# Patient Record
Sex: MALE | Race: ASIAN | Employment: OTHER | URBAN - METROPOLITAN AREA
[De-identification: names, ages, dates, MRNs, and addresses within clinical notes are randomized per-mention and may not be internally consistent; named-entity substitution may affect disease eponyms.]

---

## 2017-02-13 ENCOUNTER — TELEPHONE (OUTPATIENT)
Dept: ENDOCRINOLOGY | Age: 52
End: 2017-02-13

## 2017-02-13 ENCOUNTER — OFFICE VISIT (OUTPATIENT)
Dept: FAMILY MEDICINE CLINIC | Age: 52
End: 2017-02-13

## 2017-02-13 VITALS
TEMPERATURE: 97.5 F | HEIGHT: 64 IN | DIASTOLIC BLOOD PRESSURE: 71 MMHG | HEART RATE: 82 BPM | OXYGEN SATURATION: 98 % | BODY MASS INDEX: 29.19 KG/M2 | SYSTOLIC BLOOD PRESSURE: 112 MMHG | RESPIRATION RATE: 18 BRPM | WEIGHT: 171 LBS

## 2017-02-13 DIAGNOSIS — J45.40 MODERATE PERSISTENT ASTHMA WITHOUT COMPLICATION: ICD-10-CM

## 2017-02-13 DIAGNOSIS — T21.26XA: ICD-10-CM

## 2017-02-13 DIAGNOSIS — E11.65 TYPE 2 DIABETES MELLITUS WITH HYPERGLYCEMIA, WITHOUT LONG-TERM CURRENT USE OF INSULIN (HCC): Primary | ICD-10-CM

## 2017-02-13 DIAGNOSIS — J40 BRONCHITIS: Primary | ICD-10-CM

## 2017-02-13 RX ORDER — DOXYCYCLINE 100 MG/1
100 TABLET ORAL 2 TIMES DAILY
Qty: 20 TAB | Refills: 0 | Status: SHIPPED | OUTPATIENT
Start: 2017-02-13 | End: 2017-02-23

## 2017-02-13 RX ORDER — FLUTICASONE PROPIONATE 50 MCG
SPRAY, SUSPENSION (ML) NASAL
Qty: 1 BOTTLE | Refills: 11 | Status: SHIPPED | OUTPATIENT
Start: 2017-02-13 | End: 2017-05-09 | Stop reason: SDUPTHER

## 2017-02-13 RX ORDER — BACITRACIN ZINC 500 UNIT/G
OINTMENT (GRAM) TOPICAL 2 TIMES DAILY
Qty: 28 G | Refills: 0 | Status: SHIPPED | OUTPATIENT
Start: 2017-02-13 | End: 2017-05-09

## 2017-02-13 RX ORDER — MUPIROCIN 20 MG/G
OINTMENT TOPICAL 2 TIMES DAILY
Qty: 22 G | Refills: 0 | Status: SHIPPED | OUTPATIENT
Start: 2017-02-13 | End: 2017-02-20

## 2017-02-13 RX ORDER — LEVOFLOXACIN 500 MG/1
TABLET, FILM COATED ORAL
COMMUNITY
Start: 2017-02-06 | End: 2017-05-09

## 2017-02-13 RX ORDER — ALBUTEROL SULFATE 90 UG/1
AEROSOL, METERED RESPIRATORY (INHALATION)
Qty: 1 INHALER | Refills: 2 | Status: SHIPPED | OUTPATIENT
Start: 2017-02-13 | End: 2017-05-09 | Stop reason: SDUPTHER

## 2017-02-13 RX ORDER — CODEINE PHOSPHATE AND GUAIFENESIN 10; 100 MG/5ML; MG/5ML
SOLUTION ORAL
COMMUNITY
Start: 2017-02-06 | End: 2017-05-09

## 2017-02-13 NOTE — LETTER
2/28/2017 3:46 PM 
 
Mr. Mague Gonzalez 
[address] [facility] [place] [id number] Dear Mague Gonzalez: 
 
Please find your most recent results below. Resulted Orders METABOLIC PANEL, COMPREHENSIVE Result Value Ref Range Glucose 106 (H) 65 - 99 mg/dL BUN 15 6 - 24 mg/dL Creatinine 1.16 0.76 - 1.27 mg/dL GFR est non-AA 73 >59 mL/min/1.73 GFR est AA 84 >59 mL/min/1.73  
 BUN/Creatinine ratio 13 9 - 20 Sodium 139 134 - 144 mmol/L Potassium 4.8 3.5 - 5.2 mmol/L Chloride 102 96 - 106 mmol/L  
 CO2 21 18 - 29 mmol/L Calcium 9.4 8.7 - 10.2 mg/dL Protein, total 6.6 6.0 - 8.5 g/dL Albumin 4.0 3.5 - 5.5 g/dL GLOBULIN, TOTAL 2.6 1.5 - 4.5 g/dL A-G Ratio 1.5 1.1 - 2.5 Comment: **Effective March 13, 2017 the reference interval** 
  for A/G Ratio will be changing to: Age                Male          Female 0 -  7 days       1.1 - 2.3       1.1 - 2.3 
          8 - 30 days       1.2 - 2.8       1.2 - 2.8 
          1 -  6 months     1.3 - 3.6       1.3 - 3.6 
   7 months -  5 years      1.5 - 2.6       1.5 - 2.6 
             > 5 years      1.2 - 2.2       1.2 - 2.2 Bilirubin, total 1.1 0.0 - 1.2 mg/dL Alk. phosphatase 73 39 - 117 IU/L  
 AST (SGOT) 24 0 - 40 IU/L  
 ALT (SGPT) 17 0 - 44 IU/L Narrative Performed at:  [address]  [id number] : Td Blackwell MD, Phone:  [phone] LIPID PANEL Result Value Ref Range Cholesterol, total 221 (H) 100 - 199 mg/dL Triglyceride 60 0 - 149 mg/dL HDL Cholesterol 38 (L) >39 mg/dL VLDL, calculated 12 5 - 40 mg/dL LDL, calculated 171 (H) 0 - 99 mg/dL Narrative Performed at:  [address]  [id number] : Td Blackwell MD, Phone:  [phone] MICROALBUMIN, UR, RAND Result Value Ref Range Creatinine, urine 183.4 Not Estab. mg/dL Microalbumin, urine 3.9 Not Estab. ug/mL Microalb/Creat ratio (ug/mg creat.) 2.1 0.0 - 30.0 mg/g creat Narrative Performed at:  [address]  [id number] : Sis Carnes MD, Phone:  [phone] HEMOGLOBIN A1C Result Value Ref Range Hemoglobin A1c 6.0 (H) 4.8 - 5.6 % Comment:  
            Pre-diabetes: 5.7 - 6.4 Diabetes: >6.4 Glycemic control for adults with diabetes: <7.0 Estimated average glucose 126 mg/dL Narrative Performed at:  [address]  [id number] : Sis Carnes MD, Phone:  [phone] CVD REPORT Result Value Ref Range INTERPRETATION Note Comment:  
   Supplement report is available. Narrative Performed at:  [facility] [place] [address]  [id number] : Yunier Patterson PhD, Phone:  [phone] DIABETES PATIENT EDUCATION Result Value Ref Range PDF Image Not applicable Narrative Performed at:  [address] [place] [address]  [id number] : Yunier Patterson PhD, Phone:  [phone] RECOMMENDATIONS: 
Cholesterol is high. Diabetes is controlled - he needs cholesterol medication - lipitor 20 mg at night. Please call me if you have any questions: [phone] Sincerely, Phill Coppola MD
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
## 2017-02-13 NOTE — PATIENT INSTRUCTIONS
Bronchitis: Care Instructions  Your Care Instructions    Bronchitis is inflammation of the bronchial tubes, which carry air to the lungs. The tubes swell and produce mucus, or phlegm. The mucus and inflamed bronchial tubes make you cough. You may have trouble breathing. Most cases of bronchitis are caused by viruses like those that cause colds. Antibiotics usually do not help and they may be harmful. Bronchitis usually develops rapidly and lasts about 2 to 3 weeks in otherwise healthy people. Follow-up care is a key part of your treatment and safety. Be sure to make and go to all appointments, and call your doctor if you are having problems. It's also a good idea to know your test results and keep a list of the medicines you take. How can you care for yourself at home? · Take all medicines exactly as prescribed. Call your doctor if you think you are having a problem with your medicine. · Get some extra rest.  · Take an over-the-counter pain medicine, such as acetaminophen (Tylenol), ibuprofen (Advil, Motrin), or naproxen (Aleve) to reduce fever and relieve body aches. Read and follow all instructions on the label. · Do not take two or more pain medicines at the same time unless the doctor told you to. Many pain medicines have acetaminophen, which is Tylenol. Too much acetaminophen (Tylenol) can be harmful. · Take an over-the-counter cough medicine that contains dextromethorphan to help quiet a dry, hacking cough so that you can sleep. Avoid cough medicines that have more than one active ingredient. Read and follow all instructions on the label. · Breathe moist air from a humidifier, hot shower, or sink filled with hot water. The heat and moisture will thin mucus so you can cough it out. · Do not smoke. Smoking can make bronchitis worse. If you need help quitting, talk to your doctor about stop-smoking programs and medicines. These can increase your chances of quitting for good.   When should you call for help? Call 911 anytime you think you may need emergency care. For example, call if:  · You have severe trouble breathing. Call your doctor now or seek immediate medical care if:  · You have new or worse trouble breathing. · You cough up dark brown or bloody mucus (sputum). · You have a new or higher fever. · You have a new rash. Watch closely for changes in your health, and be sure to contact your doctor if:  · You cough more deeply or more often, especially if you notice more mucus or a change in the color of your mucus. · You are not getting better as expected. Where can you learn more? Go to http://maria elena-karol.info/. Enter H333 in the search box to learn more about \"Bronchitis: Care Instructions. \"  Current as of: May 23, 2016  Content Version: 11.1  © 4339-2324 [employer]. Care instructions adapted under license by [facility] (which disclaims liability or warranty for this information). If you have questions about a medical condition or this instruction, always ask your healthcare professional. [place] [address] any warranty or liability for your use of this information. Fuentes: Care Instructions  Your Care Instructions    Fuentes--even minor ones--can be very painful. A minor burn may heal within several days, while a more serious burn may take weeks or even months to heal completely. You may notice that the burned area feels tight and hard while it is healing. It is important to continue to move the area as the burn heals to prevent loss of motion or loss of function in the area. When your skin is damaged by a burn, you have a greater risk of infection. Keep the wound clean and change the bandages regularly to prevent infection and help the burn heal.  Burns can leave permanent scars. Taking good care of the burn as it heals may help prevent bad scars. The doctor has checked you carefully, but problems can develop later.  If you notice any problems or new symptoms, get medical treatment right away. Follow-up care is a key part of your treatment and safety. Be sure to make and go to all appointments, and call your doctor if you are having problems. It's also a good idea to know your test results and keep a list of the medicines you take. How can you care for yourself at home? · If your doctor told you how to care for your burn, follow your doctor's instructions. If you did not get instructions, follow this general advice:  ¨ Wash the burn with clean water 2 times a day. Don't use hydrogen peroxide or alcohol, which can slow healing. ¨ Gently pat the burn dry after you wash it. ¨ You may cover the burn with a thin layer of petroleum jelly, such as Vaseline, and a nonstick bandage. ¨ Apply more petroleum jelly and replace the bandage as needed. · Protect your burn while it is healing. Cover your burn if you are going out in the cold or the sun. ¨ Wear long sleeves if the burn is on your hands or arms. ¨ Wear a hat if the burn is on your face. ¨ Wear socks and shoes if the burn is on your feet. · Do not break blisters open. This increases the chance of infection. If a blister breaks open by itself, blot up the liquid, and leave the skin that covered the blister. This helps protect the new skin. · If your doctor prescribed antibiotics, take them as directed. Do not stop taking them just because you feel better. You need to take the full course of antibiotics. For pain and itching  · Take pain medicines exactly as directed. ¨ If the doctor gave you a prescription medicine for pain, take it as prescribed. ¨ If you are not taking a prescription pain medicine, ask your doctor if you can take an over-the-counter medicine. · If the burn itches, try not to scratch it. Try an over-the-counter antihistamine such as diphenhydramine (Benadryl) or loratadine (Claritin). Read and follow all instructions on the label.   When should you call for help?  Call your doctor now or seek immediate medical care if:  · Your pain gets worse. · You have symptoms of infection, such as:  ¨ Increased pain, swelling, warmth, or redness near the burn. ¨ Red streaks leading from the burn. ¨ Pus draining from the burn. ¨ A fever. Watch closely for changes in your health, and be sure to contact your doctor if:  · You do not get better as expected. Where can you learn more? Go to http://maria elena-karol.info/. Enter Z631 in the search box to learn more about \"Burns: Care Instructions. \"  Current as of: May 27, 2016  Content Version: 11.1  © 4068-3138 [employer]. Care instructions adapted under license by [employer] (which disclaims liability or warranty for this information). If you have questions about a medical condition or this instruction, always ask your healthcare professional. [address] any warranty or liability for your use of this information.

## 2017-02-13 NOTE — PROGRESS NOTES
Chief Complaint   Patient presents with    Cold Symptoms     sinus congestion,, cough and stuffed/runny nose for 2 wks. went to [facility] rx for cough med and antibiotic but no relief. . has hx of asthma. 1. Have you been to the ER, urgent care clinic since your last visit? Hospitalized since your last visit? Yes When: wk ago Where: [facility] Reason for visit: cold sx    2. Have you seen or consulted any other health care providers outside of the [address] [place] [address] since your last visit? Include any pap smears or colon screening.  No

## 2017-02-13 NOTE — TELEPHONE ENCOUNTER
Patient getting labs drawn Friday 2/17/17 at [facility].  Please fax orders to [phone].

## 2017-02-15 ENCOUNTER — TELEPHONE (OUTPATIENT)
Dept: FAMILY MEDICINE CLINIC | Age: 52
End: 2017-02-15

## 2017-02-15 NOTE — TELEPHONE ENCOUNTER
I called and left VM last night  Today, returned call to patient - we discussed need for culture of his penile lesion and also some health maintenance items (including colonoscopy) - I recommended he make an appt for CPE with Dr. Lyndsay Prater (he prefers male provider). [facility] to help to make that appt for this week.

## 2017-02-16 NOTE — PROGRESS NOTES
I reviewed with the resident the medical history and the resident's findings on the physical examination. I discussed with the resident the patient's diagnosis and concur with the plan.   To be seen for HSV culture

## 2017-02-18 LAB
ALBUMIN SERPL-MCNC: 4 G/DL (ref 3.5–5.5)
ALBUMIN/CREAT UR: 2.1 MG/G CREAT (ref 0–30)
ALBUMIN/GLOB SERPL: 1.5 {RATIO} (ref 1.1–2.5)
ALP SERPL-CCNC: 73 IU/L (ref 39–117)
ALT SERPL-CCNC: 17 IU/L (ref 0–44)
AST SERPL-CCNC: 24 IU/L (ref 0–40)
BILIRUB SERPL-MCNC: 1.1 MG/DL (ref 0–1.2)
BUN SERPL-MCNC: 15 MG/DL (ref 6–24)
BUN/CREAT SERPL: 13 (ref 9–20)
CALCIUM SERPL-MCNC: 9.4 MG/DL (ref 8.7–10.2)
CHLORIDE SERPL-SCNC: 102 MMOL/L (ref 96–106)
CHOLEST SERPL-MCNC: 221 MG/DL (ref 100–199)
CO2 SERPL-SCNC: 21 MMOL/L (ref 18–29)
CREAT SERPL-MCNC: 1.16 MG/DL (ref 0.76–1.27)
CREAT UR-MCNC: 183.4 MG/DL
EST. AVERAGE GLUCOSE BLD GHB EST-MCNC: 126 MG/DL
GLOBULIN SER CALC-MCNC: 2.6 G/DL (ref 1.5–4.5)
GLUCOSE SERPL-MCNC: 106 MG/DL (ref 65–99)
HBA1C MFR BLD: 6 % (ref 4.8–5.6)
HDLC SERPL-MCNC: 38 MG/DL
INTERPRETATION, 910389: NORMAL
LDLC SERPL CALC-MCNC: 171 MG/DL (ref 0–99)
Lab: NORMAL
MICROALBUMIN UR-MCNC: 3.9 UG/ML
POTASSIUM SERPL-SCNC: 4.8 MMOL/L (ref 3.5–5.2)
PROT SERPL-MCNC: 6.6 G/DL (ref 6–8.5)
SODIUM SERPL-SCNC: 139 MMOL/L (ref 134–144)
TRIGL SERPL-MCNC: 60 MG/DL (ref 0–149)
VLDLC SERPL CALC-MCNC: 12 MG/DL (ref 5–40)

## 2017-02-23 NOTE — TELEPHONE ENCOUNTER
Cholesterol is high '    Diabetes is controlled - he needs cholesterol medication - lipitor 20 mg at night     Need FU appointment if he wants to discuss more

## 2017-02-24 DIAGNOSIS — E78.5 HYPERLIPIDEMIA, UNSPECIFIED HYPERLIPIDEMIA TYPE: Primary | ICD-10-CM

## 2017-02-27 NOTE — TELEPHONE ENCOUNTER
Pt called Pcp's office to get results of labs ordered by Dr. Elaine Desouza and was advised to contact Dr. Colt Quintero office, which pt states he did about a week ago with no response. Pt advised message noted from 2/24/17, but says he can't get through to office today. Pt requesting call back with lab results please.

## 2017-02-28 RX ORDER — ATORVASTATIN CALCIUM 20 MG/1
20 TABLET, FILM COATED ORAL DAILY
Qty: 30 TAB | Refills: 11 | Status: SHIPPED | OUTPATIENT
Start: 2017-02-28 | End: 2017-08-19 | Stop reason: SDUPTHER

## 2017-02-28 NOTE — TELEPHONE ENCOUNTER
Per Dr. Nita Fletcher, informed pt of the following \"Cholesterol is high. Diabetes is controlled - he needs cholesterol medication - lipitor 20 mg at night. Need FU appointment if he wants to discuss more\" Pt verbalized understanding and would like a copy mailed. No further questions or concerns at this time. Will mail copy.

## 2017-02-28 NOTE — TELEPHONE ENCOUNTER
----- Message from Gloria Edwards MD sent at 2/23/2017  4:49 PM EST -----  Cholesterol is high '    Diabetes is controlled - he needs cholesterol medication - lipitor 20 mg at night     Need FU appointment if he wants to discuss more

## 2017-05-09 ENCOUNTER — OFFICE VISIT (OUTPATIENT)
Dept: FAMILY MEDICINE CLINIC | Age: 52
End: 2017-05-09

## 2017-05-09 VITALS
WEIGHT: 172.6 LBS | DIASTOLIC BLOOD PRESSURE: 83 MMHG | BODY MASS INDEX: 29.47 KG/M2 | RESPIRATION RATE: 20 BRPM | SYSTOLIC BLOOD PRESSURE: 127 MMHG | HEIGHT: 64 IN | HEART RATE: 79 BPM | TEMPERATURE: 97.7 F

## 2017-05-09 DIAGNOSIS — J30.9 ALLERGIC RHINITIS, UNSPECIFIED ALLERGIC RHINITIS TRIGGER, UNSPECIFIED RHINITIS SEASONALITY: ICD-10-CM

## 2017-05-09 DIAGNOSIS — E78.00 HYPERCHOLESTEROLEMIA: ICD-10-CM

## 2017-05-09 DIAGNOSIS — J45.40 MODERATE PERSISTENT ASTHMA WITHOUT COMPLICATION: ICD-10-CM

## 2017-05-09 DIAGNOSIS — E11.65 TYPE 2 DIABETES MELLITUS WITH HYPERGLYCEMIA, WITHOUT LONG-TERM CURRENT USE OF INSULIN (HCC): Primary | ICD-10-CM

## 2017-05-09 DIAGNOSIS — Z78.9 DISCOMFORT: ICD-10-CM

## 2017-05-09 RX ORDER — TRAMADOL HYDROCHLORIDE 50 MG/1
TABLET ORAL AS NEEDED
Refills: 0 | COMMUNITY
Start: 2017-04-14 | End: 2017-10-29

## 2017-05-09 RX ORDER — FLUTICASONE PROPIONATE 50 MCG
SPRAY, SUSPENSION (ML) NASAL
Qty: 1 BOTTLE | Refills: 11 | Status: SHIPPED | OUTPATIENT
Start: 2017-05-09 | End: 2017-11-03 | Stop reason: SDUPTHER

## 2017-05-09 RX ORDER — MELOXICAM 15 MG/1
TABLET ORAL DAILY
Refills: 0 | COMMUNITY
Start: 2017-04-14 | End: 2017-05-09 | Stop reason: ALTCHOICE

## 2017-05-09 RX ORDER — IBUPROFEN 800 MG/1
800 TABLET ORAL
Qty: 30 TAB | Refills: 1 | Status: SHIPPED | OUTPATIENT
Start: 2017-05-09 | End: 2017-11-03

## 2017-05-09 RX ORDER — IBUPROFEN 800 MG/1
800 TABLET ORAL
Qty: 30 TAB | Refills: 1 | Status: CANCELLED | OUTPATIENT
Start: 2017-05-09

## 2017-05-09 RX ORDER — ALBUTEROL SULFATE 90 UG/1
AEROSOL, METERED RESPIRATORY (INHALATION)
Qty: 1 INHALER | Refills: 2 | Status: SHIPPED | OUTPATIENT
Start: 2017-05-09 | End: 2018-03-28 | Stop reason: SDUPTHER

## 2017-05-09 RX ORDER — ALBUTEROL SULFATE 0.83 MG/ML
2.5 SOLUTION RESPIRATORY (INHALATION)
Qty: 24 EACH | Refills: 2 | Status: SHIPPED | OUTPATIENT
Start: 2017-05-09 | End: 2018-10-02 | Stop reason: SDUPTHER

## 2017-05-09 NOTE — MR AVS SNAPSHOT
Visit Information Date & Time Provider Department Dept. Phone Encounter #  
 5/9/2017  3:30 PM Maddie Ko, [address] [id number] Follow-up Instructions Return if symptoms worsen or fail to improve. Upcoming Health Maintenance Date Due  
 FOOT EXAM Q1 10/29/1976 EYE EXAM RETINAL OR DILATED Q1 10/29/1976 Pneumococcal 19-64 Medium Risk (1 of 1 - PPSV23) 10/29/1985 DTaP/Tdap/Td series (1 - Tdap) 10/29/1987 FOBT Q 1 YEAR AGE 50-75 10/29/2016 INFLUENZA AGE 9 TO ADULT 8/1/2017 HEMOGLOBIN A1C Q6M 8/17/2017 MICROALBUMIN Q1 2/17/2018 LIPID PANEL Q1 2/17/2018 Allergies as of 5/9/2017  Review Complete On: 5/9/2017 By: Maddie Ko MD  
  
 Severity Noted Reaction Type Reactions Pcn [Penicillins]  10/11/2011    Other (comments) Father told him as child he was allergic to PCN. He does not know if he has every been on a cephalosporin that he has tolerated Current Immunizations  Reviewed on 9/26/2012 Name Date PPD 9/24/2012 Not reviewed this visit You Were Diagnosed With   
  
 Codes Comments Type 2 diabetes mellitus with hyperglycemia, without long-term current use of insulin ([facility])    -  Primary ICD-10-CM: E11.65 ICD-9-CM: 250.00, 790.29 Hypercholesterolemia     ICD-10-CM: E78.00 ICD-9-CM: 272.0 Moderate persistent asthma without complication     SLJ-69-YL: J45.40 ICD-9-CM: 493.90 Discomfort     ICD-10-CM: Z78.9 ICD-9-CM: V49.89 Allergic rhinitis, unspecified allergic rhinitis trigger, unspecified rhinitis seasonality     ICD-10-CM: J30.9 ICD-9-CM: 477.9 Vitals BP Pulse Temp Resp Height(growth percentile) Weight(growth percentile) 127/83 (BP 1 Location: Left arm, BP Patient Position: Sitting) 79 97.7 °F (36.5 °C) (Oral) 20 5' 4\" (1.626 m) 172 lb 9.6 oz (78.3 kg) BMI Smoking Status 29.63 kg/m2 Never Smoker Vitals History BMI and BSA Data Body Mass Index Body Surface Area  
 29.63 kg/m 2 1.88 m 2 Preferred Pharmacy Pharmacy Name Phone [facility] [address] [place] [facility] [phone] Your Updated Medication List  
  
   
This list is accurate as of: 5/9/17  4:51 PM.  Always use your most recent med list.  
  
  
  
  
 * albuterol 90 mcg/actuation inhaler Commonly known as:  VENTOLIN HFA INHALE 1 TO 2 PUFFS BY MOUTH EVERY 4 HOURS AS NEEDED FOR WHEEZING  
  
 * albuterol 2.5 mg /3 mL (0.083 %) nebulizer solution Commonly known as:  PROVENTIL VENTOLIN  
3 mL by Nebulization route every four (4) hours as needed for Wheezing. atorvastatin 20 mg tablet Commonly known as:  LIPITOR Take 1 Tab by mouth daily. beclomethasone 80 mcg/actuation [employer] Commonly known as:  QVAR Take 1 Puff by inhalation two (2) times a day. fluticasone 50 mcg/actuation nasal spray Commonly known as:  FLONASE  
USE 2 SPRAYS IN BOTH NOSTRILS EVERY DAY  
  
 ibuprofen 800 mg tablet Commonly known as:  MOTRIN Take 1 Tab by mouth every eight (8) hours as needed for Pain.  
  
 metFORMIN  mg tablet Commonly known as:  GLUCOPHAGE XR Take 1 Tab by mouth two (2) times a day. For DM2  
  
 traMADol 50 mg tablet Commonly known as:  ULTRAM  
as needed. * Notice: This list has 2 medication(s) that are the same as other medications prescribed for you. Read the directions carefully, and ask your doctor or other care provider to review them with you. Prescriptions Sent to Pharmacy Refills  
 fluticasone (FLONASE) 50 mcg/actuation nasal spray 11 Sig: USE 2 SPRAYS IN BOTH NOSTRILS EVERY DAY  Class: Normal  
 Pharmacy: [address] [place] [address] Ph #: [phone]  
 albuterol (VENTOLIN HFA) 90 mcg/actuation inhaler 2  
 Sig: INHALE 1 TO 2 PUFFS BY MOUTH EVERY 4 HOURS AS NEEDED FOR WHEEZING Class: Normal  
 Pharmacy: [facility] [address] Ph #: [phone]  
 albuterol (PROVENTIL VENTOLIN) 2.5 mg /3 mL (0.083 %) nebulizer solution 2 Sig: 3 mL by Nebulization route every four (4) hours as needed for Wheezing. Class: Normal  
 Pharmacy: [address] Ph #: [phone] Route: Nebulization  
 beclomethasone (QVAR) 80 mcg/actuation aero 11 Sig: Take 1 Puff by inhalation two (2) times a day. Class: Normal  
 Pharmacy: [address] Ph #: [phone] Route: Inhalation  
 ibuprofen (MOTRIN) 800 mg tablet 1 Sig: Take 1 Tab by mouth every eight (8) hours as needed for Pain. Class: Normal  
 Pharmacy: [address] Ph #: [phone] Route: Oral  
  
Follow-up Instructions Return if symptoms worsen or fail to improve. To-Do List   
 07/10/2017 Lab:  HEPATIC FUNCTION PANEL   
  
 07/10/2017 Lab:  LIPID PANEL Introducing [address]! Dirk Steinberg introduces [employer] patient portal. Now you can access parts of your medical record, email your doctor's office, and request medication refills online. 1. In your internet browser, go to [email or web address]"t 2. Click on the First Time User? Click Here link in the Sign In box. You will see the New Member Sign Up page. 3. Enter your [employer] Access Code exactly as it appears below. You will not need to use this code after youve completed the sign-up process. If you do not sign up before the expiration date, you must request a new code. · [employer] Access Code: [email or web address] Expires: 5/14/2017  5:52 PM 
 
4. Enter the last four digits of your Social Security Number (xxxx) and Date of Birth (mm/dd/yyyy) as indicated and click Submit. You will be taken to the next sign-up page. 5. Create a [employer] ID. This will be your [employer] login ID and cannot be changed, so think of one that is secure and easy to remember. 6. Create a [employer] password. You can change your password at any time. 7. Enter your Password Reset Question and Answer. This can be used at a later time if you forget your password. 8. Enter your e-mail address. You will receive e-mail notification when new information is available in [address] 9. Click Sign Up. You can now view and download portions of your medical record. 10. Click the Download Summary menu link to download a portable copy of your medical information. If you have questions, please visit the Frequently Asked Questions section of the [employer] website. Remember, [employer] is NOT to be used for urgent needs. For medical emergencies, dial 911. Now available from your iPhone and Android! Please provide this summary of care documentation to your next provider. Your primary care clinician is listed as Kimmie Hart. If you have any questions after today's visit, please call [phone].

## 2017-05-09 NOTE — PROGRESS NOTES
Chief Complaint   Patient presents with    Diabetes     follow up    Cholesterol Problem     elevated lipids follow up

## 2017-05-09 NOTE — PROGRESS NOTES
HISTORY OF PRESENT ILLNESS  Sami Tsang is a 49 y.o. male. Diabetes   The history is provided by the patient (he sees endocrinology for this). This is a chronic problem. The current episode started more than 1 week ago. The problem occurs constantly. The problem has been gradually improving. Pertinent negatives include no chest pain, no abdominal pain, no headaches and no shortness of breath. Nothing aggravates the symptoms. The symptoms are relieved by medications. Treatments tried: metformin. The treatment provided significant relief. Cholesterol Problem   The history is provided by the patient (he just started Lipitor ). Pertinent negatives include no chest pain, no abdominal pain, no headaches and no shortness of breath. Review of Systems   Constitutional: Positive for malaise/fatigue and weight loss. Eyes: Negative for blurred vision. Respiratory: Negative for shortness of breath. Cardiovascular: Negative for chest pain and leg swelling. Gastrointestinal: Negative for abdominal pain. Genitourinary: Negative for frequency. Neurological: Negative for dizziness, sensory change, speech change, focal weakness and headaches. Endo/Heme/Allergies: Negative for polydipsia. Lab Results   Component Value Date/Time    Hemoglobin A1c 6.0 02/17/2017 08:42 AM    Hemoglobin A1c, External 7.4 09/23/2016         Visit Vitals    /83 (BP 1 Location: Left arm, BP Patient Position: Sitting)    Pulse 79    Temp 97.7 °F (36.5 °C) (Oral)    Resp 20    Ht 5' 4\" (1.626 m)    Wt 172 lb 9.6 oz (78.3 kg)    BMI 29.63 kg/m2     Physical Exam   Constitutional: He is oriented to person, place, and time. He appears well-developed and well-nourished. No distress. Cardiovascular: Normal rate, regular rhythm and normal heart sounds. Exam reveals no gallop and no friction rub. No murmur heard. Pulmonary/Chest: Effort normal and breath sounds normal. No respiratory distress. He has no wheezes.  He has no rales. Musculoskeletal: He exhibits no edema. Neurological: He is alert and oriented to person, place, and time. Skin: Skin is warm and dry. He is not diaphoretic. Nursing note and vitals reviewed. ASSESSMENT and PLAN    ICD-10-CM ICD-9-CM    1. Type 2 diabetes mellitus with hyperglycemia, without long-term current use of insulin (HCC) E11.65 250.00      790.29    2. Hypercholesterolemia E78.00 272.0 HEPATIC FUNCTION PANEL      LIPID PANEL   3. Moderate persistent asthma without complication F20.80 793.93 albuterol (VENTOLIN HFA) 90 mcg/actuation inhaler      albuterol (PROVENTIL VENTOLIN) 2.5 mg /3 mL (0.083 %) nebulizer solution      beclomethasone (QVAR) 80 mcg/actuation aero   4. Discomfort Z78.9 V49.89 ibuprofen (MOTRIN) 800 mg tablet   5. Allergic rhinitis, unspecified allergic rhinitis trigger, unspecified rhinitis seasonality J30.9 477.9 fluticasone (FLONASE) 50 mcg/actuation nasal spray        Will be seeing Endocrinology for his diabetes next week  Just started Lipitor  Labs per orders. Refills per orders    Follow-up Disposition:  Return if symptoms worsen or fail to improve. Reviewed plan of care. Patient has provided input and agrees with goals.

## 2017-05-23 ENCOUNTER — TELEPHONE (OUTPATIENT)
Dept: FAMILY MEDICINE CLINIC | Age: 52
End: 2017-05-23

## 2017-05-23 RX ORDER — BENZONATATE 200 MG/1
200 CAPSULE ORAL
Qty: 30 CAP | Refills: 0 | Status: SHIPPED | OUTPATIENT
Start: 2017-05-23 | End: 2017-10-29

## 2017-05-23 NOTE — TELEPHONE ENCOUNTER
Pt called back requesting refill on cough syrup prescribed last for cough stating he forgot to ask for it at his last appointment with . Pt states he's using his nebulizer but needs something for cough and wants call back to advise.

## 2017-05-23 NOTE — TELEPHONE ENCOUNTER
Called and spoke with pt, and he has been advised a RX has been sent to his pharmacy. Pt advised if he does not get better then he needs to call and schedule an appointment. Pt states understanding.

## 2017-05-26 ENCOUNTER — HOSPITAL ENCOUNTER (OUTPATIENT)
Dept: MRI IMAGING | Age: 52
Discharge: HOME | End: 2017-05-26
Attending: PHYSICAL MEDICINE & REHABILITATION
Payer: COMMERCIAL

## 2017-05-26 DIAGNOSIS — M54.42 LUMBAGO WITH SCIATICA, LEFT SIDE: ICD-10-CM

## 2017-05-26 DIAGNOSIS — M54.41 LUMBAGO WITH SCIATICA, RIGHT SIDE: ICD-10-CM

## 2017-05-26 DIAGNOSIS — M47.816 LUMBAR SPONDYLOSIS: ICD-10-CM

## 2017-05-26 PROCEDURE — 72148 MRI LUMBAR SPINE W/O DYE: CPT

## 2017-07-10 DIAGNOSIS — E78.00 HYPERCHOLESTEROLEMIA: ICD-10-CM

## 2017-08-15 LAB
ALBUMIN SERPL-MCNC: 4 G/DL (ref 3.5–5.5)
ALP SERPL-CCNC: 70 IU/L (ref 39–117)
ALT SERPL-CCNC: 21 IU/L (ref 0–44)
AST SERPL-CCNC: 18 IU/L (ref 0–40)
BILIRUB DIRECT SERPL-MCNC: 0.27 MG/DL (ref 0–0.4)
BILIRUB SERPL-MCNC: 1.2 MG/DL (ref 0–1.2)
CHOLEST SERPL-MCNC: 189 MG/DL (ref 100–199)
HDLC SERPL-MCNC: 41 MG/DL
INTERPRETATION, 910389: NORMAL
LDLC SERPL CALC-MCNC: 128 MG/DL (ref 0–99)
PROT SERPL-MCNC: 7.2 G/DL (ref 6–8.5)
TRIGL SERPL-MCNC: 102 MG/DL (ref 0–149)
VLDLC SERPL CALC-MCNC: 20 MG/DL (ref 5–40)

## 2017-08-19 DIAGNOSIS — E78.5 HYPERLIPIDEMIA, UNSPECIFIED HYPERLIPIDEMIA TYPE: ICD-10-CM

## 2017-08-19 RX ORDER — ATORVASTATIN CALCIUM 40 MG/1
40 TABLET, FILM COATED ORAL DAILY
Qty: 30 TAB | Refills: 3 | Status: SHIPPED | OUTPATIENT
Start: 2017-08-19 | End: 2017-08-22 | Stop reason: SDUPTHER

## 2017-08-21 ENCOUNTER — OFFICE VISIT (OUTPATIENT)
Dept: ENDOCRINOLOGY | Age: 52
End: 2017-08-21

## 2017-08-21 VITALS
OXYGEN SATURATION: 98 % | RESPIRATION RATE: 18 BRPM | HEART RATE: 87 BPM | TEMPERATURE: 96.1 F | SYSTOLIC BLOOD PRESSURE: 133 MMHG | DIASTOLIC BLOOD PRESSURE: 95 MMHG | WEIGHT: 182.2 LBS | HEIGHT: 64 IN | BODY MASS INDEX: 31.1 KG/M2

## 2017-08-21 DIAGNOSIS — E78.00 HYPERCHOLESTEROLEMIA: ICD-10-CM

## 2017-08-21 DIAGNOSIS — E11.65 TYPE 2 DIABETES MELLITUS WITH HYPERGLYCEMIA, WITHOUT LONG-TERM CURRENT USE OF INSULIN (HCC): Primary | ICD-10-CM

## 2017-08-21 LAB — HBA1C MFR BLD HPLC: 5.9 %

## 2017-08-21 RX ORDER — MELOXICAM 15 MG/1
TABLET ORAL
COMMUNITY
Start: 2017-04-14 | End: 2017-10-29

## 2017-08-21 RX ORDER — GABAPENTIN 300 MG/1
CAPSULE ORAL
Refills: 1 | COMMUNITY
Start: 2017-06-15 | End: 2017-10-29

## 2017-08-21 RX ORDER — MINERAL OIL, LIGHT/MINERAL OIL 1%-4.5%
DROPS OPHTHALMIC (EYE)
Refills: 5 | COMMUNITY
Start: 2017-07-11 | End: 2018-10-30

## 2017-08-21 NOTE — MR AVS SNAPSHOT
Visit Information Date & Time Provider Department Dept. Phone Encounter #  
 8/21/2017 11:30 AM Rio Pat MD [facility] [phone] [id number] Follow-up Instructions Return in about 6 months (around 2/21/2018). Upcoming Health Maintenance Date Due  
 FOOT EXAM Q1 10/29/1976 EYE EXAM RETINAL OR DILATED Q1 10/29/1976 Pneumococcal 19-64 Medium Risk (1 of 1 - PPSV23) 10/29/1985 DTaP/Tdap/Td series (1 - Tdap) 10/29/1987 FOBT Q 1 YEAR AGE 50-75 10/29/2016 INFLUENZA AGE 9 TO ADULT 8/1/2017 HEMOGLOBIN A1C Q6M 8/17/2017 MICROALBUMIN Q1 2/17/2018 LIPID PANEL Q1 8/14/2018 Allergies as of 8/21/2017  Review Complete On: 8/21/2017 By: Rio Pat MD  
  
 Severity Noted Reaction Type Reactions Pcn [Penicillins]  10/11/2011    Other (comments) Father told him as child he was allergic to PCN. He does not know if he has every been on a cephalosporin that he has tolerated Current Immunizations  Reviewed on 9/26/2012 Name Date PPD 9/24/2012 Not reviewed this visit Vitals BP Pulse Temp Resp Height(growth percentile) Weight(growth percentile) (!) 133/95 (BP 1 Location: Right arm, BP Patient Position: Sitting) 87 96.1 °F (35.6 °C) (Oral) 18 5' 4\" (1.626 m) 182 lb 3.2 oz (82.6 kg) SpO2 BMI Smoking Status 98% 31.27 kg/m2 Never Smoker BMI and BSA Data Body Mass Index Body Surface Area  
 31.27 kg/m 2 1.93 m 2 Preferred Pharmacy Pharmacy Name Phone [facility] [place], [address] [place] AT [facility] [phone] Your Updated Medication List  
  
   
This list is accurate as of: 8/21/17 12:28 PM.  Always use your most recent med list.  
  
  
  
  
 * albuterol 90 mcg/actuation inhaler Commonly known as:  VENTOLIN HFA INHALE 1 TO 2 PUFFS BY MOUTH EVERY 4 HOURS AS NEEDED FOR WHEEZING  
  
 * albuterol 2.5 mg /3 mL (0.083 %) nebulizer solution Commonly known as:  PROVENTIL VENTOLIN  
3 mL by Nebulization route every four (4) hours as needed for Wheezing. atorvastatin 40 mg tablet Commonly known as:  LIPITOR Take 1 Tab by mouth daily. beclomethasone 80 mcg/actuation [employer] Commonly known as:  QVAR Take 1 Puff by inhalation two (2) times a day. benzonatate 200 mg capsule Commonly known as:  TESSALON Take 1 Cap by mouth three (3) times daily as needed for Cough. fluticasone 50 mcg/actuation nasal spray Commonly known as:  FLONASE  
USE 2 SPRAYS IN BOTH NOSTRILS EVERY DAY  
  
 gabapentin 300 mg capsule Commonly known as:  NEURONTIN  
  
 ibuprofen 800 mg tablet Commonly known as:  MOTRIN Take 1 Tab by mouth every eight (8) hours as needed for Pain.  
  
 meloxicam 15 mg tablet Commonly known as:  MOBIC TK 1 T PO QD WF  
  
 metFORMIN  mg tablet Commonly known as:  GLUCOPHAGE XR Take 1 Tab by mouth two (2) times a day. For DM2 SOOTHE XP 1-4.5 % Drop ophthalmic solution Generic drug:  light mineral oil-mineral oil INT ONE GTT IN OU QID PRN  
  
 traMADol 50 mg tablet Commonly known as:  ULTRAM  
as needed. * Notice: This list has 2 medication(s) that are the same as other medications prescribed for you. Read the directions carefully, and ask your doctor or other care provider to review them with you. Follow-up Instructions Return in about 6 months (around 2/21/2018). Introducing [facility]! Waldemar Spicer introduces [employer] patient portal. Now you can access parts of your medical record, email your doctor's office, and request medication refills online. 1. In your internet browser, go to [email or web address] 2. Click on the First Time User? Click Here link in the Sign In box. You will see the New Member Sign Up page. 3. Enter your [employer] Access Code exactly as it appears below. You will not need to use this code after youve completed the sign-up process. If you do not sign up before the expiration date, you must request a new code. · [employer] Access Code: [email or web address] Expires: 11/19/2017 12:28 PM 
 
4. Enter the last four digits of your Social Security Number (xxxx) and Date of Birth (mm/dd/yyyy) as indicated and click Submit. You will be taken to the next sign-up page. 5. Create a [employer] ID. This will be your [employer] login ID and cannot be changed, so think of one that is secure and easy to remember. 6. Create a [employer] password. You can change your password at any time. 7. Enter your Password Reset Question and Answer. This can be used at a later time if you forget your password. 8. Enter your e-mail address. You will receive e-mail notification when new information is available in [address]. 9. Click Sign Up. You can now view and download portions of your medical record. 10. Click the Download Summary menu link to download a portable copy of your medical information. If you have questions, please visit the Frequently Asked Questions section of the [employer] website. Remember, [employer] is NOT to be used for urgent needs. For medical emergencies, dial 911. Now available from your iPhone and Android! Please provide this summary of care documentation to your next provider. Your primary care clinician is listed as Bryce Guerin. If you have any questions after today's visit, please call [phone].

## 2017-08-21 NOTE — PROGRESS NOTES
Wt Readings from Last 3 Encounters:   08/21/17 182 lb 3.2 oz (82.6 kg)   05/09/17 172 lb 9.6 oz (78.3 kg)   02/13/17 171 lb (77.6 kg)     Temp Readings from Last 3 Encounters:   08/21/17 96.1 °F (35.6 °C) (Oral)   05/09/17 97.7 °F (36.5 °C) (Oral)   02/13/17 97.5 °F (36.4 °C) (Oral)     BP Readings from Last 3 Encounters:   08/21/17 (!) 133/95   05/09/17 127/83   02/13/17 112/71     Pulse Readings from Last 3 Encounters:   08/21/17 87   05/09/17 79   02/13/17 82     Lab Results   Component Value Date/Time    Hemoglobin A1c 6.0 02/17/2017 08:42 AM    Hemoglobin A1c, External 7.4 09/23/2016   Foot exam: no  Eye exam: yes July 2017  1. Have you been to the ER, urgent care clinic since your last visit? Hospitalized since your last visit? No    2. Have you seen or consulted any other health care providers outside of the [facility] since your last visit? Include any pap smears or colon screening.  No

## 2017-08-22 DIAGNOSIS — E78.5 HYPERLIPIDEMIA, UNSPECIFIED HYPERLIPIDEMIA TYPE: ICD-10-CM

## 2017-08-22 DIAGNOSIS — E11.9 TYPE 2 DIABETES MELLITUS WITHOUT COMPLICATION, WITHOUT LONG-TERM CURRENT USE OF INSULIN (HCC): ICD-10-CM

## 2017-08-22 RX ORDER — METFORMIN HYDROCHLORIDE 500 MG/1
500 TABLET, EXTENDED RELEASE ORAL DAILY
Qty: 30 TAB | Refills: 11 | Status: SHIPPED | OUTPATIENT
Start: 2017-08-22 | End: 2018-02-06 | Stop reason: SDUPTHER

## 2017-08-22 RX ORDER — ATORVASTATIN CALCIUM 40 MG/1
40 TABLET, FILM COATED ORAL DAILY
Qty: 30 TAB | Refills: 11 | Status: SHIPPED | OUTPATIENT
Start: 2017-08-22 | End: 2018-03-28

## 2017-10-29 ENCOUNTER — HOSPITAL ENCOUNTER (EMERGENCY)
Age: 52
Discharge: HOME | End: 2017-10-29
Attending: EMERGENCY MEDICINE
Payer: COMMERCIAL

## 2017-10-29 ENCOUNTER — APPOINTMENT (OUTPATIENT)
Dept: GENERAL RADIOLOGY | Age: 52
End: 2017-10-29
Attending: PHYSICIAN ASSISTANT
Payer: COMMERCIAL

## 2017-10-29 VITALS
WEIGHT: 180 LBS | OXYGEN SATURATION: 99 % | BODY MASS INDEX: 30.73 KG/M2 | HEIGHT: 64 IN | RESPIRATION RATE: 18 BRPM | TEMPERATURE: 97.8 F | HEART RATE: 92 BPM | SYSTOLIC BLOOD PRESSURE: 127 MMHG | DIASTOLIC BLOOD PRESSURE: 96 MMHG

## 2017-10-29 DIAGNOSIS — J45.31 MILD PERSISTENT ASTHMA WITH ACUTE EXACERBATION: Primary | ICD-10-CM

## 2017-10-29 LAB
ALBUMIN SERPL-MCNC: 3.3 G/DL (ref 3.5–5)
ALBUMIN/GLOB SERPL: 0.9 {RATIO} (ref 1.1–2.2)
ALP SERPL-CCNC: 79 U/L (ref 45–117)
ALT SERPL-CCNC: 27 U/L (ref 12–78)
ANION GAP SERPL CALC-SCNC: 9 MMOL/L (ref 5–15)
AST SERPL-CCNC: 20 U/L (ref 15–37)
BASOPHILS # BLD: 0 K/UL (ref 0–0.1)
BASOPHILS NFR BLD: 0 % (ref 0–1)
BILIRUB SERPL-MCNC: 0.8 MG/DL (ref 0.2–1)
BUN SERPL-MCNC: 14 MG/DL (ref 6–20)
BUN/CREAT SERPL: 10 (ref 12–20)
CALCIUM SERPL-MCNC: 8.5 MG/DL (ref 8.5–10.1)
CHLORIDE SERPL-SCNC: 106 MMOL/L (ref 97–108)
CO2 SERPL-SCNC: 23 MMOL/L (ref 21–32)
CREAT SERPL-MCNC: 1.38 MG/DL (ref 0.7–1.3)
EOSINOPHIL # BLD: 0.6 K/UL (ref 0–0.4)
EOSINOPHIL NFR BLD: 10 % (ref 0–7)
ERYTHROCYTE [DISTWIDTH] IN BLOOD BY AUTOMATED COUNT: 14.1 % (ref 11.5–14.5)
GLOBULIN SER CALC-MCNC: 3.6 G/DL (ref 2–4)
GLUCOSE SERPL-MCNC: 159 MG/DL (ref 65–100)
HCT VFR BLD AUTO: 47.3 % (ref 36.6–50.3)
HGB BLD-MCNC: 16.4 G/DL (ref 12.1–17)
LYMPHOCYTES # BLD: 2.7 K/UL (ref 0.8–3.5)
LYMPHOCYTES NFR BLD: 45 % (ref 12–49)
MCH RBC QN AUTO: 27 PG (ref 26–34)
MCHC RBC AUTO-ENTMCNC: 34.7 G/DL (ref 30–36.5)
MCV RBC AUTO: 77.8 FL (ref 80–99)
MONOCYTES # BLD: 0.3 K/UL (ref 0–1)
MONOCYTES NFR BLD: 5 % (ref 5–13)
NEUTS SEG # BLD: 2.5 K/UL (ref 1.8–8)
NEUTS SEG NFR BLD: 40 % (ref 32–75)
PLATELET # BLD AUTO: 193 K/UL (ref 150–400)
POTASSIUM SERPL-SCNC: 3.4 MMOL/L (ref 3.5–5.1)
PROT SERPL-MCNC: 6.9 G/DL (ref 6.4–8.2)
RBC # BLD AUTO: 6.08 M/UL (ref 4.1–5.7)
SODIUM SERPL-SCNC: 138 MMOL/L (ref 136–145)
WBC # BLD AUTO: 6.1 K/UL (ref 4.1–11.1)

## 2017-10-29 PROCEDURE — 94640 AIRWAY INHALATION TREATMENT: CPT

## 2017-10-29 PROCEDURE — 99284 EMERGENCY DEPT VISIT MOD MDM: CPT

## 2017-10-29 PROCEDURE — 80053 COMPREHEN METABOLIC PANEL: CPT | Performed by: PHYSICIAN ASSISTANT

## 2017-10-29 PROCEDURE — 74011250636 HC RX REV CODE- 250/636: Performed by: PHYSICIAN ASSISTANT

## 2017-10-29 PROCEDURE — 77030013140 HC MSK NEB VYRM -A

## 2017-10-29 PROCEDURE — 74011000250 HC RX REV CODE- 250: Performed by: PHYSICIAN ASSISTANT

## 2017-10-29 PROCEDURE — 96375 TX/PRO/DX INJ NEW DRUG ADDON: CPT

## 2017-10-29 PROCEDURE — 74011636637 HC RX REV CODE- 636/637: Performed by: PHYSICIAN ASSISTANT

## 2017-10-29 PROCEDURE — 85025 COMPLETE CBC W/AUTO DIFF WBC: CPT | Performed by: PHYSICIAN ASSISTANT

## 2017-10-29 PROCEDURE — 96365 THER/PROPH/DIAG IV INF INIT: CPT

## 2017-10-29 PROCEDURE — 71020 XR CHEST PA LAT: CPT

## 2017-10-29 PROCEDURE — 36415 COLL VENOUS BLD VENIPUNCTURE: CPT | Performed by: PHYSICIAN ASSISTANT

## 2017-10-29 RX ORDER — PROMETHAZINE HYDROCHLORIDE AND CODEINE PHOSPHATE 6.25; 1 MG/5ML; MG/5ML
5 SOLUTION ORAL
Qty: 120 ML | Refills: 0 | Status: SHIPPED | OUTPATIENT
Start: 2017-10-29 | End: 2017-11-14

## 2017-10-29 RX ORDER — IPRATROPIUM BROMIDE AND ALBUTEROL SULFATE 2.5; .5 MG/3ML; MG/3ML
3 SOLUTION RESPIRATORY (INHALATION)
Status: COMPLETED | OUTPATIENT
Start: 2017-10-29 | End: 2017-10-29

## 2017-10-29 RX ORDER — PREDNISONE 10 MG/1
TABLET ORAL
Qty: 48 TAB | Refills: 0 | Status: SHIPPED | OUTPATIENT
Start: 2017-10-29 | End: 2017-11-14 | Stop reason: ALTCHOICE

## 2017-10-29 RX ORDER — PREDNISONE 20 MG/1
60 TABLET ORAL
Status: COMPLETED | OUTPATIENT
Start: 2017-10-29 | End: 2017-10-29

## 2017-10-29 RX ORDER — SULFAMETHOXAZOLE AND TRIMETHOPRIM 800; 160 MG/1; MG/1
1 TABLET ORAL 2 TIMES DAILY
COMMUNITY
End: 2017-11-03

## 2017-10-29 RX ORDER — PROMETHAZINE HYDROCHLORIDE AND CODEINE PHOSPHATE 6.25; 1 MG/5ML; MG/5ML
SOLUTION ORAL
COMMUNITY
End: 2017-10-29

## 2017-10-29 RX ORDER — DOXYCYCLINE 100 MG/1
100 CAPSULE ORAL 2 TIMES DAILY
COMMUNITY
End: 2017-11-03 | Stop reason: ALTCHOICE

## 2017-10-29 RX ORDER — MAGNESIUM SULFATE HEPTAHYDRATE 40 MG/ML
2 INJECTION, SOLUTION INTRAVENOUS
Status: COMPLETED | OUTPATIENT
Start: 2017-10-29 | End: 2017-10-29

## 2017-10-29 RX ADMIN — MAGNESIUM SULFATE HEPTAHYDRATE 2 G: 40 INJECTION, SOLUTION INTRAVENOUS at 18:30

## 2017-10-29 RX ADMIN — METHYLPREDNISOLONE SODIUM SUCCINATE 125 MG: 125 INJECTION, POWDER, FOR SOLUTION INTRAMUSCULAR; INTRAVENOUS at 18:30

## 2017-10-29 RX ADMIN — IPRATROPIUM BROMIDE AND ALBUTEROL SULFATE 3 ML: .5; 3 SOLUTION RESPIRATORY (INHALATION) at 17:33

## 2017-10-29 RX ADMIN — IPRATROPIUM BROMIDE AND ALBUTEROL SULFATE 3 ML: .5; 3 SOLUTION RESPIRATORY (INHALATION) at 18:30

## 2017-10-29 RX ADMIN — PREDNISONE 60 MG: 20 TABLET ORAL at 17:32

## 2017-10-29 NOTE — DISCHARGE INSTRUCTIONS
Asthma Attack: Care Instructions  Your Care Instructions    During an asthma attack, the airways swell and narrow. This makes it hard to breathe. Severe asthma attacks can be life-threatening, but you can help prevent them by keeping your asthma under control and treating symptoms before they get bad. Symptoms include being short of breath, having chest tightness, coughing, and wheezing. Noting and treating these symptoms can also help you avoid future trips to the emergency room. The doctor has checked you carefully, but problems can develop later. If you notice any problems or new symptoms, get medical treatment right away. Follow-up care is a key part of your treatment and safety. Be sure to make and go to all appointments, and call your doctor if you are having problems. It's also a good idea to know your test results and keep a list of the medicines you take. How can you care for yourself at home? · Follow your asthma action plan to prevent and treat attacks. If you don't have an asthma action plan, work with your doctor to create one. · Take your asthma medicines exactly as prescribed. Talk to your doctor right away if you have any questions about how to take them. ¨ Use your quick-relief medicine when you have symptoms of an attack. Quick-relief medicine is usually an albuterol inhaler. Some people need to use quick-relief medicine before they exercise. ¨ Take your controller medicine every day, not just when you have symptoms. Controller medicine is usually an inhaled corticosteroid. The goal is to prevent problems before they occur. Don't use your controller medicine to treat an attack that has already started. It doesn't work fast enough to help. ¨ If your doctor prescribed corticosteroid pills to use during an attack, take them exactly as prescribed. It may take hours for the pills to work, but they may make the episode shorter and help you breathe better.   ¨ Keep your quick-relief medicine with you at all times. · Talk to your doctor before using other medicines. Some medicines, such as aspirin, can cause asthma attacks in some people. · If you have a peak flow meter, use it to check how well you are breathing. This can help you predict when an asthma attack is going to occur. Then you can take medicine to prevent the asthma attack or make it less severe. · Do not smoke or allow others to smoke around you. Avoid smoky places. Smoking makes asthma worse. If you need help quitting, talk to your doctor about stop-smoking programs and medicines. These can increase your chances of quitting for good. · Learn what triggers an asthma attack for you, and avoid the triggers when you can. Common triggers include colds, smoke, air pollution, dust, pollen, mold, pets, cockroaches, stress, and cold air. · Avoid colds and the flu. Get a pneumococcal vaccine shot. If you have had one before, ask your doctor if you need a second dose. Get a flu vaccine every fall. If you must be around people with colds or the flu, wash your hands often. When should you call for help? Call 911 anytime you think you may need emergency care. For example, call if:  ? · You have severe trouble breathing. ?Call your doctor now or seek immediate medical care if:  ? · Your symptoms do not get better after you have followed your asthma action plan. ? · You have new or worse trouble breathing. ? · Your coughing and wheezing get worse. ? · You cough up dark brown or bloody mucus (sputum). ? · You have a new or higher fever. ? Watch closely for changes in your health, and be sure to contact your doctor if:  ? · You need to use quick-relief medicine on more than 2 days a week (unless it is just for exercise). ? · You cough more deeply or more often, especially if you notice more mucus or a change in the color of your mucus. ? · You are not getting better as expected. Where can you learn more?   Go to http://maria elena-karol.info/. Enter G427 in the search box to learn more about \"Asthma Attack: Care Instructions. \"  Current as of: May 12, 2017  Content Version: 11.4  © 5636-5065 [employer]. Care instructions adapted under license by [employer] (which disclaims liability or warranty for this information). If you have questions about a medical condition or this instruction, always ask your healthcare professional. [address] any warranty or liability for your use of this information. We hope that we have addressed all of your medical concerns. The examination and treatment you received in the Emergency Department were for an emergent problem and were not intended as complete care. It is important that you follow up with your healthcare provider(s) for ongoing care. If your symptoms worsen or do not improve as expected, and you are unable to reach your usual health care provider(s), you should return to the Emergency Department. Today's healthcare is undergoing tremendous change, and patient satisfaction surveys are one of the many tools to assess the quality of medical care. You may receive a survey from the [employer] regarding your experience in the Emergency Department. I hope that your experience has been completely positive, particularly the medical care that I provided. As such, please participate in the survey; anything less than excellent does not meet my expectations or intentions. [facility] and [facility] participate in nationally recognized quality of care measures. If your blood pressure is greater than 120/80, as reported below, we urge that you seek medical care to address the potential of high blood pressure, commonly known as hypertension.    Hypertension can be hereditary or can be caused by certain medical conditions, pain, stress, or \"white coat syndrome. \"       Please make an appointment with your health care provider(s) for follow up of your Emergency Department visit. VITALS:   Patient Vitals for the past 8 hrs:   Temp Pulse Resp BP SpO2   10/29/17 1854 - - - - 99 %   10/29/17 1845 - - - - 99 %   10/29/17 1830 - - - - 98 %   10/29/17 1815 - - - - 98 %   10/29/17 1800 - - - (!) 127/96 -   10/29/17 1737 - - - - 98 %   10/29/17 1730 - - - (!) 131/97 99 %   10/29/17 1714 97.8 °F (36.6 °C) 92 18 (!) 158/94 98 %          Thank you for allowing us to provide you with medical care today. We realize that you have many choices for your emergency care needs. Please choose us in the future for any continued health care needs. Mitzimaggie Eloinaclif [address].   Office: [phone]            Recent Results (from the past 24 hour(s))   CBC WITH AUTOMATED DIFF    Collection Time: 10/29/17  6:27 PM   Result Value Ref Range    WBC 6.1 4.1 - 11.1 K/uL    RBC 6.08 (H) 4.10 - 5.70 M/uL    HGB 16.4 12.1 - 17.0 g/dL    HCT 47.3 36.6 - 50.3 %    MCV 77.8 (L) 80.0 - 99.0 FL    MCH 27.0 26.0 - 34.0 PG    MCHC 34.7 30.0 - 36.5 g/dL    RDW 14.1 11.5 - 14.5 %    PLATELET 591 952 - 520 K/uL    NEUTROPHILS 40 32 - 75 %    LYMPHOCYTES 45 12 - 49 %    MONOCYTES 5 5 - 13 %    EOSINOPHILS 10 (H) 0 - 7 %    BASOPHILS 0 0 - 1 %    ABS. NEUTROPHILS 2.5 1.8 - 8.0 K/UL    ABS. LYMPHOCYTES 2.7 0.8 - 3.5 K/UL    ABS. MONOCYTES 0.3 0.0 - 1.0 K/UL    ABS. EOSINOPHILS 0.6 (H) 0.0 - 0.4 K/UL    ABS.  BASOPHILS 0.0 0.0 - 0.1 K/UL   METABOLIC PANEL, COMPREHENSIVE    Collection Time: 10/29/17  6:27 PM   Result Value Ref Range    Sodium 138 136 - 145 mmol/L    Potassium 3.4 (L) 3.5 - 5.1 mmol/L    Chloride 106 97 - 108 mmol/L    CO2 23 21 - 32 mmol/L    Anion gap 9 5 - 15 mmol/L    Glucose 159 (H) 65 - 100 mg/dL    BUN 14 6 - 20 MG/DL    Creatinine 1.38 (H) 0.70 - 1.30 MG/DL    BUN/Creatinine ratio 10 (L) 12 - 20      GFR est AA >60 >60 ml/min/1.73m2    GFR est non-AA 54 (L) >60 ml/min/1.73m2    Calcium 8.5 8.5 - 10.1 MG/DL    Bilirubin, total 0.8 0.2 - 1.0 MG/DL    ALT (SGPT) 27 12 - 78 U/L    AST (SGOT) 20 15 - 37 U/L    Alk. phosphatase 79 45 - 117 U/L    Protein, total 6.9 6.4 - 8.2 g/dL    Albumin 3.3 (L) 3.5 - 5.0 g/dL    Globulin 3.6 2.0 - 4.0 g/dL    A-G Ratio 0.9 (L) 1.1 - 2.2         Xr Chest Pa Lat    Result Date: 10/29/2017  INDICATION:  Shortness of breath and cough. COMPARISON:  8/30/2015. FINDINGS:  PA and lateral views were obtained of the chest.  The heart is normal in size. Tiny old right upper lobe residuals are seen. No consolidation, pulmonary edema, or pleural effusion is evident. The regional osseous structures are unremarkable. IMPRESSION:  No evidence of pneumonia.

## 2017-10-29 NOTE — ED PROVIDER NOTES
HPI Comments: 47 y.o. male with past medical history significant for asthma, DM, allergic rhinitis, and high cholesterol who presents to the ED with chief complaint of wheezing. Pt reports wheezing onset about 3 weeks ago accompanied by a productive cough with white sputum, SOB, and chest congestion, says he thinks he is having an asthma attack. Pt states he was seen at [facility] for his sx and was prescribed promethazine with codeine syrup and bactrim. Pt states he has also been using nebulizer treatments every 4 hours, inhalers, and nasal spray without relief. Pt denies fever. There are no other acute medical complaints voiced at this time. Patient denies chest pain, denies leg pain or swelling. Denies recent long car ride or plane flight. Social Hx: Denies smoking tobacco or EtOH use. PCP: Riky Pritchett MD    Note written by Colleen Weinberg, as dictated by MICHAEL Ortiz 5:15 PM     The history is provided by the patient. Past Medical History:   Diagnosis Date    Allergic rhinitis 2/21/2014    Asthma     Diabetes ([facility] [address]     History of seasonal allergies     Spots in front of the eye        Past Surgical History:   Procedure Laterality Date    HX ORTHOPAEDIC      L arm surgery R/T broken bone    HX OTHER SURGICAL      R eye surg to remove spot on eye. No family history on file. Social History     Social History    Marital status:      Spouse name: N/A    Number of children: N/A    Years of education: N/A     Occupational History    Not on file. Social History Main Topics    Smoking status: Never Smoker    Smokeless tobacco: Never Used    Alcohol use No    Drug use: No    Sexual activity: Not on file     Other Topics Concern    Not on file     Social History Narrative         ALLERGIES: Pcn [penicillins]    Review of Systems   Constitutional: Negative. Negative for fever. HENT: Positive for congestion. Eyes: Negative.     Respiratory: Positive for cough, shortness of breath and wheezing. Cardiovascular: Negative. Gastrointestinal: Negative. Endocrine: Negative. Genitourinary: Negative. Musculoskeletal: Negative. Skin: Negative. Allergic/Immunologic: Negative. Neurological: Negative. Hematological: Negative. Psychiatric/Behavioral: Negative. All other systems reviewed and are negative. Vitals:    10/29/17 1714   BP: (!) 158/94   Pulse: 92   Resp: 18   Temp: 97.8 °F (36.6 °C)   SpO2: 98%   Weight: 81.6 kg (180 lb)   Height: 5' 4\" (1.626 m)            Physical Exam   Constitutional: He is oriented to person, place, and time. He appears well-developed and well-nourished. Non-toxic appearance. He does not have a sickly appearance. He does not appear ill. No distress. Patient is well appearing and in no acute distress. Patient is non-toxic appearing. Patient is well appearing, able to speak in full sentences with out difficulty. HENT:   Head: Normocephalic and atraumatic. Right Ear: External ear normal.   Left Ear: External ear normal.   Mouth/Throat: Oropharynx is clear and moist. No oropharyngeal exudate. Eyes: Conjunctivae and EOM are normal. Pupils are equal, round, and reactive to light. Right eye exhibits no discharge. Left eye exhibits no discharge. No scleral icterus. Neck: Normal range of motion. Neck supple. No tracheal deviation present. No thyromegaly present. Cardiovascular: Normal rate, regular rhythm, normal heart sounds and intact distal pulses. No murmur heard. Pulmonary/Chest: Effort normal. No respiratory distress. He has wheezes (mild expiratory wheezing). He has no rales. Abdominal: Soft. Bowel sounds are normal. He exhibits no distension. There is no tenderness. There is no rebound and no guarding. Musculoskeletal: Normal range of motion. He exhibits no edema or tenderness. Lymphadenopathy:     He has no cervical adenopathy.    Neurological: He is alert and oriented to person, place, and time. No cranial nerve deficit. Coordination normal.   Skin: Skin is warm. No rash noted. No erythema. Psychiatric: He has a normal mood and affect. His behavior is normal. Judgment and thought content normal.   Nursing note and vitals reviewed. MDM  Number of Diagnoses or Management Options  Mild persistent asthma with acute exacerbation:   Diagnosis management comments: Assesment/Plan- 47 y.o. Patient presents with:  Cough  Shortness of Breath  differential includes: asthma exacerbation, bronchitis, pneumonia. Labs and imaging reviewed with no acute findings. Patient improved after breathing treatments and medications in ED. Recommend PCP, pulmonology follow up. Patient educated on reasons to return to the ED.          Amount and/or Complexity of Data Reviewed  Clinical lab tests: ordered and reviewed  Tests in the radiology section of CPT®: ordered and reviewed  Discuss the patient with other providers: yes (Attending- Dr. Gamal Blanca)      ED Course       Procedures

## 2017-10-29 NOTE — ED TRIAGE NOTES
Pt c/o asthma attack, wheezing, and coughing. Pt went to [facility] was given abx and cough syrup without relief. Pt was also taking some old abx that he has not finished from a few months ago. Pt has also been using is at home nebulizer.

## 2017-10-29 NOTE — ED NOTES
IV Magnesium and 2nd Duoneb tx completed. Pt reports some improvement in his breathing.  Awaiting disposition

## 2017-10-29 NOTE — ED NOTES
I have reviewed discharge instructions with the patient. The patient verbalized understanding. Pt confirmed understanding of need for follow up with primary care provider. Pt is not in any current distress and shows no evidence of clinical instability. . Pt left with all personal belongings. Pt states they are driving. Pt states chief complaint has improved with treatment provided    PT is alert and oriented x 4, Pt is hemodynamically/respiratorily stable. Paperwork given by provider and reviewed with patient, opportunity for questions/clarification given.

## 2017-11-01 ENCOUNTER — TELEPHONE (OUTPATIENT)
Dept: FAMILY MEDICINE CLINIC | Age: 52
End: 2017-11-01

## 2017-11-01 ENCOUNTER — PATIENT OUTREACH (OUTPATIENT)
Dept: FAMILY MEDICINE CLINIC | Age: 52
End: 2017-11-01

## 2017-11-01 NOTE — TELEPHONE ENCOUNTER
Was recently in ER at [address] [place] [address] for shortness of breath. Calling for further instructions on taking prednisone and wanting lab results. Please call [phone] to address.

## 2017-11-01 NOTE — PROGRESS NOTES
[facility]  ED  Discharge Follow-Up        Patient discharged from [facility] for Mild persistent asthma with acute exacerbation. Panel Manager contacted the patient by telephone to perform post ED discharge assessment. Verified  and address with patient as identifiers. Provided introduction to self, and explanation of the Panel Manger role. Medication: Patient discharged with new medications (sterapred 10mg and phenergan w/ codeine 6.25/10mg)  Performed medication reconciliation with patient, and patient verbalizes understanding of administration of home medications. There were no barriers to obtaining medications identified at this time. Discharge Instructions :  Reviewed discharge instructions with patient. Patient verbalizes understanding of discharge instructions and follow-up care. Goals      Attends follow-up appointments as directed. 17- Patient have appt scheduled with Dr. Dequan Buck for 11/3/17       Reduce risk of ASTHMA exacerbations and complications. 17 Patient will take all prescribed medications as prescribe and follow up with Pulmonary              PCP/Specialist follow up: Patient scheduled to follow up with  Dr. Dequan uBck on 11/3/17. Patient given an opportunity to ask questions. No other clinical/social/functional needs noted. The patient agrees to contact the PCP office for questions related to their healthcare. The patient expressed thanks, offered no additional questions and ended the call.

## 2017-11-03 ENCOUNTER — OFFICE VISIT (OUTPATIENT)
Dept: FAMILY MEDICINE CLINIC | Age: 52
End: 2017-11-03

## 2017-11-03 VITALS
HEART RATE: 91 BPM | OXYGEN SATURATION: 99 % | DIASTOLIC BLOOD PRESSURE: 88 MMHG | RESPIRATION RATE: 20 BRPM | HEIGHT: 64 IN | WEIGHT: 180 LBS | BODY MASS INDEX: 30.73 KG/M2 | SYSTOLIC BLOOD PRESSURE: 120 MMHG | TEMPERATURE: 97 F

## 2017-11-03 DIAGNOSIS — J45.40 MODERATE PERSISTENT ASTHMA WITHOUT COMPLICATION: ICD-10-CM

## 2017-11-03 DIAGNOSIS — J45.901 EXACERBATION OF ASTHMA, UNSPECIFIED ASTHMA SEVERITY, UNSPECIFIED WHETHER PERSISTENT: Primary | ICD-10-CM

## 2017-11-03 RX ORDER — LEVOFLOXACIN 500 MG/1
500 TABLET, FILM COATED ORAL DAILY
Qty: 5 TAB | Refills: 0 | Status: SHIPPED | OUTPATIENT
Start: 2017-11-03 | End: 2017-11-08

## 2017-11-03 RX ORDER — FLUTICASONE PROPIONATE 50 MCG
SPRAY, SUSPENSION (ML) NASAL
Qty: 1 BOTTLE | Refills: 11 | Status: SHIPPED | OUTPATIENT
Start: 2017-11-03 | End: 2018-03-28 | Stop reason: SDUPTHER

## 2017-11-03 NOTE — MR AVS SNAPSHOT
Visit Information Date & Time Provider Department Dept. Phone Encounter #  
 11/3/2017 11:00 AM Evangelista Marks [place] 35 [id number] Follow-up Instructions Return in about 6 weeks (around 12/15/2017) for asthma. Your Appointments 2/19/2018 11:30 AM  
ROUTINE CARE with Sariah Coy MD  
Care [facility] Endocrinology [facility]) Appt Note: 6 mo fu  
 [address] [facility] [address]  
[phone]  
  
   
 [address] [place] [id number] Upcoming Health Maintenance Date Due  
 FOOT EXAM Q1 10/29/1976 EYE EXAM RETINAL OR DILATED Q1 10/29/1976 FOBT Q 1 YEAR AGE 50-75 10/29/2016 MICROALBUMIN Q1 2/17/2018 HEMOGLOBIN A1C Q6M 2/21/2018 LIPID PANEL Q1 8/14/2018 DTaP/Tdap/Td series (2 - Td) 11/3/2027 Allergies as of 11/3/2017  Review Complete On: 11/3/2017 By: Evangelista Marks MD  
  
 Severity Noted Reaction Type Reactions Pcn [Penicillins]  10/11/2011    Other (comments) Father told him as child he was allergic to PCN. He does not know if he has every been on a cephalosporin that he has tolerated Current Immunizations  Reviewed on 9/26/2012 Name Date PPD 9/24/2012 Not reviewed this visit You Were Diagnosed With   
  
 Codes Comments Exacerbation of asthma, unspecified asthma severity, unspecified whether persistent    -  Primary ICD-10-CM: J45.901 ICD-9-CM: 359.32 Moderate persistent asthma without complication     NGL-95-KG: J45.40 ICD-9-CM: 493.90 Vitals BP Pulse Temp Resp Height(growth percentile) Weight(growth percentile) 120/88 91 97 °F (36.1 °C) (Oral) 20 5' 4\" (1.626 m) 180 lb (81.6 kg) SpO2 PF BMI Smoking Status 99% 300 L/min 30.9 kg/m2 Never Smoker Vitals History BMI and BSA Data Body Mass Index Body Surface Area 30.9 kg/m 2 1.92 m 2 Preferred Pharmacy Pharmacy Name Phone [facility] [address] [facility] [facility] [phone] Your Updated Medication List  
  
   
This list is accurate as of: 11/3/17 11:41 AM.  Always use your most recent med list.  
  
  
  
  
 * albuterol 90 mcg/actuation inhaler Commonly known as:  VENTOLIN HFA INHALE 1 TO 2 PUFFS BY MOUTH EVERY 4 HOURS AS NEEDED FOR WHEEZING  
  
 * albuterol 2.5 mg /3 mL (0.083 %) nebulizer solution Commonly known as:  PROVENTIL VENTOLIN  
3 mL by Nebulization route every four (4) hours as needed for Wheezing. atorvastatin 40 mg tablet Commonly known as:  LIPITOR Take 1 Tab by mouth daily. beclomethasone 80 mcg/actuation [employer] Commonly known as:  QVAR Take 2 Puffs by inhalation two (2) times a day. fluticasone 50 mcg/actuation nasal spray Commonly known as:  FLONASE  
USE 2 SPRAYS IN BOTH NOSTRILS EVERY DAY  
  
 levoFLOXacin 500 mg tablet Commonly known as:  Duaine Hurter Take 1 Tab by mouth daily for 5 days. metFORMIN  mg tablet Commonly known as:  GLUCOPHAGE XR Take 1 Tab by mouth daily. For DM2 predniSONE 10 mg dose pack Commonly known as:  STERAPRED DS As directed  
  
 promethazine-codeine 6.25-10 mg/5 mL syrup Commonly known as:  PHENERGAN with CODEINE Take 5 mL by mouth four (4) times daily as needed for Cough. Max Daily Amount: 20 mL. SOOTHE XP 1-4.5 % Drop ophthalmic solution Generic drug:  light mineral oil-mineral oil INT ONE GTT IN OU QID PRN  
  
 * Notice: This list has 2 medication(s) that are the same as other medications prescribed for you. Read the directions carefully, and ask your doctor or other care provider to review them with you. Prescriptions Sent to Pharmacy Refills  
 fluticasone (FLONASE) 50 mcg/actuation nasal spray 11 Sig: USE 2 SPRAYS IN BOTH NOSTRILS EVERY DAY  Class: Normal  
 Pharmacy: [facility] [address] [place] [address] Ph #: [phone]  
 beclomethasone (QVAR) 80 mcg/actuation aero 1 Sig: Take 2 Puffs by inhalation two (2) times a day. Class: Normal  
 Pharmacy: [facility], [address] [address] [facility] [address] [facility] Ph #: [phone] Route: Inhalation  
 levoFLOXacin (LEVAQUIN) 500 mg tablet 0 Sig: Take 1 Tab by mouth daily for 5 days. Class: Normal  
 Pharmacy: [facility], [address] [facility] Ph #: [phone] Route: Oral  
  
We Performed the Following REFERRAL TO PULMONARY DISEASE [GQZ66 Custom] Comments:  
 asthma Follow-up Instructions Return in about 6 weeks (around 12/15/2017) for asthma. Referral Information Referral ID Referred By Referred To  
  
 [id number] [facility] [address] [place] [address] Visits Status Start Date End Date 1 New Request 11/3/17 11/3/18 If your referral has a status of pending review or denied, additional information will be sent to support the outcome of this decision. Patient Instructions Asthma in Adults: Care Instructions Your Care Instructions During an asthma attack, your airways swell and narrow as a reaction to certain things (triggers). This makes it hard to breathe. You may be able to prevent asthma attacks if you avoid the things that set off your asthma symptoms. Keeping your asthma under control and treating symptoms before they get bad can help you avoid severe attacks. If you can control your asthma, you may be able to do all of your normal daily activities. You may also avoid asthma attacks and trips to the hospital. 
Follow-up care is a key part of your treatment and safety.  Be sure to make and go to all appointments, and call your doctor if you are having problems. It's also a good idea to know your test results and keep a list of the medicines you take. How can you care for yourself at home? · Follow your asthma action plan so you can manage your symptoms at home. An asthma action plan will help you prevent and control airway reactions and will tell you what to do during an asthma attack. If you do not have an asthma action plan, work with your doctor to build one. · Take your asthma medicine exactly as prescribed. Medicine plays an important role in controlling asthma. Talk to your doctor right away if you have any questions about what to take and how to take it. ¨ Use your quick-relief medicine when you have symptoms of an attack. Quick-relief medicine often is an albuterol inhaler. Some people need to use quick-relief medicine before they exercise. ¨ Take your controller medicine every day, not just when you have symptoms. Controller medicine is usually an inhaled corticosteroid. The goal is to prevent problems before they occur. Do not use your controller medicine to try to treat an attack that has already started. It does not work fast enough to help. ¨ If your doctor prescribed corticosteroid pills to use during an attack, take them as directed. They may take hours to work, but they may shorten the attack and help you breathe better. ¨ Keep your quick-relief medicine with you at all times. · Talk to your doctor before using other medicines. Some medicines, such as aspirin, can cause asthma attacks in some people. · Check yourself for asthma symptoms to know which step to follow in your action plan. Watch for things like being short of breath, having chest tightness, coughing, and wheezing. Also notice if symptoms wake you up at night or if you get tired quickly when you exercise.  
· If you have a peak flow meter, use it to check how well you are breathing. This can help you predict when an asthma attack is going to occur. Then you can take medicine to prevent the asthma attack or make it less severe. · See your doctor regularly. These visits will help you learn more about asthma and what you can do to control it. Your doctor will monitor your treatment to make sure the medicine is helping you. · Keep track of your asthma attacks and your treatment. After you have had an attack, write down what triggered it, what helped end it, and any concerns you have about your asthma action plan. Take your diary when you see your doctor. You can then review your asthma action plan and decide if it is working. · Do not smoke or allow others to smoke around you. Avoid smoky places. Smoking makes asthma worse. If you need help quitting, talk to your doctor about stop-smoking programs and medicines. These can increase your chances of quitting for good. · Learn what triggers an asthma attack for you, and avoid the triggers when you can. Common triggers include colds, smoke, air pollution, dust, pollen, mold, pets, cockroaches, stress, and cold air. · Avoid colds and the flu. Get a pneumococcal vaccine shot. If you have had one before, ask your doctor whether you need a second dose. Get a flu vaccine every fall. If you must be around people with colds or the flu, wash your hands often. When should you call for help? Call 911 anytime you think you may need emergency care. For example, call if: 
? · You have severe trouble breathing. ?Call your doctor now or seek immediate medical care if: 
? · Your symptoms do not get better after you have followed your asthma action plan. ? · You cough up yellow, dark brown, or bloody mucus (sputum). ? Watch closely for changes in your health, and be sure to contact your doctor if: 
? · Your coughing and wheezing get worse. ? · You need to use quick-relief medicine on more than 2 days a week (unless it is just for exercise). ? · You need help figuring out what is triggering your asthma attacks. Where can you learn more? Go to http://maria elena-karol.info/. Enter P597 in the search box to learn more about \"Asthma in Adults: Care Instructions. \" Current as of: May 12, 2017 Content Version: 11.4 © 9954-5567 [employer]. Care instructions adapted under license by [employer] (which disclaims liability or warranty for this information). If you have questions about a medical condition or this instruction, always ask your healthcare professional. [address] any warranty or liability for your use of this information. Introducing [facility]! [employer] introduces [employer] patient portal. Now you can access parts of your medical record, email your doctor's office, and request medication refills online. 1. In your internet browser, go to [email or web address]. [email or web address] 2. Click on the First Time User? Click Here link in the Sign In box. You will see the New Member Sign Up page. 3. Enter your [employer] Access Code exactly as it appears below. You will not need to use this code after youve completed the sign-up process. If you do not sign up before the expiration date, you must request a new code. · [employer] Access Code: [email or web address] Expires: 11/19/2017 12:28 PM 
 
4. Enter the last four digits of your Social Security Number (xxxx) and Date of Birth (mm/dd/yyyy) as indicated and click Submit. You will be taken to the next sign-up page. 5. Create a [employer] ID. This will be your Top[employer] login ID and cannot be changed, so think of one that is secure and easy to remember. 6. Create a [employer] password. You can change your password at any time. 7. Enter your Password Reset Question and Answer. This can be used at a later time if you forget your password. 8. Enter your e-mail address.  You will receive e-mail notification when new information is available in [employer]. 9. Click Sign Up. You can now view and download portions of your medical record. 10. Click the Download Summary menu link to download a portable copy of your medical information. If you have questions, please visit the Frequently Asked Questions section of the [employer] website. Remember, [employer] is NOT to be used for urgent needs. For medical emergencies, dial 911. Now available from your iPhone and Android! Please provide this summary of care documentation to your next provider. Your primary care clinician is listed as Cecile Hinds. If you have any questions after today's visit, please call [phone].

## 2017-11-03 NOTE — PATIENT INSTRUCTIONS

## 2017-11-03 NOTE — PROGRESS NOTES
Chief Complaint   Patient presents with    Asthma     ED follow up     Shortness of Breath       Health Maintenance   Topic Date Due    FOOT EXAM Q1  10/29/1976    EYE EXAM RETINAL OR DILATED Q1  10/29/1976    Pneumococcal 19-64 Medium Risk (1 of 1 - PPSV23) 10/29/1985    DTaP/Tdap/Td series (1 - Tdap) 10/29/1987    FOBT Q 1 YEAR AGE 50-75  10/29/2016    INFLUENZA AGE 9 TO ADULT  08/01/2017    MICROALBUMIN Q1  02/17/2018    HEMOGLOBIN A1C Q6M  02/21/2018    LIPID PANEL Q1  08/14/2018

## 2017-11-14 ENCOUNTER — OFFICE VISIT (OUTPATIENT)
Dept: FAMILY MEDICINE CLINIC | Age: 52
End: 2017-11-14

## 2017-11-14 VITALS
RESPIRATION RATE: 18 BRPM | WEIGHT: 180 LBS | DIASTOLIC BLOOD PRESSURE: 83 MMHG | SYSTOLIC BLOOD PRESSURE: 128 MMHG | HEIGHT: 64 IN | BODY MASS INDEX: 30.73 KG/M2 | HEART RATE: 83 BPM | TEMPERATURE: 97.3 F

## 2017-11-14 DIAGNOSIS — Z12.11 ENCOUNTER FOR FIT (FECAL IMMUNOCHEMICAL TEST) SCREENING: ICD-10-CM

## 2017-11-14 DIAGNOSIS — R05.9 COUGH: ICD-10-CM

## 2017-11-14 DIAGNOSIS — K21.9 GASTROESOPHAGEAL REFLUX DISEASE, ESOPHAGITIS PRESENCE NOT SPECIFIED: Primary | ICD-10-CM

## 2017-11-14 DIAGNOSIS — E11.9 DIABETES MELLITUS WITHOUT COMPLICATION (HCC): ICD-10-CM

## 2017-11-14 RX ORDER — BENZONATATE 200 MG/1
200 CAPSULE ORAL
Qty: 30 CAP | Refills: 0 | Status: SHIPPED | OUTPATIENT
Start: 2017-11-14 | End: 2018-03-28

## 2017-11-14 RX ORDER — RANITIDINE 300 MG/1
300 TABLET ORAL DAILY
Qty: 30 TAB | Refills: 1 | Status: SHIPPED | OUTPATIENT
Start: 2017-11-14 | End: 2018-03-28

## 2017-11-14 RX ORDER — SULFAMETHOXAZOLE AND TRIMETHOPRIM 800; 160 MG/1; MG/1
1 TABLET ORAL 2 TIMES DAILY
COMMUNITY
End: 2018-02-19 | Stop reason: SDUPTHER

## 2017-11-14 NOTE — PATIENT INSTRUCTIONS

## 2017-11-14 NOTE — PROGRESS NOTES
HISTORY OF PRESENT ILLNESS  Adair Rosales is a 47 y.o. male. Heartburn    The history is provided by the patient. This is a new problem. Episode onset: 4 days ago. Episode frequency: almost constantly. The problem has not changed since onset. Associated with: lying down at night. The patient is experiencing no pain (just burning). Pertinent negatives include no belching, no nausea and no vomiting. Exacerbated by: lying down at night. Relieved by: OTC anitacids. His past medical history does not include GERD. Review of Systems   Constitutional: Negative for weight loss. Respiratory: Positive for cough. Negative for shortness of breath and wheezing. Coughing only at night   Gastrointestinal: Positive for heartburn. Negative for abdominal pain, nausea and vomiting. Visit Vitals    /83    Pulse 83    Temp 97.3 °F (36.3 °C) (Oral)    Resp 18    Ht 5' 4\" (1.626 m)    Wt 180 lb (81.6 kg)    BMI 30.9 kg/m2     Physical Exam   Constitutional: He is oriented to person, place, and time. He appears well-developed and well-nourished. No distress. Cardiovascular: Normal rate, regular rhythm, normal heart sounds and intact distal pulses. Exam reveals no gallop and no friction rub. No murmur heard. Pulmonary/Chest: Effort normal and breath sounds normal. No respiratory distress. He has no wheezes. He has no rales. Musculoskeletal: He exhibits no edema. Neurological: He is alert and oriented to person, place, and time. Normal monofilament exam   Skin: Skin is warm and dry. He is not diaphoretic. Feet and nails in good condition. Nursing note and vitals reviewed. ASSESSMENT and PLAN    ICD-10-CM ICD-9-CM    1. Gastroesophageal reflux disease, esophagitis presence not specified K21.9 530.81 raNITIdine (ZANTAC) 300 mg tablet   2. Cough R05 786.2 benzonatate (TESSALON) 200 mg capsule   3. Diabetes mellitus without complication (HCC) O41.2 250.00 HM DIABETES FOOT EXAM   4. Encounter for FIT (fecal immunochemical test) screening Z12.11 V76.51 OCCULT BLOOD, IMMUNOASSAY (FIT)        Likely gastroesophageal reflux disease  Cough likely due to gastroesophageal reflux disease  Start Zantac  Tessalon prn  Labs per orders. Foot exam  was performed. .  Sensory and motor testing was assessed . Pedal pulse(s) was assessed. Follow-up Disposition:  Return if not better in 2 weeks. Reviewed plan of care. Patient has provided input and agrees with goals.

## 2017-11-14 NOTE — MR AVS SNAPSHOT
Visit Information Date & Time Provider Department Dept. Phone Encounter #  
 11/14/2017  2:30 PM Sarah PaganAlma 34 [id number] Your Appointments 12/13/2017  9:00 AM  
ROUTINE CARE with Sarah Pagan MD  
[address] [facility]) Appt Note: 6 week follow up asthma  
 [address] [facility] [address]  
[phone]  
  
   
 [address] [facility] [address]  
  
    
 2/19/2018 11:30 AM  
ROUTINE CARE with Jaz Bolden MD  
Care Diabetes & Endocrinology [facility]) Appt Note: 6 mo fu  
 [address] [facility] [address]  
[phone]  
  
   
 [address] [place] [id number] Upcoming Health Maintenance Date Due  
 FOOT EXAM Q1 10/29/1976 EYE EXAM RETINAL OR DILATED Q1 10/29/1976 FOBT Q 1 YEAR AGE 50-75 10/29/2016 MICROALBUMIN Q1 2/17/2018 HEMOGLOBIN A1C Q6M 2/21/2018 LIPID PANEL Q1 8/14/2018 DTaP/Tdap/Td series (2 - Td) 11/3/2027 Allergies as of 11/14/2017  Review Complete On: 11/14/2017 By: Sarah Pagan MD  
  
 Severity Noted Reaction Type Reactions Pcn [Penicillins]  10/11/2011    Other (comments) Father told him as child he was allergic to PCN. He does not know if he has every been on a cephalosporin that he has tolerated Current Immunizations  Reviewed on 9/26/2012 Name Date PPD 9/24/2012 Not reviewed this visit You Were Diagnosed With   
  
 Codes Comments Gastroesophageal reflux disease, esophagitis presence not specified    -  Primary ICD-10-CM: K21.9 ICD-9-CM: 530.81 Cough     ICD-10-CM: R05 ICD-9-CM: 786.2 Diabetes mellitus without complication ([facility]     GRY-77-LR: E11.9 ICD-9-CM: 250.00 Encounter for FIT (fecal immunochemical test) screening     ICD-10-CM: Z12.11 ICD-9-CM: V76.51 Vitals BP Pulse Temp Resp Height(growth percentile) Weight(growth percentile) 128/83 83 97.3 °F (36.3 °C) (Oral) 18 5' 4\" (1.626 m) 180 lb (81.6 kg) BMI Smoking Status 30.9 kg/m2 Never Smoker Vitals History BMI and BSA Data Body Mass Index Body Surface Area 30.9 kg/m 2 1.92 m 2 Preferred Pharmacy Pharmacy Name Phone [facility] Rika [facility]all [facility] [address] AT [facility] [phone] Your Updated Medication List  
  
   
This list is accurate as of: 11/14/17  3:14 PM.  Always use your most recent med list.  
  
  
  
  
 * albuterol 90 mcg/actuation inhaler Commonly known as:  VENTOLIN HFA INHALE 1 TO 2 PUFFS BY MOUTH EVERY 4 HOURS AS NEEDED FOR WHEEZING  
  
 * albuterol 2.5 mg /3 mL (0.083 %) nebulizer solution Commonly known as:  PROVENTIL VENTOLIN  
3 mL by Nebulization route every four (4) hours as needed for Wheezing. atorvastatin 40 mg tablet Commonly known as:  LIPITOR Take 1 Tab by mouth daily. beclomethasone 80 mcg/actuation [employer] Commonly known as:  QVAR Take 2 Puffs by inhalation two (2) times a day. benzonatate 200 mg capsule Commonly known as:  TESSALON Take 1 Cap by mouth three (3) times daily as needed for Cough. fluticasone 50 mcg/actuation nasal spray Commonly known as:  FLONASE  
USE 2 SPRAYS IN BOTH NOSTRILS EVERY DAY  
  
 metFORMIN  mg tablet Commonly known as:  GLUCOPHAGE XR Take 1 Tab by mouth daily. For DM2  
  
 raNITIdine 300 mg tablet Commonly known as:  ZANTAC Take 1 Tab by mouth daily. SOOTHE XP 1-4.5 % Drop ophthalmic solution Generic drug:  light mineral oil-mineral oil INT ONE GTT IN OU QID PRN  
  
 trimethoprim-sulfamethoxazole 160-800 mg per tablet Commonly known as:  BACTRIM DS, SEPTRA DS Take 1 Tab by mouth two (2) times a day. * Notice:   This list has 2 medication(s) that are the same as other medications prescribed for you. Read the directions carefully, and ask your doctor or other care provider to review them with you. Prescriptions Sent to Pharmacy Refills  
 raNITIdine (ZANTAC) 300 mg tablet 1 Sig: Take 1 Tab by mouth daily. Class: Normal  
 Pharmacy: [address] Ph #: [phone] Route: Oral  
 benzonatate (TESSALON) 200 mg capsule 0 Sig: Take 1 Cap by mouth three (3) times daily as needed for Cough. Class: Normal  
 Pharmacy: [address] Ph #: [phone] Route: Oral  
  
We Performed the Following  DIABETES FOOT EXAM [[facility]] OCCULT BLOOD, IMMUNOASSAY (FIT) [id number] CPT(R)] Patient Instructions Gastroesophageal Reflux Disease (GERD): Care Instructions Your Care Instructions Gastroesophageal reflux disease (GERD) is the backward flow of stomach acid into the esophagus. The esophagus is the tube that leads from your throat to your stomach. A one-way valve prevents the stomach acid from moving up into this tube. When you have GERD, this valve does not close tightly enough. If you have mild GERD symptoms including heartburn, you may be able to control the problem with antacids or over-the-counter medicine. Changing your diet, losing weight, and making other lifestyle changes can also help reduce symptoms. Follow-up care is a key part of your treatment and safety. Be sure to make and go to all appointments, and call your doctor if you are having problems. It's also a good idea to know your test results and keep a list of the medicines you take. How can you care for yourself at home? · Take your medicines exactly as prescribed. Call your doctor if you think you are having a problem with your medicine. · Your doctor may recommend over-the-counter medicine.  For mild or occasional indigestion, antacids, such as Tums, Gaviscon, Mylanta, or Maalox, may help. Your doctor also may recommend over-the-counter acid reducers, such as Pepcid AC, Tagamet HB, Zantac 75, or Prilosec. Read and follow all instructions on the label. If you use these medicines often, talk with your doctor. · Change your eating habits. ¨ It's best to eat several small meals instead of two or three large meals. ¨ After you eat, wait 2 to 3 hours before you lie down. ¨ Chocolate, mint, and alcohol can make GERD worse. ¨ Spicy foods, foods that have a lot of acid (like tomatoes and oranges), and coffee can make GERD symptoms worse in some people. If your symptoms are worse after you eat a certain food, you may want to stop eating that food to see if your symptoms get better. · Do not smoke or chew tobacco. Smoking can make GERD worse. If you need help quitting, talk to your doctor about stop-smoking programs and medicines. These can increase your chances of quitting for good. · If you have GERD symptoms at night, raise the head of your bed 6 to 8 inches by putting the frame on blocks or placing a foam wedge under the head of your mattress. (Adding extra pillows does not work.) · Do not wear tight clothing around your middle. · Lose weight if you need to. Losing just 5 to 10 pounds can help. When should you call for help? Call your doctor now or seek immediate medical care if: 
? · You have new or different belly pain. ? · Your stools are black and tarlike or have streaks of blood. ? Watch closely for changes in your health, and be sure to contact your doctor if: 
? · Your symptoms have not improved after 2 days. ? · Food seems to catch in your throat or chest.  
Where can you learn more? Go to http://maria elena-karol.info/. Enter J567 in the search box to learn more about \"Gastroesophageal Reflux Disease (GERD): Care Instructions. \" Current as of: May 12, 2017 Content Version: 11.4 © 0694-7529 [employer]. Care instructions adapted under license by [employer] (which disclaims liability or warranty for this information). If you have questions about a medical condition or this instruction, always ask your healthcare professional. [address] any warranty or liability for your use of this information. Introducing [facility]! Bonnie Escudero introduces [employer] patient portal. Now you can access parts of your medical record, email your doctor's office, and request medication refills online. 1. In your internet browser, go to [email or web address] 2. Click on the First Time User? Click Here link in the Sign In box. You will see the New Member Sign Up page. 3. Enter your [employer] Access Code exactly as it appears below. You will not need to use this code after youve completed the sign-up process. If you do not sign up before the expiration date, you must request a new code. · [employer] Access Code: [email or web address] Expires: 11/19/2017 11:28 AM 
 
4. Enter the last four digits of your Social Security Number (xxxx) and Date of Birth (mm/dd/yyyy) as indicated and click Submit. You will be taken to the next sign-up page. 5. Create a [employer] ID. This will be your [employer] login ID and cannot be changed, so think of one that is secure and easy to remember. 6. Create a [employer] password. You can change your password at any time. 7. Enter your Password Reset Question and Answer. This can be used at a later time if you forget your password. 8. Enter your e-mail address. You will receive e-mail notification when new information is available in [address] 9. Click Sign Up. You can now view and download portions of your medical record. 10. Click the Download Summary menu link to download a portable copy of your medical information.  
 
If you have questions, please visit the Frequently Asked Questions section of the [employer]. Remember, [employer] is NOT to be used for urgent needs. For medical emergencies, dial 911. Now available from your iPhone and Android! Please provide this summary of care documentation to your next provider. Your primary care clinician is listed as Gilda Fajardo. If you have any questions after today's visit, please call [phone].

## 2017-11-14 NOTE — PROGRESS NOTES
Chief Complaint   Patient presents with    Heartburn     x 4 days; mostly at night when sleeping     1. Have you been to the ER, urgent care clinic since your last visit? Yes [facility] and [facility] and urinary infection  Hospitalized since your last visit? No    2. Have you seen or consulted any other health care providers outside of the [address] [place] [address] since your last visit? Include any pap smears or colon screening.  No    Health Maintenance Due   Topic Date Due    Diabetic Foot Care  10/29/1976    Eye Exam  10/29/1976    Stool testing for trace blood  10/29/2016

## 2017-11-15 ENCOUNTER — PATIENT OUTREACH (OUTPATIENT)
Dept: FAMILY MEDICINE CLINIC | Age: 52
End: 2017-11-15

## 2017-11-15 NOTE — PROGRESS NOTES
NN Follow-Up          Follow up call placed to patient. Patient discharged from [facility] on 10/31/17 for Mild persistent asthma with acute exacerbation   Panel Manager contacted the patient by telephone to perform post ED discharge follow up. No answer, message left requesting return call. Patient has follow up with PCP.

## 2017-11-17 DIAGNOSIS — E78.5 HYPERLIPIDEMIA, UNSPECIFIED HYPERLIPIDEMIA TYPE: ICD-10-CM

## 2017-11-30 ENCOUNTER — PATIENT OUTREACH (OUTPATIENT)
Dept: FAMILY MEDICINE CLINIC | Age: 52
End: 2017-11-30

## 2017-11-30 NOTE — PROGRESS NOTES
Panel Manager will resolve  10/31/17 PATY. Patient has been contacted and has followed up with PCP. No sign that patient has return to ED/Hospital in the last 30 days.

## 2018-02-01 ENCOUNTER — TELEPHONE (OUTPATIENT)
Dept: FAMILY MEDICINE CLINIC | Age: 53
End: 2018-02-01

## 2018-02-01 DIAGNOSIS — E11.9 TYPE 2 DIABETES MELLITUS WITHOUT COMPLICATION, WITHOUT LONG-TERM CURRENT USE OF INSULIN (HCC): ICD-10-CM

## 2018-02-01 NOTE — TELEPHONE ENCOUNTER
Refill request from [address] for Metformin  mg twice daily. Original refill under Dr. Desiree Mosley 8/22/17, but prescription on pt's medication list is for once daily dosing.   Ldm

## 2018-02-06 DIAGNOSIS — E11.9 TYPE 2 DIABETES MELLITUS WITHOUT COMPLICATION, WITHOUT LONG-TERM CURRENT USE OF INSULIN (HCC): ICD-10-CM

## 2018-02-06 RX ORDER — METFORMIN HYDROCHLORIDE 500 MG/1
500 TABLET, EXTENDED RELEASE ORAL DAILY
Qty: 30 TAB | Refills: 11 | Status: SHIPPED | OUTPATIENT
Start: 2018-02-06 | End: 2018-02-19 | Stop reason: SDUPTHER

## 2018-02-06 NOTE — TELEPHONE ENCOUNTER
He had been seeing Dr. Finn Hardy for his diabetes. He needs to schedule a follow up visit with her and have her refill his metformin.

## 2018-02-06 NOTE — TELEPHONE ENCOUNTER
Pt called stating he has appointment with Dr. Seamus Degroot on 2/19/18, is completely out of his diabetes medication and has been waiting for refill to be approved. Pt advised pharmacy sent refill to Dr. Diana Marquez in [place], but will resend to Dr. Seamus Degroot. Pt agrees to plan.   Sarojm

## 2018-02-13 LAB
ALBUMIN SERPL-MCNC: 4.1 G/DL (ref 3.5–5.5)
ALBUMIN/CREAT UR: 3.6 MG/G CREAT (ref 0–30)
ALBUMIN/GLOB SERPL: 1.6 {RATIO} (ref 1.2–2.2)
ALP SERPL-CCNC: 78 IU/L (ref 39–117)
ALT SERPL-CCNC: 30 IU/L (ref 0–44)
AST SERPL-CCNC: 21 IU/L (ref 0–40)
BILIRUB SERPL-MCNC: 1.2 MG/DL (ref 0–1.2)
BUN SERPL-MCNC: 11 MG/DL (ref 6–24)
BUN/CREAT SERPL: 8 (ref 9–20)
CALCIUM SERPL-MCNC: 8.7 MG/DL (ref 8.7–10.2)
CHLORIDE SERPL-SCNC: 99 MMOL/L (ref 96–106)
CHOLEST SERPL-MCNC: 163 MG/DL (ref 100–199)
CO2 SERPL-SCNC: 22 MMOL/L (ref 18–29)
CREAT SERPL-MCNC: 1.4 MG/DL (ref 0.76–1.27)
CREAT UR-MCNC: 106.9 MG/DL
EST. AVERAGE GLUCOSE BLD GHB EST-MCNC: 128 MG/DL
GFR SERPLBLD CREATININE-BSD FMLA CKD-EPI: 58 ML/MIN/1.73
GFR SERPLBLD CREATININE-BSD FMLA CKD-EPI: 67 ML/MIN/1.73
GLOBULIN SER CALC-MCNC: 2.6 G/DL (ref 1.5–4.5)
GLUCOSE SERPL-MCNC: 106 MG/DL (ref 65–99)
HBA1C MFR BLD: 6.1 % (ref 4.8–5.6)
HDLC SERPL-MCNC: 33 MG/DL
INTERPRETATION, 910389: NORMAL
INTERPRETATION: NORMAL
LDLC SERPL CALC-MCNC: 106 MG/DL (ref 0–99)
Lab: NORMAL
MICROALBUMIN UR-MCNC: 3.8 UG/ML
PDF IMAGE, 910387: NORMAL
POTASSIUM SERPL-SCNC: 4.3 MMOL/L (ref 3.5–5.2)
PROT SERPL-MCNC: 6.7 G/DL (ref 6–8.5)
SODIUM SERPL-SCNC: 139 MMOL/L (ref 134–144)
TRIGL SERPL-MCNC: 122 MG/DL (ref 0–149)
VLDLC SERPL CALC-MCNC: 24 MG/DL (ref 5–40)

## 2018-02-19 ENCOUNTER — OFFICE VISIT (OUTPATIENT)
Dept: ENDOCRINOLOGY | Age: 53
End: 2018-02-19

## 2018-02-19 VITALS
WEIGHT: 187.8 LBS | SYSTOLIC BLOOD PRESSURE: 124 MMHG | OXYGEN SATURATION: 96 % | BODY MASS INDEX: 32.06 KG/M2 | RESPIRATION RATE: 14 BRPM | HEART RATE: 91 BPM | HEIGHT: 64 IN | DIASTOLIC BLOOD PRESSURE: 85 MMHG | TEMPERATURE: 96.3 F

## 2018-02-19 DIAGNOSIS — E11.65 TYPE 2 DIABETES MELLITUS WITH HYPERGLYCEMIA, WITHOUT LONG-TERM CURRENT USE OF INSULIN (HCC): Primary | ICD-10-CM

## 2018-02-19 DIAGNOSIS — E11.9 TYPE 2 DIABETES MELLITUS WITHOUT COMPLICATION, WITHOUT LONG-TERM CURRENT USE OF INSULIN (HCC): ICD-10-CM

## 2018-02-19 DIAGNOSIS — E78.00 HYPERCHOLESTEROLEMIA: ICD-10-CM

## 2018-02-19 RX ORDER — MONTELUKAST SODIUM 10 MG/1
10 TABLET ORAL DAILY
COMMUNITY
End: 2018-03-28 | Stop reason: SDUPTHER

## 2018-02-19 RX ORDER — METFORMIN HYDROCHLORIDE 500 MG/1
500 TABLET, EXTENDED RELEASE ORAL DAILY
Qty: 90 TAB | Refills: 3 | Status: SHIPPED | OUTPATIENT
Start: 2018-02-19 | End: 2018-03-28 | Stop reason: SDUPTHER

## 2018-02-19 NOTE — PROGRESS NOTES
Brett Arias is a 47 y.o. male here for   Chief Complaint   Patient presents with    Diabetes     Pt not feeling energic x2 months    Functional glucose monitor and record keeping system? - yes  Eye exam within last year? - [facility] in [address] exam within last year? - on file    1. Have you been to the ER, urgent care clinic since your last visit? Hospitalized since your last visit? -no    2. Have you seen or consulted any other health care providers outside of the [address] [place] [address] since your last visit?   Include any pap smears or colon screening[facility]       Lab Results   Component Value Date/Time    Hemoglobin A1c 6.1 (H) 02/12/2018 09:28 AM    Hemoglobin A1c (POC) 5.9 08/21/2017 12:33 PM    Hemoglobin A1c, External 7.4 09/23/2016       Wt Readings from Last 3 Encounters:   11/14/17 180 lb (81.6 kg)   11/03/17 180 lb (81.6 kg)   10/29/17 180 lb (81.6 kg)     Temp Readings from Last 3 Encounters:   11/14/17 97.3 °F (36.3 °C) (Oral)   11/03/17 97 °F (36.1 °C) (Oral)   10/29/17 97.8 °F (36.6 °C)     BP Readings from Last 3 Encounters:   11/14/17 128/83   11/03/17 120/88   10/29/17 (!) 127/96     Pulse Readings from Last 3 Encounters:   11/14/17 83   11/03/17 91   10/29/17 92

## 2018-02-19 NOTE — PROGRESS NOTES
[facility]               Shelia George MD        [address] 78 [phone] Fax [phone]               Patient Information  Date:2/19/2018  Name : North Jorge 47 y.o.     YOB: 1965         Referred by: Latasha Simons MD         Chief Complaint   Patient presents with    Diabetes       History of Present Illness: North Jorge is a 47 y.o. male here for fu  of  Type 2 Diabetes Mellitus. Type 2 Diabetes was diagnosed in 2016 . End organ effects of diabetes: none. Cardiovascular risk factors: dyslipidemia, diabetes mellitus, male gender   Monitoring frequency:1 /day and readings run < 120  Hypoglycemia: no    Taking metformin consistently  Complains of sleeping a lot but still waking up tired, daytime sleepiness. He has gradually been gaining weight. His job has changed which is mostly sedentary. Wt Readings from Last 3 Encounters:   02/19/18 187 lb 12.8 oz (85.2 kg)   11/14/17 180 lb (81.6 kg)   11/03/17 180 lb (81.6 kg)       BP Readings from Last 3 Encounters:   02/19/18 124/85   11/14/17 128/83   11/03/17 120/88           Past Medical History:   Diagnosis Date    Allergic rhinitis 2/21/2014    Asthma     Diabetes ([address]     History of seasonal allergies     Spots in front of the eye      Current Outpatient Prescriptions   Medication Sig    montelukast (SINGULAIR) 10 mg tablet Take 10 mg by mouth daily.  metFORMIN ER (GLUCOPHAGE XR) 500 mg tablet Take 1 Tab by mouth daily. For DM2    raNITIdine (ZANTAC) 300 mg tablet Take 1 Tab by mouth daily.  benzonatate (TESSALON) 200 mg capsule Take 1 Cap by mouth three (3) times daily as needed for Cough.  fluticasone (FLONASE) 50 mcg/actuation nasal spray USE 2 SPRAYS IN BOTH NOSTRILS EVERY DAY    beclomethasone (QVAR) 80 mcg/actuation aero Take 2 Puffs by inhalation two (2) times a day.  atorvastatin (LIPITOR) 40 mg tablet Take 1 Tab by mouth daily.     SOOTHE XP 1-4.5 % drop ophthalmic solution INT ONE GTT IN OU QID PRN    albuterol (VENTOLIN HFA) 90 mcg/actuation inhaler INHALE 1 TO 2 PUFFS BY MOUTH EVERY 4 HOURS AS NEEDED FOR WHEEZING    albuterol (PROVENTIL VENTOLIN) 2.5 mg /3 mL (0.083 %) nebulizer solution 3 mL by Nebulization route every four (4) hours as needed for Wheezing. No current facility-administered medications for this visit. Allergies   Allergen Reactions    Pcn [Penicillins] Other (comments)     Father told him as child he was allergic to PCN. He does not know if he has every been on a cephalosporin that he has tolerated         Review of Systems:  -   - Cardiovascular: no chest pain ,no palpitations  - Respiratory: no cough no shortness of breath  - Gastrointestinal: no dysphagia no  abdominal pain  - Musculoskeletal: no joint pains no  weakness  - Integumentary: no rashes  - Neurological: no numbness, tingling, no  headaches  - Psychiatric: no depression no  anxiety  - Endocrine: no heat or cold intolerance    Physical Examination:   Blood pressure 124/85, pulse 91, temperature 96.3 °F (35.7 °C), temperature source Oral, resp. rate 14, height 5' 4\" (1.626 m), weight 187 lb 12.8 oz (85.2 kg), SpO2 96 %. Estimated body mass index is 32.24 kg/(m^2) as calculated from the following:    Height as of this encounter: 5' 4\" (1.626 m). -   Weight as of this encounter: 187 lb 12.8 oz (85.2 kg). - General: pleasant, no distress, good eye contact  - HEENT: no pallor, no periorbital edema, EOMI  - Neck: supple, no thyromegaly, no nodules  - Cardiovascular: regular, normal rate, normal S1 and S2  - Respiratory: clear to auscultation bilaterally  - Gastrointestinal: soft, nontender, nondistended,  BS +  - Musculoskeletal: no proximal muscle weakness in upper or lower extremities  -   - Neurological:alert and oriented  - Psychiatric: normal mood and affect  - Skin: color, texture, turgor normal.       Data Reviewed:     [] Glucose records reviewed.   [] See glucose records for details (to be scanned). [] A1C  [] Reviewed labs    Lab Results   Component Value Date/Time    Hemoglobin A1c 6.1 (H) 02/12/2018 09:28 AM    Hemoglobin A1c 6.0 (H) 02/17/2017 08:42 AM    Hemoglobin A1c 7.6 (H) 09/30/2016 10:10 AM    Hemoglobin A1c, External 7.4 09/23/2016    Glucose 106 (H) 02/12/2018 09:28 AM    Glucose (POC) 124 (H) 12/31/2013 10:32 PM    Microalb/Creat ratio (ug/mg creat.) 3.6 02/12/2018 09:28 AM    LDL, calculated 106 (H) 02/12/2018 09:28 AM    Creatinine (POC) 1.3 12/31/2013 10:32 PM    Creatinine 1.40 (H) 02/12/2018 09:28 AM      Lab Results   Component Value Date/Time    Cholesterol, total 163 02/12/2018 09:28 AM    HDL Cholesterol 33 (L) 02/12/2018 09:28 AM    LDL, calculated 106 (H) 02/12/2018 09:28 AM    Triglyceride 122 02/12/2018 09:28 AM       Lab Results   Component Value Date/Time    ALT (SGPT) 30 02/12/2018 09:28 AM    AST (SGOT) 21 02/12/2018 09:28 AM    Alk. phosphatase 78 02/12/2018 09:28 AM    Bilirubin, direct 0.27 08/14/2017 09:08 AM    Bilirubin, total 1.2 02/12/2018 09:28 AM       Lab Results   Component Value Date/Time    GFR est AA 67 02/12/2018 09:28 AM    GFR est non-AA 58 (L) 02/12/2018 09:28 AM    Creatinine (POC) 1.3 12/31/2013 10:32 PM    Creatinine 1.40 (H) 02/12/2018 09:28 AM    BUN 11 02/12/2018 09:28 AM    BUN (POC) 13 12/31/2013 10:32 PM    Sodium (POC) 140 12/31/2013 10:32 PM    Sodium 139 02/12/2018 09:28 AM    Potassium 4.3 02/12/2018 09:28 AM    Potassium (POC) 3.2 (L) 12/31/2013 10:32 PM    Chloride (POC) 105 12/31/2013 10:32 PM    Chloride 99 02/12/2018 09:28 AM    CO2 22 02/12/2018 09:28 AM        Assessment/Plan:     1. Type 2 diabetes mellitus with hyperglycemia, without long-term current use of insulin ([facility]    2. Hypercholesterolemia        1.  Type 2 Diabetes Mellitus with no nephropathy,neuropathy,retinopathy  Lab Results   Component Value Date/Time    Hemoglobin A1c 6.1 (H) 02/12/2018 09:28 AM    Hemoglobin A1c (POC) 5.9 08/21/2017 12:33 PM    Hemoglobin A1c, External 7.4 09/23/2016     He is on Metformin   Controlled     Diabetic issues reviewed : glycemic goals ,  low carbohydrate diet, weight control , home glucose monitoring emphasized,      2. Hyperlipidemia : Statin    3 HTN     4.Obesity:Body mass index is 32.24 kg/(m^2). Discussed about the importance of exercise and carbohydrate portion control. 5.  Fatigue  Stressed importance of weight loss, he may have sleep apnea and low testosterone. Discussed the possibilities and wants to wait on sleep study. He will work on the weight loss      There are no Patient Instructions on file for this visit. Follow-up Disposition: Not on File    Thank you for allowing me to participate in the care of this patient.     Shelia George MD      Patient verbalized understanding

## 2018-03-28 ENCOUNTER — OFFICE VISIT (OUTPATIENT)
Dept: FAMILY MEDICINE CLINIC | Age: 53
End: 2018-03-28

## 2018-03-28 VITALS
RESPIRATION RATE: 18 BRPM | OXYGEN SATURATION: 98 % | HEART RATE: 85 BPM | DIASTOLIC BLOOD PRESSURE: 91 MMHG | WEIGHT: 184 LBS | BODY MASS INDEX: 31.41 KG/M2 | SYSTOLIC BLOOD PRESSURE: 133 MMHG | TEMPERATURE: 95.6 F | HEIGHT: 64 IN

## 2018-03-28 DIAGNOSIS — J45.40 MODERATE PERSISTENT ASTHMA WITHOUT COMPLICATION: ICD-10-CM

## 2018-03-28 DIAGNOSIS — E78.00 HYPERCHOLESTEROLEMIA: ICD-10-CM

## 2018-03-28 DIAGNOSIS — E11.9 TYPE 2 DIABETES MELLITUS WITHOUT COMPLICATION, WITHOUT LONG-TERM CURRENT USE OF INSULIN (HCC): ICD-10-CM

## 2018-03-28 DIAGNOSIS — K21.9 GASTROESOPHAGEAL REFLUX DISEASE, ESOPHAGITIS PRESENCE NOT SPECIFIED: ICD-10-CM

## 2018-03-28 DIAGNOSIS — E11.65 TYPE 2 DIABETES MELLITUS WITH HYPERGLYCEMIA, WITHOUT LONG-TERM CURRENT USE OF INSULIN (HCC): ICD-10-CM

## 2018-03-28 DIAGNOSIS — J45.41 MODERATE PERSISTENT ASTHMA WITH EXACERBATION: Primary | ICD-10-CM

## 2018-03-28 DIAGNOSIS — R05.9 COUGH: ICD-10-CM

## 2018-03-28 RX ORDER — PROMETHAZINE HYDROCHLORIDE AND CODEINE PHOSPHATE 6.25; 1 MG/5ML; MG/5ML
5 SOLUTION ORAL
Qty: 240 ML | Refills: 0 | Status: SHIPPED | OUTPATIENT
Start: 2018-03-28 | End: 2018-10-30

## 2018-03-28 RX ORDER — RANITIDINE 300 MG/1
300 TABLET ORAL DAILY
Qty: 30 TAB | Refills: 1 | Status: SHIPPED | OUTPATIENT
Start: 2018-03-28 | End: 2018-12-11

## 2018-03-28 RX ORDER — MONTELUKAST SODIUM 10 MG/1
10 TABLET ORAL DAILY
Qty: 90 TAB | Refills: 0 | Status: SHIPPED | OUTPATIENT
Start: 2018-03-28 | End: 2018-10-02 | Stop reason: SDUPTHER

## 2018-03-28 RX ORDER — FLUTICASONE PROPIONATE 50 MCG
SPRAY, SUSPENSION (ML) NASAL
Qty: 3 BOTTLE | Refills: 0 | Status: SHIPPED | OUTPATIENT
Start: 2018-03-28 | End: 2018-10-02 | Stop reason: SDUPTHER

## 2018-03-28 RX ORDER — PREDNISONE 20 MG/1
20 TABLET ORAL 3 TIMES DAILY
Qty: 15 TAB | Refills: 0 | Status: SHIPPED | OUTPATIENT
Start: 2018-03-28 | End: 2018-04-02

## 2018-03-28 RX ORDER — METFORMIN HYDROCHLORIDE 500 MG/1
500 TABLET, EXTENDED RELEASE ORAL DAILY
Qty: 90 TAB | Refills: 0 | Status: SHIPPED | OUTPATIENT
Start: 2018-03-28 | End: 2018-09-14 | Stop reason: SDUPTHER

## 2018-03-28 RX ORDER — ALBUTEROL SULFATE 90 UG/1
AEROSOL, METERED RESPIRATORY (INHALATION)
Qty: 1 INHALER | Refills: 2 | Status: SHIPPED | OUTPATIENT
Start: 2018-03-28 | End: 2018-10-02 | Stop reason: SDUPTHER

## 2018-03-28 NOTE — PROGRESS NOTES
HISTORY OF PRESENT ILLNESS  Magy Ott is a 47 y.o. male. HPI Comments: Magy Ott is here due to asthma symptoms for the past month. It is getting worse. I last saw him this past November for gastroesophageal reflux disease and cough. He was treated with Zantac, with some relief in his cough. Currently, he is having a nonproductive cough, wheezing and shortness of breath. He has had an upper respiratory infection, which started all of this. Medications include QVAR, which he is taking regularly, and prn albuterol, which he is needing about 3 times a day. He will be losing his insurance at the end of the months and requests refills on all of his medications. Asthma   The history is provided by the patient. Associated symptoms include shortness of breath. Pertinent negatives include no chest pain. Cough   Associated symptoms include shortness of breath and wheezing. Pertinent negatives include no chest pain, no chills, no eye redness, no ear pain and no sore throat. Review of Systems   Constitutional: Positive for malaise/fatigue. Negative for chills and fever. HENT: Positive for congestion. Negative for ear pain and sore throat. Mucous is creamy white in color. There is no sinus pain. Eyes: Negative for discharge and redness. Respiratory: Positive for cough, shortness of breath and wheezing. Negative for sputum production. Cardiovascular: Negative for chest pain. Musculoskeletal: Positive for back pain. Visit Vitals    BP (!) 133/91    Pulse 85    Temp 95.6 °F (35.3 °C) (Oral)    Resp 18    Ht 5' 4\" (1.626 m)    Wt 184 lb (83.5 kg)    SpO2 98%     L/min    BMI 31.58 kg/m2     BP Readings from Last 3 Encounters:   03/28/18 (!) 133/91   02/19/18 124/85   11/14/17 128/83     Physical Exam   Constitutional: He is oriented to person, place, and time. He appears well-developed and well-nourished. No distress. HENT:   Head: Normocephalic.    Right Ear: Tympanic membrane, external ear and ear canal normal.   Left Ear: Tympanic membrane, external ear and ear canal normal.   Nose: Nose normal. Right sinus exhibits no maxillary sinus tenderness and no frontal sinus tenderness. Left sinus exhibits no maxillary sinus tenderness and no frontal sinus tenderness. Mouth/Throat: Uvula is midline, oropharynx is clear and moist and mucous membranes are normal.   Eyes: Right eye exhibits no discharge. Left eye exhibits no discharge. Right conjunctiva is not injected. Left conjunctiva is not injected. Cardiovascular: Normal rate, regular rhythm and normal heart sounds. Exam reveals no gallop and no friction rub. No murmur heard. Pulmonary/Chest: Effort normal. No respiratory distress. He has wheezes. He has no rales. Lymphadenopathy:     He has no cervical adenopathy. Neurological: He is alert and oriented to person, place, and time. Skin: Skin is warm and dry. He is not diaphoretic. Nursing note and vitals reviewed. ASSESSMENT and PLAN    ICD-10-CM ICD-9-CM    1. Moderate persistent asthma with exacerbation J45.41 493.92 predniSONE (DELTASONE) 20 mg tablet   2. Cough R05 786.2 promethazine-codeine (PHENERGAN WITH CODEINE) 6.25-10 mg/5 mL syrup   3. Gastroesophageal reflux disease, esophagitis presence not specified K21.9 530.81 raNITIdine (ZANTAC) 300 mg tablet   4. Moderate persistent asthma without complication B62.29 434.97 umeclidinium (INCRUSE ELLIPTA) 62.5 mcg/actuation inhaler      REFERRAL TO PULMONARY DISEASE      beclomethasone (QVAR) 80 mcg/actuation aero      albuterol (VENTOLIN HFA) 90 mcg/actuation inhaler   5. Type 2 diabetes mellitus with hyperglycemia, without long-term current use of insulin ([facility]) E11.65 250.00 montelukast (SINGULAIR) 10 mg tablet     790.29    6. Type 2 diabetes mellitus without complication, without long-term current use of insulin ([facility]) E11.9 250.00 metFORMIN ER (GLUCOPHAGE XR) 500 mg tablet   7.  Hypercholesterolemia E78.00 272.0 montelukast (SINGULAIR) 10 mg tablet        Asthma exacerbation due to URI and untreated gastroesophageal reflux disease  Prednisone  Restart Zantac  Phenergan with codeine prn  Add Incruse Ellipta  Follow up with Pulmonary in one month  Refills per orders    Follow-up Disposition:  Return if symptoms worsen or fail to improve. Reviewed plan of care. Patient has provided input and agrees with goals.

## 2018-03-28 NOTE — PROGRESS NOTES
Chief Complaint   Patient presents with    Asthma     \"wheezing\"    Cough     x 3 weeks   Pt's insurance is ending 03/31/18. He would like refills for medications if possible    1. Have you been to the ER, urgent care clinic since your last visit? No  Hospitalized since your last visit? No    2. Have you seen or consulted any other health care providers outside of the [facility] since your last visit? Include any pap smears or colon screening.  No

## 2018-03-28 NOTE — MR AVS SNAPSHOT
[address] 
[phone] Patient: Iglesia Dominguez 
MRN: [id number] :1965 Visit Information Date & Time Provider Department Dept. Phone Encounter #  
 3/28/2018 11:15 AM Paige Aguilera MD [address] [id number] Follow-up Instructions Return if symptoms worsen or fail to improve. Your Appointments 2018 10:30 AM  
ROUTINE CARE with Wilian Delgado MD  
[facility] Caron Skinner) Appt Note: F/U routine 6 months DM  
 [address] [facility] [address]  
[phone]  
  
   
 [address] [address] Upcoming Health Maintenance Date Due FOBT Q 1 YEAR AGE 50-75 10/29/2016 EYE EXAM RETINAL OR DILATED Q1 2018 HEMOGLOBIN A1C Q6M 2018 FOOT EXAM Q1 2018 MICROALBUMIN Q1 2019 LIPID PANEL Q1 2019 DTaP/Tdap/Td series (2 - Td) 11/3/2027 Allergies as of 3/28/2018  Review Complete On: 3/28/2018 By: Paige Aguilera MD  
  
 Severity Noted Reaction Type Reactions Pcn [Penicillins]  10/11/2011    Other (comments) Father told him as child he was allergic to PCN. He does not know if he has every been on a cephalosporin that he has tolerated Current Immunizations  Reviewed on 2012 Name Date PPD 2012 Not reviewed this visit You Were Diagnosed With   
  
 Codes Comments Moderate persistent asthma with exacerbation    -  Primary ICD-10-CM: J45.41 
ICD-9-CM: 493.92 Cough     ICD-10-CM: R05 ICD-9-CM: 786.2 Gastroesophageal reflux disease, esophagitis presence not specified     ICD-10-CM: K21.9 ICD-9-CM: 530.81 Moderate persistent asthma without complication     WJW-: J45.40 ICD-9-CM: 493.90 Vitals BP Pulse Temp Resp Height(growth percentile) Weight(growth percentile) (!) 133/91 85 95.6 °F (35.3 °C) (Oral) 18 5' 4\" (1.626 m) 184 lb (83.5 kg) SpO2 PF BMI Smoking Status 98% 300 L/min 31.58 kg/m2 Never Smoker Vitals History BMI and BSA Data Body Mass Index Body Surface Area 31.58 kg/m 2 1.94 m 2 Preferred Pharmacy Pharmacy Name Phone [facility] [address] [place] AT [facility] [phone] Your Updated Medication List  
  
   
This list is accurate as of 3/28/18  1:05 PM.  Always use your most recent med list.  
  
  
  
  
 * albuterol 90 mcg/actuation inhaler Commonly known as:  VENTOLIN HFA INHALE 1 TO 2 PUFFS BY MOUTH EVERY 4 HOURS AS NEEDED FOR WHEEZING  
  
 * albuterol 2.5 mg /3 mL (0.083 %) nebulizer solution Commonly known as:  PROVENTIL VENTOLIN  
3 mL by Nebulization route every four (4) hours as needed for Wheezing. beclomethasone 80 mcg/actuation [employer] Commonly known as:  QVAR Take 2 Puffs by inhalation two (2) times a day. fluticasone 50 mcg/actuation nasal spray Commonly known as:  FLONASE  
USE 2 SPRAYS IN BOTH NOSTRILS EVERY DAY  
  
 metFORMIN  mg tablet Commonly known as:  GLUCOPHAGE XR Take 1 Tab by mouth daily. For DM2  
  
 montelukast 10 mg tablet Commonly known as:  SINGULAIR Take 10 mg by mouth daily. predniSONE 20 mg tablet Commonly known as:  Alex Curd Take 1 Tab by mouth three (3) times daily for 5 days. promethazine-codeine 6.25-10 mg/5 mL syrup Commonly known as:  PHENERGAN with CODEINE Take 5 mL by mouth every six (6) hours as needed for Cough. Max Daily Amount: 20 mL. raNITIdine 300 mg tablet Commonly known as:  ZANTAC Take 1 Tab by mouth daily. SOOTHE XP 1-4.5 % Drop ophthalmic solution Generic drug:  light mineral oil-mineral oil INT ONE GTT IN OU QID PRN  
  
 umeclidinium 62.5 mcg/actuation inhaler Commonly known as:  INCRUSE ELLIPTA Take 1 Puff by inhalation daily. * Notice: This list has 2 medication(s) that are the same as other medications prescribed for you. Read the directions carefully, and ask your doctor or other care provider to review them with you. Prescriptions Printed Refills  
 promethazine-codeine (PHENERGAN WITH CODEINE) 6.25-10 mg/5 mL syrup 0 Sig: Take 5 mL by mouth every six (6) hours as needed for Cough. Max Daily Amount: 20 mL. Class: Print Route: Oral  
  
Prescriptions Sent to Pharmacy Refills  
 umeclidinium (INCRUSE ELLIPTA) 62.5 mcg/actuation inhaler 1 Sig: Take 1 Puff by inhalation daily. Class: Normal  
 Pharmacy: [address] Ph #: [phone] Route: Inhalation  
 predniSONE (DELTASONE) 20 mg tablet 0 Sig: Take 1 Tab by mouth three (3) times daily for 5 days. Class: Normal  
 Pharmacy: [address] Ph #: [phone] Route: Oral  
 raNITIdine (ZANTAC) 300 mg tablet 1 Sig: Take 1 Tab by mouth daily. Class: Normal  
 Pharmacy: [address] Ph #: [phone] Route: Oral  
  
We Performed the Following REFERRAL TO PULMONARY DISEASE [IDW14 Custom] Comments:  
 Asthma follow up; Ana Del [facility] Follow-up Instructions Return if symptoms worsen or fail to improve. Referral Information Referral ID Referred By Referred To  
  
 [id number] Michel Aguillon Not Available Visits Status Start Date End Date 1 New Request 3/28/18 3/28/19 If your referral has a status of pending review or denied, additional information will be sent to support the outcome of this decision. Introducing [facility]! Vashti Correia introduces [employer] patient portal. Now you can access parts of your medical record, email your doctor's office, and request medication refills online. 1. In your internet browser, go to [email or web address] 2. Click on the First Time User? Click Here link in the Sign In box. You will see the New Member Sign Up page. 3. Enter your [employer]as Access Code exactly as it appears below. You will not need to use this code after youve completed the sign-up process. If you do not sign up before the expiration date, you must request a new code. · [employer] Access Code: [place] Expires: 6/26/2018 11:22 AM 
 
4. Enter the last four digits of your Social Security Number (xxxx) and Date of Birth (mm/dd/yyyy) as indicated and click Submit. You will be taken to the next sign-up page. 5. Create a SocialPandas ID. This will be your SocialPandas login ID and cannot be changed, so think of one that is secure and easy to remember. 6. Create a [employer] password. You can change your password at any time. 7. Enter your Password Reset Question and Answer. This can be used at a later time if you forget your password. 8. Enter your e-mail address. You will receive e-mail notification when new information is available in [address] 9. Click Sign Up. You can now view and download portions of your medical record. 10. Click the Download Summary menu link to download a portable copy of your medical information. If you have questions, please visit the Frequently Asked Questions section of the [employer] website. Remember, [employer] is NOT to be used for urgent needs. For medical emergencies, dial 911. Now available from your iPhone and Android! Please provide this summary of care documentation to your next provider. Your primary care clinician is listed as Tressa Starks. If you have any questions after today's visit, please call [phone].

## 2018-05-01 ENCOUNTER — TELEPHONE (OUTPATIENT)
Dept: ENDOCRINOLOGY | Age: 53
End: 2018-05-01

## 2018-05-01 NOTE — TELEPHONE ENCOUNTER
Cj, Nurse, [facility],is following up on request for A1C of 2017, needs the last reading of 2017, and if seen by one of the physicians, need last BP reading of 2017. Spoke to [place] and faxed request Becka Sensing Friday 04/27/18.    There 3 additional pts associated with this request     Fax # [phone]     Best Contact # [phone]

## 2018-09-08 LAB
ALBUMIN SERPL-MCNC: 4 G/DL (ref 3.5–5.5)
ALBUMIN/CREAT UR: <4.5 MG/G CREAT (ref 0–30)
ALBUMIN/GLOB SERPL: 1.4 {RATIO} (ref 1.2–2.2)
ALP SERPL-CCNC: 69 IU/L (ref 39–117)
ALT SERPL-CCNC: 28 IU/L (ref 0–44)
AST SERPL-CCNC: 21 IU/L (ref 0–40)
BILIRUB SERPL-MCNC: 1 MG/DL (ref 0–1.2)
BUN SERPL-MCNC: 9 MG/DL (ref 6–24)
BUN/CREAT SERPL: 8 (ref 9–20)
CALCIUM SERPL-MCNC: 8.9 MG/DL (ref 8.7–10.2)
CHLORIDE SERPL-SCNC: 101 MMOL/L (ref 96–106)
CHOLEST SERPL-MCNC: 239 MG/DL (ref 100–199)
CO2 SERPL-SCNC: 21 MMOL/L (ref 20–29)
CREAT SERPL-MCNC: 1.17 MG/DL (ref 0.76–1.27)
CREAT UR-MCNC: 66 MG/DL
EST. AVERAGE GLUCOSE BLD GHB EST-MCNC: 131 MG/DL
GLOBULIN SER CALC-MCNC: 2.8 G/DL (ref 1.5–4.5)
GLUCOSE SERPL-MCNC: 96 MG/DL (ref 65–99)
HBA1C MFR BLD: 6.2 % (ref 4.8–5.6)
HDLC SERPL-MCNC: 36 MG/DL
INTERPRETATION, 910389: NORMAL
LDLC SERPL CALC-MCNC: 175 MG/DL (ref 0–99)
Lab: NORMAL
MICROALBUMIN UR-MCNC: <3 UG/ML
POTASSIUM SERPL-SCNC: 4.7 MMOL/L (ref 3.5–5.2)
PROT SERPL-MCNC: 6.8 G/DL (ref 6–8.5)
SODIUM SERPL-SCNC: 139 MMOL/L (ref 134–144)
TRIGL SERPL-MCNC: 142 MG/DL (ref 0–149)
VLDLC SERPL CALC-MCNC: 28 MG/DL (ref 5–40)

## 2018-09-14 ENCOUNTER — OFFICE VISIT (OUTPATIENT)
Dept: ENDOCRINOLOGY | Age: 53
End: 2018-09-14

## 2018-09-14 VITALS
WEIGHT: 186.3 LBS | OXYGEN SATURATION: 97 % | TEMPERATURE: 96.7 F | BODY MASS INDEX: 31.8 KG/M2 | DIASTOLIC BLOOD PRESSURE: 97 MMHG | HEIGHT: 64 IN | RESPIRATION RATE: 16 BRPM | SYSTOLIC BLOOD PRESSURE: 142 MMHG | HEART RATE: 83 BPM

## 2018-09-14 DIAGNOSIS — E11.65 TYPE 2 DIABETES MELLITUS WITH HYPERGLYCEMIA, WITHOUT LONG-TERM CURRENT USE OF INSULIN (HCC): Primary | ICD-10-CM

## 2018-09-14 DIAGNOSIS — E11.9 TYPE 2 DIABETES MELLITUS WITHOUT COMPLICATION, WITHOUT LONG-TERM CURRENT USE OF INSULIN (HCC): ICD-10-CM

## 2018-09-14 DIAGNOSIS — E78.00 HYPERCHOLESTEROLEMIA: ICD-10-CM

## 2018-09-14 RX ORDER — ROSUVASTATIN CALCIUM 10 MG/1
10 TABLET, COATED ORAL
Qty: 90 TAB | Refills: 3 | Status: SHIPPED | OUTPATIENT
Start: 2018-09-14 | End: 2019-06-04 | Stop reason: SDUPTHER

## 2018-09-14 RX ORDER — MULTIVITAMIN
2 TABLET ORAL 2 TIMES DAILY
COMMUNITY
End: 2018-10-30

## 2018-09-14 RX ORDER — METFORMIN HYDROCHLORIDE 500 MG/1
500 TABLET, EXTENDED RELEASE ORAL DAILY
Qty: 90 TAB | Refills: 3 | Status: SHIPPED | OUTPATIENT
Start: 2018-09-14 | End: 2019-06-04 | Stop reason: SDUPTHER

## 2018-09-14 NOTE — MR AVS SNAPSHOT
[address] [id number] 
[phone] Patient: Aric Prince 
MRN: [id number] :1965 Visit Information Date & Time Provider Department Dept. Phone Encounter #  
 2018  2:00 PM Jeanine Velasquez MD [facility] [phone] [id number] Follow-up Instructions Return in about 4 months (around 2019). Upcoming Health Maintenance Date Due FOBT Q 1 YEAR AGE 50-75 10/29/2016 EYE EXAM RETINAL OR DILATED Q1 2018 Influenza Age 5 to Adult 2018 FOOT EXAM Q1 2018 HEMOGLOBIN A1C Q6M 3/7/2019 MICROALBUMIN Q1 2019 LIPID PANEL Q1 2019 DTaP/Tdap/Td series (2 - Td) 11/3/2027 Allergies as of 2018  Review Complete On: 2018 By: Jeanine Velasquez MD  
  
 Severity Noted Reaction Type Reactions Pcn [Penicillins]  10/11/2011    Other (comments) Father told him as child he was allergic to PCN. He does not know if he has every been on a cephalosporin that he has tolerated Current Immunizations  Reviewed on 2012 Name Date PPD 2012 Not reviewed this visit Vitals BP Pulse Temp Resp Height(growth percentile) Weight(growth percentile) (!) 142/97 (BP 1 Location: Left arm, BP Patient Position: Sitting) 83 96.7 °F (35.9 °C) (Oral) 16 5' 4\" (1.626 m) 186 lb 4.8 oz (84.5 kg) SpO2 BMI Smoking Status 97% 31.98 kg/m2 Never Smoker Vitals History BMI and BSA Data Body Mass Index Body Surface Area 31.98 kg/m 2 1.95 m 2 Preferred Pharmacy Pharmacy Name Phone [facility] [address] [place] AT [facility] [phone] Your Updated Medication List  
  
   
This list is accurate as of 18  3:22 PM.  Always use your most recent med list.  
  
  
  
  
 * albuterol 2.5 mg /3 mL (0.083 %) nebulizer solution Commonly known as:  PROVENTIL VENTOLIN  
3 mL by Nebulization route every four (4) hours as needed for Wheezing. * albuterol 90 mcg/actuation inhaler Commonly known as:  VENTOLIN HFA INHALE 1 TO 2 PUFFS BY MOUTH EVERY 4 HOURS AS NEEDED FOR WHEEZING  
  
 beclomethasone 80 mcg/actuation [employer] Commonly known as:  QVAR Take 2 Puffs by inhalation two (2) times a day. calcium-cholecalciferol (D3) tablet Commonly known as:  CALTRATE 600+D Take 2 Tabs by mouth two (2) times a day. fluticasone 50 mcg/actuation nasal spray Commonly known as:  FLONASE  
USE 2 SPRAYS IN BOTH NOSTRILS EVERY DAY  
  
 metFORMIN  mg tablet Commonly known as:  GLUCOPHAGE XR Take 1 Tab by mouth daily. For DM2  
  
 montelukast 10 mg tablet Commonly known as:  SINGULAIR Take 1 Tab by mouth daily. promethazine-codeine 6.25-10 mg/5 mL syrup Commonly known as:  PHENERGAN with CODEINE Take 5 mL by mouth every six (6) hours as needed for Cough. Max Daily Amount: 20 mL. raNITIdine 300 mg tablet Commonly known as:  ZANTAC Take 1 Tab by mouth daily. SOOTHE XP 1-4.5 % Drop ophthalmic solution Generic drug:  light mineral oil-mineral oil INT ONE GTT IN OU QID PRN  
  
 umeclidinium 62.5 mcg/actuation inhaler Commonly known as:  INCRUSE ELLIPTA Take 1 Puff by inhalation daily. * Notice: This list has 2 medication(s) that are the same as other medications prescribed for you. Read the directions carefully, and ask your doctor or other care provider to review them with you. Follow-up Instructions Return in about 4 months (around 1/14/2019). Introducing [facility] & HEALTH SERVICES! Dylan Casey introduces [employer] patient portal. Now you can access parts of your medical record, email your doctor's office, and request medication refills online. 1. In your internet browser, go to [email or web address] 2. Click on the First Time User? Click Here link in the Sign In box. You will see the New Member Sign Up page. 3. Enter your Thanx Access Code exactly as it appears below. You will not need to use this code after youve completed the sign-up process. If you do not sign up before the expiration date, you must request a new code. · Thanx Access Code: [email or web address] Expires: 12/13/2018  3:22 PM 
 
4. Enter the last four digits of your Social Security Number (xxxx) and Date of Birth (mm/dd/yyyy) as indicated and click Submit. You will be taken to the next sign-up page. 5. Create a Thanx ID. This will be your Thanx login ID and cannot be changed, so think of one that is secure and easy to remember. 6. Create a Thanx password. You can change your password at any time. 7. Enter your Password Reset Question and Answer. This can be used at a later time if you forget your password. 8. Enter your e-mail address. You will receive e-mail notification when new information is available in [address] 9. Click Sign Up. You can now view and download portions of your medical record. 10. Click the Download Summary menu link to download a portable copy of your medical information. If you have questions, please visit the Frequently Asked Questions section of the [employer] website. Remember, [employer]x is NOT to be used for urgent needs. For medical emergencies, dial 911. Now available from your iPhone and Android! Please provide this summary of care documentation to your next provider. Your primary care clinician is listed as Angelica Wright. If you have any questions after today's visit, please call [phone].

## 2018-09-14 NOTE — PROGRESS NOTES
Wes Sarabia is a 47 y.o. male here for   Chief Complaint   Patient presents with    Diabetes     Still having problems sleeping    Functional glucose monitor and record keeping system? - sometimes  Eye exam within last year? - not yet  Foot exam within last year? - on file    1. Have you been to the ER, urgent care clinic since your last visit? Hospitalized since your last visit? -no    2. Have you seen or consulted any other health care providers outside of the [address] [place] [address] since your last visit?   Include any pap smears or colon screening.-no    Order placed for pt per verbal order with read back from Dr. Sanjeev Daniels 09/14/18

## 2018-09-14 NOTE — PROGRESS NOTES
[facility]               Nalini Patel MD        [address] 78 [phone] Fax [phone]               Patient Information  Date:9/15/2018  Name : Pietro Hummel 47 y.o.     YOB: 1965         Referred by: Fabi Drummond MD         Chief Complaint   Patient presents with    Diabetes       History of Present Illness: Pietro Hummel is a 47 y.o. male here for fu  of  Type 2 Diabetes Mellitus. Type 2 Diabetes was diagnosed in 2016 . End organ effects of diabetes: none. Cardiovascular risk factors: dyslipidemia, diabetes mellitus, male gender   No weight loss  No regular exercise    Hypoglycemia: no    Taking metformin consistently    He has gradually been gaining weight. His job has changed which is mostly sedentary. Wt Readings from Last 3 Encounters:   09/14/18 186 lb 4.8 oz (84.5 kg)   03/28/18 184 lb (83.5 kg)   02/19/18 187 lb 12.8 oz (85.2 kg)       BP Readings from Last 3 Encounters:   09/14/18 (!) 142/97   03/28/18 (!) 133/91   02/19/18 124/85           Past Medical History:   Diagnosis Date    Allergic rhinitis 2/21/2014    Asthma     Diabetes ([facility] [address]     History of seasonal allergies     Spots in front of the eye      Current Outpatient Prescriptions   Medication Sig    calcium-cholecalciferol, D3, (CALTRATE 600+D) tablet Take 2 Tabs by mouth two (2) times a day.  montelukast (SINGULAIR) 10 mg tablet Take 1 Tab by mouth daily. (Patient taking differently: Take 10 mg by mouth as needed.)    fluticasone (FLONASE) 50 mcg/actuation nasal spray USE 2 SPRAYS IN BOTH NOSTRILS EVERY DAY    beclomethasone (QVAR) 80 mcg/actuation aero Take 2 Puffs by inhalation two (2) times a day.     albuterol (VENTOLIN HFA) 90 mcg/actuation inhaler INHALE 1 TO 2 PUFFS BY MOUTH EVERY 4 HOURS AS NEEDED FOR WHEEZING    SOOTHE XP 1-4.5 % drop ophthalmic solution INT ONE GTT IN OU QID PRN    albuterol (PROVENTIL VENTOLIN) 2.5 mg /3 mL (0.083 %) nebulizer solution 3 mL by Nebulization route every four (4) hours as needed for Wheezing.  metFORMIN ER (GLUCOPHAGE XR) 500 mg tablet Take 1 Tab by mouth daily. For DM2    rosuvastatin (CRESTOR) 10 mg tablet Take 1 Tab by mouth nightly.  umeclidinium (INCRUSE ELLIPTA) 62.5 mcg/actuation inhaler Take 1 Puff by inhalation daily.  promethazine-codeine (PHENERGAN WITH CODEINE) 6.25-10 mg/5 mL syrup Take 5 mL by mouth every six (6) hours as needed for Cough. Max Daily Amount: 20 mL.  raNITIdine (ZANTAC) 300 mg tablet Take 1 Tab by mouth daily. (Patient taking differently: Take 300 mg by mouth as needed.)     No current facility-administered medications for this visit. Allergies   Allergen Reactions    Pcn [Penicillins] Other (comments)     Father told him as child he was allergic to PCN. He does not know if he has every been on a cephalosporin that he has tolerated         Review of Systems:  -   - Cardiovascular: no chest pain ,no palpitations  - Respiratory: no cough no shortness of breath  - Gastrointestinal: no dysphagia no  abdominal pain  - Musculoskeletal: no joint pains no  weakness  - Integumentary: no rashes  - Neurological: no numbness, tingling, no  headaches  - Psychiatric: no depression no  anxiety  - Endocrine: no heat or cold intolerance    Physical Examination:   Blood pressure (!) 142/97, pulse 83, temperature 96.7 °F (35.9 °C), temperature source Oral, resp. rate 16, height 5' 4\" (1.626 m), weight 186 lb 4.8 oz (84.5 kg), SpO2 97 %. Estimated body mass index is 31.98 kg/(m^2) as calculated from the following:    Height as of this encounter: 5' 4\" (1.626 m). -   Weight as of this encounter: 186 lb 4.8 oz (84.5 kg).   - General: pleasant, no distress, good eye contact  - HEENT: no pallor, no periorbital edema, EOMI  - Neck: supple, no thyromegaly, no nodules  - Cardiovascular: regular, normal rate, normal S1 and S2  - Respiratory: clear to auscultation bilaterally  - Gastrointestinal: soft, nontender, nondistended,  BS +  - Musculoskeletal: no proximal muscle weakness in upper or lower extremities  -   - Neurological:alert and oriented  - Psychiatric: normal mood and affect  - Skin: color, texture, turgor normal.       Data Reviewed:     [] Glucose records reviewed. [] See glucose records for details (to be scanned). [] A1C  [] Reviewed labs    Lab Results   Component Value Date/Time    Hemoglobin A1c 6.2 (H) 09/07/2018 09:51 AM    Hemoglobin A1c 6.1 (H) 02/12/2018 09:28 AM    Hemoglobin A1c 6.0 (H) 02/17/2017 08:42 AM    Hemoglobin A1c, External 7.4 09/23/2016    Glucose 96 09/07/2018 09:51 AM    Glucose (POC) 124 (H) 12/31/2013 10:32 PM    Microalb/Creat ratio (ug/mg creat.) <4.5 09/07/2018 09:51 AM    LDL, calculated 175 (H) 09/07/2018 09:51 AM    Creatinine (POC) 1.3 12/31/2013 10:32 PM    Creatinine 1.17 09/07/2018 09:51 AM      Lab Results   Component Value Date/Time    Cholesterol, total 239 (H) 09/07/2018 09:51 AM    HDL Cholesterol 36 (L) 09/07/2018 09:51 AM    LDL, calculated 175 (H) 09/07/2018 09:51 AM    Triglyceride 142 09/07/2018 09:51 AM       Lab Results   Component Value Date/Time    ALT (SGPT) 28 09/07/2018 09:51 AM    AST (SGOT) 21 09/07/2018 09:51 AM    Alk. phosphatase 69 09/07/2018 09:51 AM    Bilirubin, direct 0.27 08/14/2017 09:08 AM    Bilirubin, total 1.0 09/07/2018 09:51 AM       Lab Results   Component Value Date/Time    GFR est AA 83 09/07/2018 09:51 AM    GFR est non-AA 72 09/07/2018 09:51 AM    Creatinine (POC) 1.3 12/31/2013 10:32 PM    Creatinine 1.17 09/07/2018 09:51 AM    BUN 9 09/07/2018 09:51 AM    BUN (POC) 13 12/31/2013 10:32 PM    Sodium (POC) 140 12/31/2013 10:32 PM    Sodium 139 09/07/2018 09:51 AM    Potassium 4.7 09/07/2018 09:51 AM    Potassium (POC) 3.2 (L) 12/31/2013 10:32 PM    Chloride (POC) 105 12/31/2013 10:32 PM    Chloride 101 09/07/2018 09:51 AM    CO2 21 09/07/2018 09:51 AM        Assessment/Plan:     1. Type 2 diabetes mellitus with hyperglycemia, without long-term current use of insulin ([facility] [address]    2. Hypercholesterolemia        1. Type 2 Diabetes Mellitus   Lab Results   Component Value Date/Time    Hemoglobin A1c 6.2 (H) 09/07/2018 09:51 AM    Hemoglobin A1c (POC) 5.9 08/21/2017 12:33 PM    Hemoglobin A1c, External 7.4 09/23/2016     He is on Metformin   Controlled     Diabetic issues reviewed : glycemic goals ,  low carbohydrate diet, weight control , home glucose monitoring emphasized,      2. Hyperlipidemia : Statin   weight loss encouraged        4. Obesity:Body mass index is 31.98 kg/(m^2). Discussed about the importance of exercise and carbohydrate portion control. 5.  Fatigue  Stressed importance of weight loss, good sleep hygiene  Resume exercise      There are no Patient Instructions on file for this visit. Follow-up Disposition:  Return in about 4 months (around 1/14/2019). Thank you for allowing me to participate in the care of this patient.     Joan Mccormick MD      Patient verbalized understanding U

## 2018-10-02 ENCOUNTER — OFFICE VISIT (OUTPATIENT)
Dept: FAMILY MEDICINE CLINIC | Age: 53
End: 2018-10-02

## 2018-10-02 VITALS
RESPIRATION RATE: 18 BRPM | BODY MASS INDEX: 30.9 KG/M2 | DIASTOLIC BLOOD PRESSURE: 95 MMHG | TEMPERATURE: 97.8 F | WEIGHT: 181 LBS | HEART RATE: 74 BPM | HEIGHT: 64 IN | SYSTOLIC BLOOD PRESSURE: 141 MMHG

## 2018-10-02 DIAGNOSIS — E11.65 TYPE 2 DIABETES MELLITUS WITH HYPERGLYCEMIA, WITHOUT LONG-TERM CURRENT USE OF INSULIN (HCC): ICD-10-CM

## 2018-10-02 DIAGNOSIS — L29.9 ITCHING: Primary | ICD-10-CM

## 2018-10-02 DIAGNOSIS — E78.00 HYPERCHOLESTEROLEMIA: ICD-10-CM

## 2018-10-02 DIAGNOSIS — B35.6 TINEA CRURIS: ICD-10-CM

## 2018-10-02 DIAGNOSIS — J45.40 MODERATE PERSISTENT ASTHMA WITHOUT COMPLICATION: ICD-10-CM

## 2018-10-02 RX ORDER — FLUTICASONE PROPIONATE 50 MCG
SPRAY, SUSPENSION (ML) NASAL
Qty: 3 BOTTLE | Refills: 0 | Status: SHIPPED | OUTPATIENT
Start: 2018-10-02 | End: 2018-12-11 | Stop reason: SDUPTHER

## 2018-10-02 RX ORDER — CLOTRIMAZOLE AND BETAMETHASONE DIPROPIONATE 10; .5 MG/ML; MG/ML
LOTION TOPICAL 2 TIMES DAILY
Qty: 30 ML | Refills: 0 | Status: SHIPPED | OUTPATIENT
Start: 2018-10-02 | End: 2018-10-11 | Stop reason: SDUPTHER

## 2018-10-02 RX ORDER — LORATADINE 10 MG/1
10 TABLET ORAL DAILY
Qty: 30 TAB | Refills: 3 | Status: SHIPPED | OUTPATIENT
Start: 2018-10-02 | End: 2018-10-11 | Stop reason: SDUPTHER

## 2018-10-02 RX ORDER — MONTELUKAST SODIUM 10 MG/1
10 TABLET ORAL DAILY
Qty: 90 TAB | Refills: 0 | Status: SHIPPED | OUTPATIENT
Start: 2018-10-02 | End: 2019-03-18 | Stop reason: SDUPTHER

## 2018-10-02 RX ORDER — ALBUTEROL SULFATE 90 UG/1
AEROSOL, METERED RESPIRATORY (INHALATION)
Qty: 1 INHALER | Refills: 2 | Status: SHIPPED | OUTPATIENT
Start: 2018-10-02 | End: 2019-03-20 | Stop reason: SDUPTHER

## 2018-10-02 RX ORDER — ALBUTEROL SULFATE 0.83 MG/ML
2.5 SOLUTION RESPIRATORY (INHALATION)
Qty: 24 EACH | Refills: 2 | Status: SHIPPED | OUTPATIENT
Start: 2018-10-02 | End: 2020-01-10

## 2018-10-02 NOTE — PROGRESS NOTES
Chief Complaint   Patient presents with    Skin Problem     Itching all over     1. Have you been to the ER, urgent care clinic since your last visit? Hospitalized since your last visit? No      2. Have you seen or consulted any other health care providers outside of the [address] [place] [address] since your last visit? Include any pap smears or colon screening.  No

## 2018-10-02 NOTE — PATIENT INSTRUCTIONS
For the itching I would like the patient to take a daily Claritin, I have sent this to his pharmacy  I would also like for him to try using the topical antifungal steroid ointment that has been provided for him  Refills have been provided to him per his request today  I have reviewed his endocrinology visit and recent lab work.   If no significant improvement in 2 weeks I would like for him to return to the office for reevaluation

## 2018-10-02 NOTE — MR AVS SNAPSHOT
[address] 
[phone] Patient: Fran Nurse 
MRN: [id number] :1965 Visit Information Date & Time Provider Department Dept. Phone Encounter #  
 10/2/2018 11:15 AM Alma Ambrose 34 [id number] Your Appointments 2019 10:30 AM  
ROUTINE CARE with Jose Rojas MD  
[facility] Diabetes & Endocrinology [facility]) Appt Note: 4 mon fu  
 [address] [facility] [address]  
[phone]  
  
   
 [address] [place] [id number] Upcoming Health Maintenance Date Due Shingrix Vaccine Age 50> (1 of 2) 10/29/2016 FOBT Q 1 YEAR AGE 50-75 10/29/2016 EYE EXAM RETINAL OR DILATED Q1 2018 Influenza Age 5 to Adult 2018 FOOT EXAM Q1 2018 HEMOGLOBIN A1C Q6M 3/7/2019 MICROALBUMIN Q1 2019 LIPID PANEL Q1 2019 DTaP/Tdap/Td series (2 - Td) 11/3/2027 Allergies as of 10/2/2018  Review Complete On: 10/2/2018 By: Nakia Schrader LPN Severity Noted Reaction Type Reactions Pcn [Penicillins]  10/11/2011    Other (comments) Father told him as child he was allergic to PCN. He does not know if he has every been on a cephalosporin that he has tolerated Current Immunizations  Reviewed on 2012 Name Date PPD 2012 Not reviewed this visit You Were Diagnosed With   
  
 Codes Comments Itching    -  Primary ICD-10-CM: L29.9 ICD-9-CM: 698.9 Type 2 diabetes mellitus with hyperglycemia, without long-term current use of insulin (HCC)     ICD-10-CM: E11.65 ICD-9-CM: 250.00, 790.29 Hypercholesterolemia     ICD-10-CM: E78.00 ICD-9-CM: 272.0 Moderate persistent asthma without complication     QIA-40-QG: J45.40 ICD-9-CM: 493.90 Tinea cruris     ICD-10-CM: B35.6 ICD-9-CM: 110.3 Vitals BP Pulse Temp Resp Height(growth percentile) Weight(growth percentile) (!) 141/95 (BP 1 Location: Left arm, BP Patient Position: Sitting) 74 97.8 °F (36.6 °C) (Oral) 18 5' 4\" (1.626 m) 181 lb (82.1 kg) BMI Smoking Status 31.07 kg/m2 Never Smoker Vitals History BMI and BSA Data Body Mass Index Body Surface Area 31.07 kg/m 2 1.93 m 2 Preferred Pharmacy Pharmacy Name Phone [facility] [address] AT [facility] [phone] Your Updated Medication List  
  
   
This list is accurate as of 10/2/18 11:55 AM.  Always use your most recent med list.  
  
  
  
  
 * albuterol 2.5 mg /3 mL (0.083 %) nebulizer solution Commonly known as:  PROVENTIL VENTOLIN  
3 mL by Nebulization route every four (4) hours as needed for Wheezing. * albuterol 90 mcg/actuation inhaler Commonly known as:  VENTOLIN HFA INHALE 1 TO 2 PUFFS BY MOUTH EVERY 4 HOURS AS NEEDED FOR WHEEZING  
  
 beclomethasone 80 mcg/actuation Aero Commonly known as:  QVAR Take 2 Puffs by inhalation two (2) times a day. calcium-cholecalciferol (D3) tablet Commonly known as:  CALTRATE 600+D Take 2 Tabs by mouth two (2) times a day. clotrimazole-betamethasone 1-0.05 % lotion Commonly known as:  Glendell Bud Apply  to affected area two (2) times a day. fluticasone 50 mcg/actuation nasal spray Commonly known as:  FLONASE  
USE 2 SPRAYS IN BOTH NOSTRILS EVERY DAY  
  
 loratadine 10 mg tablet Commonly known as:  Pooja Commons Take 1 Tab by mouth daily. metFORMIN  mg tablet Commonly known as:  GLUCOPHAGE XR Take 1 Tab by mouth daily. For DM2  
  
 montelukast 10 mg tablet Commonly known as:  SINGULAIR Take 1 Tab by mouth daily. promethazine-codeine 6.25-10 mg/5 mL syrup Commonly known as:  PHENERGAN with CODEINE Take 5 mL by mouth every six (6) hours as needed for Cough.  Max Daily Amount: 20 mL. raNITIdine 300 mg Tab Commonly known as:  ZANTAC Take 1 Tab by mouth daily. rosuvastatin 10 mg tablet Commonly known as:  CRESTOR Take 1 Tab by mouth nightly. SOOTHE XP 1-4.5 % Drop ophthalmic solution Generic drug:  light mineral oil-mineral oil INT ONE GTT IN OU QID PRN  
  
 umeclidinium 62.5 mcg/actuation inhaler Commonly known as:  INCRUSE ELLIPTA Take 1 Puff by inhalation daily. * Notice: This list has 2 medication(s) that are the same as other medications prescribed for you. Read the directions carefully, and ask your doctor or other care provider to review them with you. Prescriptions Sent to Pharmacy Refills  
 montelukast (SINGULAIR) 10 mg tablet 0 Sig: Take 1 Tab by mouth daily. Class: Normal  
 Pharmacy: [address] Ph #: [phone] Route: Oral  
 albuterol (PROVENTIL VENTOLIN) 2.5 mg /3 mL (0.083 %) nebulizer solution 2 Sig: 3 mL by Nebulization route every four (4) hours as needed for Wheezing. Class: Normal  
 Pharmacy: [address] Ph #: [phone] Route: Nebulization  
 albuterol (VENTOLIN HFA) 90 mcg/actuation inhaler 2 Sig: INHALE 1 TO 2 PUFFS BY MOUTH EVERY 4 HOURS AS NEEDED FOR WHEEZING Class: Normal  
 Pharmacy: [address] Ph #: [phone]  
 beclomethasone (QVAR) 80 mcg/actuation aero 0 Sig: Take 2 Puffs by inhalation two (2) times a day. Class: Normal  
 Pharmacy: [address] Ph #: [phone] Route: Inhalation  
 fluticasone (FLONASE) 50 mcg/actuation nasal spray 0 Sig: USE 2 SPRAYS IN BOTH NOSTRILS EVERY DAY  Class: Normal  
 Pharmacy: [facility] [address] Ph #: [phone]  
 loratadine (CLARITIN) 10 mg tablet 3 Sig: Take 1 Tab by mouth daily. Class: Normal  
 Pharmacy: [address] Ph #: [phone] Route: Oral  
 clotrimazole-betamethasone (LOTRISONE) 1-0.05 % lotion 0 Sig: Apply  to affected area two (2) times a day. Class: Normal  
 Pharmacy: [address] Ph #: [phone] Route: Topical  
  
Patient Instructions For the itching I would like the patient to take a daily Claritin, I have sent this to his pharmacy I would also like for him to try using the topical antifungal steroid ointment that has been provided for him Refills have been provided to him per his request today I have reviewed his endocrinology visit and recent lab work. If no significant improvement in 2 weeks I would like for him to return to the office for reevaluation Introducing [facility] SERVICES! [facility] introduces Localmint patient portal. Now you can access parts of your medical record, email your doctor's office, and request medication refills online. 1. In your internet browser, go to [email or web address] 2. Click on the First Time User? Click Here link in the Sign In box. You will see the New Member Sign Up page. 3. Enter your Localmint Access Code exactly as it appears below. You will not need to use this code after youve completed the sign-up process. If you do not sign up before the expiration date, you must request a new code. · Localmint Access Code: [email or web address] Expires: 12/13/2018  3:22 PM 
 
4. Enter the last four digits of your Social Security Number (xxxx) and Date of Birth (mm/dd/yyyy) as indicated and click Submit.  You will be taken to the next sign-up page. 5. Create a LeukoDx ID. This will be your Le[employer]Dx login ID and cannot be changed, so think of one that is secure and easy to remember. 6. Create a Le[employer]Dx password. You can change your password at any time. 7. Enter your Password Reset Question and Answer. This can be used at a later time if you forget your password. 8. Enter your e-mail address. You will receive e-mail notification when new information is available in [address] 9. Click Sign Up. You can now view and download portions of your medical record. 10. Click the Download Summary menu link to download a portable copy of your medical information. If you have questions, please visit the Frequently Asked Questions section of the Le[employer]Dx website. Remember, Le[employer]x is NOT to be used for urgent needs. For medical emergencies, dial 911. Now available from your iPhone and Android! Please provide this summary of care documentation to your next provider. Your primary care clinician is listed as J Calros Hodge. If you have any questions after today's visit, please call [phone].

## 2018-10-02 NOTE — PROGRESS NOTES
Family Medicine Follow-Up Progress Note  Patient: Elie Lewis  1965, 47 y.o., male  Encounter Date: 10/2/2018    ASSESSMENT & PLAN      Orders Placed This Encounter    montelukast (SINGULAIR) 10 mg tablet     Sig: Take 1 Tab by mouth daily. Dispense:  90 Tab     Refill:  0    albuterol (PROVENTIL VENTOLIN) 2.5 mg /3 mL (0.083 %) nebulizer solution     Sig: 3 mL by Nebulization route every four (4) hours as needed for Wheezing. Dispense:  24 Each     Refill:  2    albuterol (VENTOLIN HFA) 90 mcg/actuation inhaler     Sig: INHALE 1 TO 2 PUFFS BY MOUTH EVERY 4 HOURS AS NEEDED FOR WHEEZING     Dispense:  1 Inhaler     Refill:  2    beclomethasone (QVAR) 80 mcg/actuation aero     Sig: Take 2 Puffs by inhalation two (2) times a day. Dispense:  6 Inhaler     Refill:  0    fluticasone (FLONASE) 50 mcg/actuation nasal spray     Sig: USE 2 SPRAYS IN BOTH NOSTRILS EVERY DAY     Dispense:  3 Bottle     Refill:  0    loratadine (CLARITIN) 10 mg tablet     Sig: Take 1 Tab by mouth daily. Dispense:  30 Tab     Refill:  3    clotrimazole-betamethasone (LOTRISONE) 1-0.05 % lotion     Sig: Apply  to affected area two (2) times a day. Dispense:  30 mL     Refill:  0         ICD-10-CM ICD-9-CM    1. Itching L29.9 698.9    2. Type 2 diabetes mellitus with hyperglycemia, without long-term current use of insulin ([facility]) E11.65 250.00 montelukast (SINGULAIR) 10 mg tablet     790.29    3. Hypercholesterolemia E78.00 272.0 montelukast (SINGULAIR) 10 mg tablet   4. Moderate persistent asthma without complication Y81.50 127.54 albuterol (PROVENTIL VENTOLIN) 2.5 mg /3 mL (0.083 %) nebulizer solution      albuterol (VENTOLIN HFA) 90 mcg/actuation inhaler      beclomethasone (QVAR) 80 mcg/actuation aero   5.  Tinea cruris B35.6 110.3      Patient Instructions   For the itching I would like the patient to take a daily Claritin, I have sent this to his pharmacy  I would also like for him to try using the topical antifungal steroid ointment that has been provided for him  Refills have been provided to him per his request today  I have reviewed his endocrinology visit and recent lab work. If no significant improvement in 2 weeks I would like for him to return to the office for reevaluation          [facility]  Chief Complaint   Patient presents with    Skin Problem     Itching all over       Chapis Nail is a 47 y.o. male presenting today for several concerns  1) Asthma: generally well controlled, requesting refills, no recent exacerabations. Reports compliance with singulair, qvar, prn albuterol. Not taking OTC allergy med like claritin, zyrtec or allegra. 2) Itching: initially reports all over but now reporting that it is predominantly in his genital and inguinal region, especially itchy on scrotum. Ongoing for over 2 months. Has not noticed significant rash, some pustules versus skin tags per his report. Well controlled diabetes. No new soaps, no new cleaning products or detergents. No significant environmental exposures he can recall. 3) Cholesterol: recently started on cholesterol medicine from endocrine, tolerating per his report. He is planning to have labs rechecked as ordered by endocrinology        Review of Systems  A 12 point review of systems was negative except as noted here or in the HPI. OBJECTIVE  Visit Vitals    BP (!) 141/95 (BP 1 Location: Left arm, BP Patient Position: Sitting)    Pulse 74    Temp 97.8 °F (36.6 °C) (Oral)    Resp 18    Ht 5' 4\" (1.626 m)    Wt 181 lb (82.1 kg)    BMI 31.07 kg/m2       Physical Exam   Constitutional: He is oriented to person, place, and time. He appears well-developed and well-nourished. No distress. HENT:   Head: Normocephalic and atraumatic. Mouth/Throat: Oropharynx is clear and moist. No oropharyngeal exudate. Eyes: Conjunctivae and EOM are normal. No scleral icterus.    wearing glasses   Pulmonary/Chest: Effort normal. No respiratory distress. Abdominal: Soft. Bowel sounds are normal. He exhibits no distension. Genitourinary:   Genitourinary Comments: Several pustules noted in groin L side, hyperpigmentation but no clear rash or demarcated border noted. No urethral discharge or tenderness on palpation   Musculoskeletal: He exhibits no edema. Neurological: He is alert and oriented to person, place, and time. No cranial nerve deficit. Skin: Skin is warm and dry. He is not diaphoretic. As noted above   Psychiatric: His behavior is normal. Judgment and thought content normal.   anxious       No results found for any visits on 10/02/18. HISTORICAL  Reviewed and updated today, and as noted below:    Past Medical History:   Diagnosis Date    Allergic rhinitis 2/21/2014    Asthma     Diabetes ([facility] [address]     History of seasonal allergies     Spots in front of the eye      Past Surgical History:   Procedure Laterality Date    HX ORTHOPAEDIC      L arm surgery R/T broken bone    HX OTHER SURGICAL      R eye surg to remove spot on eye. Family History   Problem Relation Age of Onset    No Known Problems Mother     No Known Problems Father      History   Smoking Status    Never Smoker   Smokeless Tobacco    Never Used     Social History     Social History    Marital status:      Spouse name: N/A    Number of children: N/A    Years of education: N/A     Social History Main Topics    Smoking status: Never Smoker    Smokeless tobacco: Never Used    Alcohol use No    Drug use: No    Sexual activity: Not Asked     Other Topics Concern    None     Social History Narrative     Allergies   Allergen Reactions    Pcn [Penicillins] Other (comments)     Father told him as child he was allergic to PCN.  He does not know if he has every been on a cephalosporin that he has tolerated       Orders Only on 09/07/2018   Component Date Value Ref Range Status    Glucose 09/07/2018 96  65 - 99 mg/dL Final    BUN 09/07/2018 9  6 - 24 mg/dL Final    Creatinine 09/07/2018 1.17  0.76 - 1.27 mg/dL Final    GFR est non-AA 09/07/2018 72  >59 mL/min/1.73 Final    GFR est AA 09/07/2018 83  >59 mL/min/1.73 Final    BUN/Creatinine ratio 09/07/2018 8* 9 - 20 Final    Sodium 09/07/2018 139  134 - 144 mmol/L Final    Potassium 09/07/2018 4.7  3.5 - 5.2 mmol/L Final    Chloride 09/07/2018 101  96 - 106 mmol/L Final    CO2 09/07/2018 21  20 - 29 mmol/L Final    Calcium 09/07/2018 8.9  8.7 - 10.2 mg/dL Final    Protein, total 09/07/2018 6.8  6.0 - 8.5 g/dL Final    Albumin 09/07/2018 4.0  3.5 - 5.5 g/dL Final    GLOBULIN, TOTAL 09/07/2018 2.8  1.5 - 4.5 g/dL Final    A-G Ratio 09/07/2018 1.4  1.2 - 2.2 Final    Bilirubin, total 09/07/2018 1.0  0.0 - 1.2 mg/dL Final    Alk. phosphatase 09/07/2018 69  39 - 117 IU/L Final    AST (SGOT) 09/07/2018 21  0 - 40 IU/L Final    ALT (SGPT) 09/07/2018 28  0 - 44 IU/L Final    Cholesterol, total 09/07/2018 239* 100 - 199 mg/dL Final    Triglyceride 09/07/2018 142  0 - 149 mg/dL Final    HDL Cholesterol 09/07/2018 36* >39 mg/dL Final    VLDL, calculated 09/07/2018 28  5 - 40 mg/dL Final    LDL, calculated 09/07/2018 175* 0 - 99 mg/dL Final    Creatinine, urine 09/07/2018 66.0  Not Estab. mg/dL Final    Microalbumin, urine 09/07/2018 <3.0  Not Estab. ug/mL Final    Microalb/Creat ratio (ug/mg creat.) 09/07/2018 <4.5  0.0 - 30.0 mg/g creat Final    Hemoglobin A1c 09/07/2018 6.2* 4.8 - 5.6 % Final    Comment:          Prediabetes: 5.7 - 6.4           Diabetes: >6.4           Glycemic control for adults with diabetes: <7.0      Estimated average glucose 09/07/2018 131  mg/dL Final    INTERPRETATION 09/07/2018 Note   Final    Supplemental report is available.     PDF Image [id number] Not applicable   Final         Natanael Vyas MD  [facility]  10/02/18 11:31 AM    Portions of this note may have been populated using smart dictation software and may have \"sounds-like\" errors present. Pt was counseled on risks, benefits and alternatives of treatment options. All questions were asked and answered and the patient was agreeable with the treatment plan as outlined.

## 2018-10-03 ENCOUNTER — TELEPHONE (OUTPATIENT)
Dept: FAMILY MEDICINE CLINIC | Age: 53
End: 2018-10-03

## 2018-10-03 NOTE — TELEPHONE ENCOUNTER
It is $4 for a 30 day supply at [facility], just about one of the cheapest medications we have, I suggest he buy over the counter than try to pay the copay because it is generic loratidine and very affordable.  I tried to recommend this to him yesterday

## 2018-10-03 NOTE — TELEPHONE ENCOUNTER
Called and spoke with pt, and he has been advised and states understanding of this. Pt states he will try to get medication from [facility].

## 2018-10-03 NOTE — TELEPHONE ENCOUNTER
Pt called the office and states he can not afford Claritin 10 MG. Pt asks if Dr. Rehan Sandoval can prescribe something less expensive.

## 2018-10-11 NOTE — TELEPHONE ENCOUNTER
Pt called stating Dr. Anurag Meeks told him to purchase his Claritin over the counter, but it's too expensive and he wants prescription for this and his clotrimazole lotion sent to [address]  Sarojm

## 2018-10-12 RX ORDER — LORATADINE 10 MG/1
10 TABLET ORAL DAILY
Qty: 30 TAB | Refills: 11 | Status: SHIPPED | OUTPATIENT
Start: 2018-10-12 | End: 2018-10-30

## 2018-10-12 RX ORDER — CLOTRIMAZOLE AND BETAMETHASONE DIPROPIONATE 10; .5 MG/ML; MG/ML
LOTION TOPICAL 2 TIMES DAILY
Qty: 30 ML | Refills: 0 | Status: SHIPPED | OUTPATIENT
Start: 2018-10-12 | End: 2018-10-30

## 2018-10-30 ENCOUNTER — OFFICE VISIT (OUTPATIENT)
Dept: FAMILY MEDICINE CLINIC | Age: 53
End: 2018-10-30

## 2018-10-30 VITALS
TEMPERATURE: 97.4 F | HEIGHT: 64 IN | SYSTOLIC BLOOD PRESSURE: 145 MMHG | HEART RATE: 74 BPM | BODY MASS INDEX: 30.73 KG/M2 | DIASTOLIC BLOOD PRESSURE: 95 MMHG | WEIGHT: 180 LBS | RESPIRATION RATE: 18 BRPM

## 2018-10-30 DIAGNOSIS — L29.9 ITCHING: ICD-10-CM

## 2018-10-30 DIAGNOSIS — L85.3 XEROSIS OF SKIN: ICD-10-CM

## 2018-10-30 DIAGNOSIS — R03.0 ELEVATED BLOOD PRESSURE READING: ICD-10-CM

## 2018-10-30 DIAGNOSIS — R21 RASH: Primary | ICD-10-CM

## 2018-10-30 RX ORDER — TRIAMCINOLONE ACETONIDE 1 MG/ML
LOTION TOPICAL 2 TIMES DAILY
Qty: 60 ML | Refills: 0 | Status: SHIPPED | OUTPATIENT
Start: 2018-10-30 | End: 2018-12-11

## 2018-10-30 RX ORDER — HYDROXYZINE 50 MG/1
50 TABLET, FILM COATED ORAL
Qty: 30 TAB | Refills: 0 | Status: SHIPPED | OUTPATIENT
Start: 2018-10-30 | End: 2018-11-09

## 2018-10-30 NOTE — PROGRESS NOTES
Chief Complaint   Patient presents with    Rash     generalized; was given claritin and lotrisone and no improvement     1. Have you been to the ER, urgent care clinic since your last visit? Hospitalized since your last visit? No     2. Have you seen or consulted any other health care providers outside of the [address] [place] [address] since your last visit? Include any pap smears or colon screening.  No

## 2018-10-30 NOTE — PROGRESS NOTES
HISTORY OF PRESENT ILLNESS  Eze Alan is a 47 y.o. male. Eze Alan presents with a rash with itching, \"everywhere\", for almost a month. He got temporary relief with Claritin and Lotrisone. It is getting worse, and scratching it makes it worse. Nobody in his household has a rash. He tells me he has dry skin. Review of Systems   Skin: Positive for itching and rash. Visit Vitals  BP (!) 145/95   Pulse 74   Temp 97.4 °F (36.3 °C) (Oral)   Resp 18   Ht 5' 4\" (1.626 m)   Wt 180 lb (81.6 kg)   BMI 30.90 kg/m²     BP Readings from Last 3 Encounters:   10/30/18 (!) 145/95   10/02/18 (!) 141/95   09/14/18 (!) 142/97     Physical Exam   Skin:   A few excoriated pink patches over the arms and legs, one area with a few papules. ASSESSMENT and PLAN    ICD-10-CM ICD-9-CM    1. Rash R21 782.1 triamcinolone (KENALOG) 0.1 % lotion   2. Xerosis of skin L85.3 706.8 triamcinolone (KENALOG) 0.1 % lotion   3. Itching L29.9 698.9 hydrOXYzine HCl (ATARAX) 50 mg tablet   4. Elevated blood pressure reading R03.0 796.2         Rash and itching due to dry skin  Kenalog lotion and Atarax prn  Proper skin care discussed, information given  Consider Dermatology referral if not improvement    Follow-up Disposition:  Return in about 6 weeks (around 12/11/2018) for blood pressure. Reviewed plan of care. Patient has provided input and agrees with goals.

## 2018-10-31 ENCOUNTER — TELEPHONE (OUTPATIENT)
Dept: FAMILY MEDICINE CLINIC | Age: 53
End: 2018-10-31

## 2018-11-01 NOTE — TELEPHONE ENCOUNTER
Called requested number twice. Phone never rang. The requested number is also the number on file for pt, so unable to call an alternative number.

## 2018-12-11 ENCOUNTER — OFFICE VISIT (OUTPATIENT)
Dept: FAMILY MEDICINE CLINIC | Age: 53
End: 2018-12-11

## 2018-12-11 VITALS
RESPIRATION RATE: 18 BRPM | HEIGHT: 64 IN | HEART RATE: 88 BPM | WEIGHT: 180 LBS | TEMPERATURE: 97.5 F | SYSTOLIC BLOOD PRESSURE: 137 MMHG | DIASTOLIC BLOOD PRESSURE: 100 MMHG | BODY MASS INDEX: 30.73 KG/M2

## 2018-12-11 DIAGNOSIS — L29.9 ITCHING: ICD-10-CM

## 2018-12-11 DIAGNOSIS — R21 RASH: ICD-10-CM

## 2018-12-11 DIAGNOSIS — I10 BENIGN ESSENTIAL HTN: Primary | ICD-10-CM

## 2018-12-11 DIAGNOSIS — E11.9 DIABETES MELLITUS WITHOUT COMPLICATION (HCC): ICD-10-CM

## 2018-12-11 RX ORDER — DICLOFENAC SODIUM 75 MG/1
TABLET, DELAYED RELEASE ORAL
Refills: 0 | COMMUNITY
Start: 2018-11-28 | End: 2019-03-20

## 2018-12-11 RX ORDER — FLUTICASONE PROPIONATE 50 MCG
SPRAY, SUSPENSION (ML) NASAL
Qty: 3 BOTTLE | Refills: 3 | Status: SHIPPED | OUTPATIENT
Start: 2018-12-11 | End: 2019-10-25 | Stop reason: SDUPTHER

## 2018-12-11 RX ORDER — LORATADINE 10 MG/1
10 TABLET ORAL
Qty: 30 TAB | Refills: 0 | Status: SHIPPED | OUTPATIENT
Start: 2018-12-11 | End: 2019-10-25

## 2018-12-11 RX ORDER — LISINOPRIL 10 MG/1
10 TABLET ORAL DAILY
Qty: 30 TAB | Refills: 1 | Status: SHIPPED | OUTPATIENT
Start: 2018-12-11 | End: 2019-03-06 | Stop reason: SDUPTHER

## 2018-12-11 RX ORDER — TRIAMCINOLONE ACETONIDE 1 MG/ML
LOTION TOPICAL 2 TIMES DAILY
Qty: 120 ML | Refills: 0 | Status: SHIPPED | OUTPATIENT
Start: 2018-12-11 | End: 2019-05-15 | Stop reason: SDUPTHER

## 2018-12-11 NOTE — PROGRESS NOTES
HISTORY OF PRESENT ILLNESS Lyndsay Rosas is a 47 y.o. male. Lyndsay Rosas is here for a blood pressure check. I last saw him for generalized itching and it is no better. Blood Pressure Check The history is provided by the patient. This is a chronic problem. The current episode started more than 1 week ago. The problem occurs constantly. The problem has not changed since onset. Pertinent negatives include no chest pain, no abdominal pain, no headaches and no shortness of breath. Nothing aggravates the symptoms. Nothing relieves the symptoms. He has tried nothing for the symptoms. Skin Problem Pertinent negatives include no chest pain, no abdominal pain, no headaches and no shortness of breath. Review of Systems Constitutional: Negative for weight loss. No weight gain Eyes: Negative for blurred vision. Respiratory: Negative for shortness of breath. Cardiovascular: Negative for chest pain and leg swelling. Gastrointestinal: Negative for abdominal pain. Neurological: Negative for dizziness, sensory change, speech change, focal weakness and headaches. Visit Vitals BP (!) 137/100 Pulse 88 Temp 97.5 °F (36.4 °C) (Oral) Resp 18 Ht 5' 4\" (1.626 m) Wt 180 lb (81.6 kg) BMI 30.90 kg/m² BP Readings from Last 3 Encounters:  
12/11/18 (!) 137/100  
10/30/18 (!) 145/95  
10/02/18 (!) 141/95 Physical Exam  
Constitutional: He is oriented to person, place, and time. He appears well-developed and well-nourished. No distress. Cardiovascular: Normal rate, regular rhythm and normal heart sounds. Exam reveals no gallop and no friction rub. No murmur heard. Pulmonary/Chest: Effort normal and breath sounds normal. No respiratory distress. He has no wheezes. He has no rales. Musculoskeletal: He exhibits no edema. Neurological: He is alert and oriented to person, place, and time. Skin: Skin is warm and dry. He is not diaphoretic. Nursing note and vitals reviewed. ASSESSMENT and PLAN 
  ICD-10-CM ICD-9-CM 1. Benign essential HTN I10 401.1 lisinopril (PRINIVIL, ZESTRIL) 10 mg tablet 2. Itching L29.9 698.9 REFERRAL TO DERMATOLOGY  
   triamcinolone (KENALOG) 0.1 % lotion  
   loratadine (CLARITIN) 10 mg tablet 3. Diabetes mellitus without complication (HCC) K44.9 250.00 lisinopril (PRINIVIL, ZESTRIL) 10 mg tablet 4. Rash R21 782.1 triamcinolone (KENALOG) 0.1 % lotion Newly diagnosed hypertension in a diabetic Continued itching Start lisinopril Dermatology referral 
Refills per orders Follow-up Disposition: 
Return in about 6 weeks (around 1/22/2019) for blood pressure. Reviewed plan of care. Patient has provided input and agrees with goals.

## 2018-12-11 NOTE — PROGRESS NOTES
Chief Complaint Patient presents with  Blood Pressure Check 6 wk f/u  
 Skin Problem f/u for itching; \"not better\" per pt; all over body 1. Have you been to the ER, urgent care clinic since your last visit? Hospitalized since your last visit? No  
 
2. Have you seen or consulted any other health care providers outside of the [address] [place] [address] since your last visit? Include any pap smears or colon screening. Yes orthopedic

## 2018-12-28 ENCOUNTER — TELEPHONE (OUTPATIENT)
Dept: FAMILY MEDICINE CLINIC | Age: 53
End: 2018-12-28

## 2018-12-28 NOTE — TELEPHONE ENCOUNTER
Pt is requesting a prescription for his cough and mucous that he's had x 4 days. Pt states he's \"tried everything\" but no relief. Pt states he was just in the office 1 week ago for itching. I advised pt that normally we don't prescribe unless pt is evaluated in office. I recommended Mucinex and Coricidin HBP. Pt states he \"can't afford the Mucinex\". Pt would at least like the mucinex and coricidin to be called in if that is what you recommend. Please advise. I also told pt he could go to Urgent Care.

## 2018-12-28 NOTE — TELEPHONE ENCOUNTER
mucinex and coricidin are OK to use but will not improve symptoms faster. Rest, fluids, humidified air will help as well. There are no prescriptions that can improve a viral illness such as a URI faster. I agree with UC if he desires to be evaluated for Rx management.

## 2018-12-28 NOTE — TELEPHONE ENCOUNTER
----- Message from Jimenez Alfred sent at 12/28/2018  1:31 PM EST -----  Regarding:  / telephone   Patient is requesting a call back regarding recommendations for a medication that will clear up his mucus and cold coughing Best contact [phone]

## 2019-01-25 LAB
ALBUMIN SERPL-MCNC: 4 G/DL (ref 3.5–5.5)
ALBUMIN/CREAT UR: <2.7 MG/G CREAT (ref 0–30)
ALBUMIN/GLOB SERPL: 1.5 {RATIO} (ref 1.2–2.2)
ALP SERPL-CCNC: 58 IU/L (ref 39–117)
ALT SERPL-CCNC: 27 IU/L (ref 0–44)
AST SERPL-CCNC: 18 IU/L (ref 0–40)
BILIRUB SERPL-MCNC: 1 MG/DL (ref 0–1.2)
BUN SERPL-MCNC: 10 MG/DL (ref 6–24)
BUN/CREAT SERPL: 9 (ref 9–20)
CALCIUM SERPL-MCNC: 8.9 MG/DL (ref 8.7–10.2)
CHLORIDE SERPL-SCNC: 105 MMOL/L (ref 96–106)
CHOLEST SERPL-MCNC: 147 MG/DL (ref 100–199)
CO2 SERPL-SCNC: 22 MMOL/L (ref 20–29)
CREAT SERPL-MCNC: 1.15 MG/DL (ref 0.76–1.27)
CREAT UR-MCNC: 110.5 MG/DL
EST. AVERAGE GLUCOSE BLD GHB EST-MCNC: 134 MG/DL
GLOBULIN SER CALC-MCNC: 2.7 G/DL (ref 1.5–4.5)
GLUCOSE SERPL-MCNC: 110 MG/DL (ref 65–99)
HBA1C MFR BLD: 6.3 % (ref 4.8–5.6)
HDLC SERPL-MCNC: 34 MG/DL
INTERPRETATION, 910389: NORMAL
LDLC SERPL CALC-MCNC: 81 MG/DL (ref 0–99)
Lab: NORMAL
MICROALBUMIN UR-MCNC: <3 UG/ML
POTASSIUM SERPL-SCNC: 4.2 MMOL/L (ref 3.5–5.2)
PROT SERPL-MCNC: 6.7 G/DL (ref 6–8.5)
SODIUM SERPL-SCNC: 143 MMOL/L (ref 134–144)
TRIGL SERPL-MCNC: 160 MG/DL (ref 0–149)
VLDLC SERPL CALC-MCNC: 32 MG/DL (ref 5–40)

## 2019-01-31 ENCOUNTER — OFFICE VISIT (OUTPATIENT)
Dept: ENDOCRINOLOGY | Age: 54
End: 2019-01-31

## 2019-01-31 VITALS
TEMPERATURE: 96.1 F | OXYGEN SATURATION: 98 % | SYSTOLIC BLOOD PRESSURE: 106 MMHG | RESPIRATION RATE: 16 BRPM | WEIGHT: 174 LBS | HEIGHT: 64 IN | DIASTOLIC BLOOD PRESSURE: 74 MMHG | HEART RATE: 69 BPM | BODY MASS INDEX: 29.71 KG/M2

## 2019-01-31 DIAGNOSIS — E11.65 TYPE 2 DIABETES MELLITUS WITH HYPERGLYCEMIA, WITHOUT LONG-TERM CURRENT USE OF INSULIN (HCC): Primary | ICD-10-CM

## 2019-01-31 DIAGNOSIS — E78.00 HYPERCHOLESTEROLEMIA: ICD-10-CM

## 2019-01-31 NOTE — PROGRESS NOTES
[facility] Venancio Oliva MD 
 
    [address] [phone] Fax [phone] Patient Information Date:1/31/2019 Name : Raul Seals 47 y.o.    
YOB: 1965 Referred by: Keron Chandler MD  
 
 
 
Chief Complaint Patient presents with  Diabetes History of Present Illness: Raul Seals is a 47 y.o. male here for fu  of  Type 2 Diabetes Mellitus. Type 2 Diabetes was diagnosed in 2016 . End organ effects of diabetes: none. Cardiovascular risk factors: dyslipidemia, diabetes mellitus, male gender He has changed the diet, blood glucoses improved. Has lost some weight Hypoglycemia: no 
 
Taking metformin consistently He has gradually been gaining weight. His job has changed which is mostly sedentary. Wt Readings from Last 3 Encounters:  
01/31/19 174 lb (78.9 kg) 12/11/18 180 lb (81.6 kg) 10/30/18 180 lb (81.6 kg) BP Readings from Last 3 Encounters:  
01/31/19 106/74  
12/11/18 (!) 137/100  
10/30/18 (!) 145/95 Past Medical History:  
Diagnosis Date  Allergic rhinitis 2/21/2014  Asthma  Diabetes ([facility] [address]  History of seasonal allergies  Spots in front of the eye Current Outpatient Medications Medication Sig  
 fluticasone (FLONASE) 50 mcg/actuation nasal spray USE 2 SPRAYS IN BOTH NOSTRILS EVERY DAY  lisinopril (PRINIVIL, ZESTRIL) 10 mg tablet Take 1 Tab by mouth daily.  triamcinolone (KENALOG) 0.1 % lotion Apply  to affected area two (2) times a day.  loratadine (CLARITIN) 10 mg tablet Take 1 Tab by mouth daily as needed for Itching.  montelukast (SINGULAIR) 10 mg tablet Take 1 Tab by mouth daily.  albuterol (PROVENTIL VENTOLIN) 2.5 mg /3 mL (0.083 %) nebulizer solution 3 mL by Nebulization route every four (4) hours as needed for Wheezing.   
 albuterol (VENTOLIN HFA) 90 mcg/actuation inhaler INHALE 1 TO 2 PUFFS BY MOUTH EVERY 4 HOURS AS NEEDED FOR WHEEZING  
 beclomethasone (QVAR) 80 mcg/actuation aero Take 2 Puffs by inhalation two (2) times a day.  metFORMIN ER (GLUCOPHAGE XR) 500 mg tablet Take 1 Tab by mouth daily. For DM2  rosuvastatin (CRESTOR) 10 mg tablet Take 1 Tab by mouth nightly.  diclofenac EC (VOLTAREN) 75 mg EC tablet No current facility-administered medications for this visit. Allergies Allergen Reactions  Pcn [Penicillins] Other (comments) Father told him as child he was allergic to PCN. He does not know if he has every been on a cephalosporin that he has tolerated Review of Systems: 
-  
- Cardiovascular: no chest pain ,no palpitations - Respiratory: no cough no shortness of breath 
- Gastrointestinal: no dysphagia no  abdominal pain - Musculoskeletal: no joint pains no  weakness - Integumentary: no rashes - Neurological: no numbness, tingling, no  headaches - Psychiatric: no depression no  anxiety - Endocrine: no heat or cold intolerance Physical Examination: 
 Blood pressure 106/74, pulse 69, temperature 96.1 °F (35.6 °C), temperature source Oral, resp. rate 16, height 5' 4\" (1.626 m), weight 174 lb (78.9 kg), SpO2 98 %. Estimated body mass index is 29.87 kg/m² as calculated from the following: 
  Height as of this encounter: 5' 4\" (1.626 m). -   Weight as of this encounter: 174 lb (78.9 kg). - General: pleasant, no distress, good eye contact 
- HEENT: no pallor, no periorbital edema, EOMI 
- Neck: supple, no thyromegaly, no nodules - Cardiovascular: regular, normal rate, normal S1 and S2 
- Respiratory: clear to auscultation bilaterally - Gastrointestinal: soft, nontender, nondistended,  BS + 
- Musculoskeletal: no proximal muscle weakness in upper or lower extremities -  
- Neurological:alert and oriented - Psychiatric: normal mood and affect 
- Skin: color, texture, turgor normal.  
 
 
Data Reviewed:  
 
Diabetic foot exam: January 2019 Left:   
 Vibratory sensation normal  
 Filament test normal sensation with micro filament Pulse DP: 1+ Deformities: None Right:  
 Vibratory sensation normal 
 Filament test normal sensation with micro filament Pulse DP: 1+ Deformities: None Lab Results Component Value Date/Time Hemoglobin A1c 6.3 (H) 01/24/2019 08:47 AM  
 Hemoglobin A1c 6.2 (H) 09/07/2018 09:51 AM  
 Hemoglobin A1c 6.1 (H) 02/12/2018 09:28 AM  
 Hemoglobin A1c, External 7.4 09/23/2016 Glucose 110 (H) 01/24/2019 08:47 AM  
 Glucose (POC) 124 (H) 12/31/2013 10:32 PM  
 Microalb/Creat ratio (ug/mg creat.) <2.7 01/24/2019 08:47 AM  
 LDL, calculated 81 01/24/2019 08:47 AM  
 Creatinine (POC) 1.3 12/31/2013 10:32 PM  
 Creatinine 1.15 01/24/2019 08:47 AM  
  
Lab Results Component Value Date/Time Cholesterol, total 147 01/24/2019 08:47 AM  
 HDL Cholesterol 34 (L) 01/24/2019 08:47 AM  
 LDL, calculated 81 01/24/2019 08:47 AM  
 Triglyceride 160 (H) 01/24/2019 08:47 AM  
 
 
Lab Results Component Value Date/Time ALT (SGPT) 27 01/24/2019 08:47 AM  
 AST (SGOT) 18 01/24/2019 08:47 AM  
 Alk. phosphatase 58 01/24/2019 08:47 AM  
 Bilirubin, direct 0.27 08/14/2017 09:08 AM  
 Bilirubin, total 1.0 01/24/2019 08:47 AM  
 
 
Lab Results Component Value Date/Time GFR est AA 84 01/24/2019 08:47 AM  
 GFR est non-AA 73 01/24/2019 08:47 AM  
 Creatinine (POC) 1.3 12/31/2013 10:32 PM  
 Creatinine 1.15 01/24/2019 08:47 AM  
 BUN 10 01/24/2019 08:47 AM  
 BUN (POC) 13 12/31/2013 10:32 PM  
 Sodium (POC) 140 12/31/2013 10:32 PM  
 Sodium 143 01/24/2019 08:47 AM  
 Potassium 4.2 01/24/2019 08:47 AM  
 Potassium (POC) 3.2 (L) 12/31/2013 10:32 PM  
 Chloride (POC) 105 12/31/2013 10:32 PM  
 Chloride 105 01/24/2019 08:47 AM  
 CO2 22 01/24/2019 08:47 AM  
  
 
Assessment/Plan: 1. Type 2 diabetes mellitus with hyperglycemia, without long-term current use of insulin (HCC) 2. Hypercholesterolemia 1. Type 2 Diabetes Mellitus Lab Results Component Value Date/Time Hemoglobin A1c 6.3 (H) 01/24/2019 08:47 AM  
 Hemoglobin A1c (POC) 5.9 08/21/2017 12:33 PM  
 Hemoglobin A1c, External 7.4 09/23/2016 He is on Metformin Controlled Diabetic issues reviewed : glycemic goals ,  low carbohydrate diet, weight control , home glucose monitoring emphasized,   
 
2. Hyperlipidemia : Statin  
weight loss encouraged 4. Obesity:Body mass index is 29.87 kg/m². Discussed about the importance of exercise and carbohydrate portion control. 5.  Fatigue Stressed importance of weight loss, good sleep hygiene Resume exercise There are no Patient Instructions on file for this visit. Follow-up Disposition: Not on File Thank you for allowing me to participate in the care of this patient. Nohelia Zimmerman MD 
 
 
Patient verbalized understanding

## 2019-01-31 NOTE — PROGRESS NOTES
Javier Nicholson is a 47 y.o. male here for Chief Complaint Patient presents with  Diabetes Functional glucose monitor and record keeping system? -yes Eye exam within last year? - on file Foot exam within last year? - due 1. Have you been to the ER, urgent care clinic since your last visit? Hospitalized since your last visit? -no 
 
2. Have you seen or consulted any other health care providers outside of the [address] [place] [address] since your last visit? Include any pap smears or colon screening.-

## 2019-02-24 PROBLEM — I10 BENIGN ESSENTIAL HTN: Status: ACTIVE | Noted: 2019-02-24

## 2019-02-25 ENCOUNTER — OFFICE VISIT (OUTPATIENT)
Dept: FAMILY MEDICINE CLINIC | Age: 54
End: 2019-02-25

## 2019-02-25 VITALS
HEIGHT: 64 IN | DIASTOLIC BLOOD PRESSURE: 73 MMHG | RESPIRATION RATE: 18 BRPM | SYSTOLIC BLOOD PRESSURE: 107 MMHG | TEMPERATURE: 97.4 F | HEART RATE: 72 BPM | BODY MASS INDEX: 29.53 KG/M2 | WEIGHT: 173 LBS

## 2019-02-25 DIAGNOSIS — I10 BENIGN ESSENTIAL HTN: Primary | ICD-10-CM

## 2019-02-25 RX ORDER — MINERAL OIL
ENEMA (ML) RECTAL
Refills: 0 | COMMUNITY
Start: 2019-02-21 | End: 2020-09-28

## 2019-02-25 NOTE — PROGRESS NOTES
HISTORY OF PRESENT ILLNESS Vickie Hernandez is a 47 y.o. male. Vickie Hernandez is here to follow up on his HTN. He saw Dermatology and his hct was high (15.5), so her was referred to Hematology who he is seeing him. Hypertension The history is provided by the patient. This is a chronic problem. The current episode started more than 1 week ago. The problem occurs constantly. The problem has been gradually improving. Pertinent negatives include no chest pain, no abdominal pain, no headaches and no shortness of breath. Nothing aggravates the symptoms. The symptoms are relieved by medications. Treatments tried: lisinopril. The treatment provided significant relief. Review of Systems Constitutional: Positive for weight loss. No weight gain Eyes: Negative for blurred vision. Respiratory: Negative for shortness of breath. Cardiovascular: Negative for chest pain and leg swelling. Gastrointestinal: Negative for abdominal pain. Neurological: Negative for dizziness, sensory change, speech change, focal weakness and headaches. Visit Vitals /73 Pulse 72 Temp 97.4 °F (36.3 °C) (Oral) Resp 18 Ht 5' 4\" (1.626 m) Wt 173 lb (78.5 kg) BMI 29.70 kg/m² BP Readings from Last 3 Encounters:  
01/31/19 106/74  
12/11/18 (!) 137/100  
10/30/18 (!) 145/95 Physical Exam  
Constitutional: He is oriented to person, place, and time. He appears well-developed and well-nourished. No distress. Cardiovascular: Normal rate, regular rhythm and normal heart sounds. Exam reveals no gallop and no friction rub. No murmur heard. Pulmonary/Chest: Effort normal and breath sounds normal. No respiratory distress. He has no wheezes. He has no rales. Musculoskeletal: He exhibits no edema. Neurological: He is alert and oriented to person, place, and time. Skin: Skin is warm and dry. He is not diaphoretic. Nursing note and vitals reviewed. ASSESSMENT and PLAN 
  ICD-10-CM ICD-9-CM 1. Benign essential HTN W97 686.9 METABOLIC PANEL, BASIC  
   CANCELED: AMB POC EKG ROUTINE W/ 12 LEADS, INTER & REP Blood pressure controlled Recent labs reviewed Labs per orders. Continue current plans. Follow-up Disposition: 
Return in about 3 months (around 5/25/2019) for blood pressure, EKG. Reviewed plan of care. Patient has provided input and agrees with goals.

## 2019-02-25 NOTE — PROGRESS NOTES
Chief Complaint Patient presents with  Blood Pressure Check  
  and dermatology/hematology f/u 1. Have you been to the ER, urgent care clinic since your last visit? Hospitalized since your last visit? No  
 
2. Have you seen or consulted any other health care providers outside of the [address] [place] [address] since your last visit? Include any pap smears or colon screening.  Yes hematologist and dermatologist

## 2019-02-26 LAB
BUN SERPL-MCNC: 16 MG/DL (ref 6–24)
BUN/CREAT SERPL: 13 (ref 9–20)
CALCIUM SERPL-MCNC: 9.4 MG/DL (ref 8.7–10.2)
CHLORIDE SERPL-SCNC: 104 MMOL/L (ref 96–106)
CO2 SERPL-SCNC: 22 MMOL/L (ref 20–29)
CREAT SERPL-MCNC: 1.22 MG/DL (ref 0.76–1.27)
GLUCOSE SERPL-MCNC: 77 MG/DL (ref 65–99)
POTASSIUM SERPL-SCNC: 4.8 MMOL/L (ref 3.5–5.2)
SODIUM SERPL-SCNC: 140 MMOL/L (ref 134–144)

## 2019-03-06 DIAGNOSIS — E11.9 DIABETES MELLITUS WITHOUT COMPLICATION (HCC): ICD-10-CM

## 2019-03-06 DIAGNOSIS — I10 BENIGN ESSENTIAL HTN: ICD-10-CM

## 2019-03-06 RX ORDER — LISINOPRIL 10 MG/1
10 TABLET ORAL DAILY
Qty: 90 TAB | Refills: 3 | Status: SHIPPED | OUTPATIENT
Start: 2019-03-06 | End: 2020-01-15 | Stop reason: SDUPTHER

## 2019-03-06 NOTE — TELEPHONE ENCOUNTER
Pt totally out of Lisinopril and is asking for refill to be sent to [address] today please. Pt reminded of 44-77 hour refill policy for future refills and agrees to plan.   Alonzo

## 2019-03-14 ENCOUNTER — TELEPHONE (OUTPATIENT)
Dept: FAMILY MEDICINE CLINIC | Age: 54
End: 2019-03-14

## 2019-03-14 NOTE — TELEPHONE ENCOUNTER
Pt called stating he's still experiencing rash all over his body, was referred by dermatologist to hematologist but feels they haven't done anything for the elevated white blood cell count, elevated RBC's, is concerned about heart failure, and was also advised by his endocrinologist to have Dr. Kris Neff refer him for sleep study to evaluate for sleep apnea. Pt frustrated because he doesn't feel well and wants call back from Dr. Kris Neff or her nurse today please.  Ldm

## 2019-03-15 NOTE — TELEPHONE ENCOUNTER
Please let him know I am waiting for his office notes. In the mean time, he needs to contact Dermatology for recommendations concerning his itching.

## 2019-03-18 DIAGNOSIS — E78.00 HYPERCHOLESTEROLEMIA: ICD-10-CM

## 2019-03-18 DIAGNOSIS — E11.65 TYPE 2 DIABETES MELLITUS WITH HYPERGLYCEMIA, WITHOUT LONG-TERM CURRENT USE OF INSULIN (HCC): ICD-10-CM

## 2019-03-19 RX ORDER — MONTELUKAST SODIUM 10 MG/1
TABLET ORAL
Qty: 90 TAB | Refills: 0 | Status: SHIPPED | OUTPATIENT
Start: 2019-03-19 | End: 2019-10-25 | Stop reason: SDUPTHER

## 2019-03-19 NOTE — TELEPHONE ENCOUNTER
More records received from [facility] office 3/19/19 and placed in nurse's in basket for Dr. Mohini Kirk to review.  Sarojm

## 2019-03-20 ENCOUNTER — OFFICE VISIT (OUTPATIENT)
Dept: FAMILY MEDICINE CLINIC | Age: 54
End: 2019-03-20

## 2019-03-20 VITALS
TEMPERATURE: 97.8 F | BODY MASS INDEX: 29.37 KG/M2 | HEIGHT: 64 IN | DIASTOLIC BLOOD PRESSURE: 78 MMHG | HEART RATE: 64 BPM | OXYGEN SATURATION: 99 % | WEIGHT: 172 LBS | RESPIRATION RATE: 18 BRPM | SYSTOLIC BLOOD PRESSURE: 114 MMHG

## 2019-03-20 DIAGNOSIS — J45.41 MODERATE PERSISTENT ASTHMA WITH EXACERBATION: Primary | ICD-10-CM

## 2019-03-20 DIAGNOSIS — J45.40 MODERATE PERSISTENT ASTHMA WITHOUT COMPLICATION: ICD-10-CM

## 2019-03-20 DIAGNOSIS — R21 RASH: ICD-10-CM

## 2019-03-20 DIAGNOSIS — R71.8 ELEVATED RED BLOOD CELL COUNT: ICD-10-CM

## 2019-03-20 RX ORDER — PREDNISONE 20 MG/1
20 TABLET ORAL 3 TIMES DAILY
Qty: 15 TAB | Refills: 0 | Status: SHIPPED | OUTPATIENT
Start: 2019-03-20 | End: 2019-03-25

## 2019-03-20 RX ORDER — GUAIFENESIN 100 MG/5ML
LIQUID (ML) ORAL
Refills: 2 | COMMUNITY
Start: 2019-03-19 | End: 2019-10-25

## 2019-03-20 RX ORDER — ALBUTEROL SULFATE 90 UG/1
AEROSOL, METERED RESPIRATORY (INHALATION)
Qty: 1 INHALER | Refills: 2 | Status: SHIPPED | OUTPATIENT
Start: 2019-03-20 | End: 2020-06-02

## 2019-03-20 NOTE — PROGRESS NOTES
Chief Complaint   Patient presents with    Insomnia     discuss lab work for hematology; discuss new nebulizer machine    Allergies     1. Have you been to the ER, urgent care clinic since your last visit? Hospitalized since your last visit? No     2. Have you seen or consulted any other health care providers outside of the [address] [place] [address] since your last visit? Include any pap smears or colon screening.  No

## 2019-03-20 NOTE — PROGRESS NOTES
HISTORY OF PRESENT ILLNESS Haley Herrera is a 47 y.o. male. Haley Herrera is here today to review his lab results. Evidently, he saw Dermatology who referred him to Hematology to rule out polycythemia vera. His blood was drawn yesterday. He continues to have an itchy rash. Also, his asthma has been acting up for the past several days. He is using QVAR regularly and prn Singulair. Symptoms include a nonproductive cough, wheezing and shortness of breath. Nothing makes him worse. Albuterol givens him temporary relief. Also, he needs a new nebulizer. He is upset because we would not discuss his wife's health information with him yesterday (her HIPPA information was outdated). Review of Systems Constitutional: Positive for malaise/fatigue. Negative for chills and fever. HENT: Negative for congestion, ear pain and sore throat. Eyes: Negative for discharge and redness. Respiratory: Positive for cough, shortness of breath and wheezing. Negative for sputum production. Cardiovascular: Negative for chest pain. Visit Vitals /78 Pulse 64 Comment: manually Temp 97.8 °F (36.6 °C) (Oral) Resp 18 Ht 5' 4\" (1.626 m) Wt 172 lb (78 kg) SpO2 99%  L/min BMI 29.52 kg/m² Physical Exam  
Constitutional: He is oriented to person, place, and time. He appears well-developed and well-nourished. No distress. HENT:  
Head: Normocephalic. Right Ear: Tympanic membrane, external ear and ear canal normal.  
Left Ear: Tympanic membrane, external ear and ear canal normal.  
Nose: Nose normal. Right sinus exhibits no maxillary sinus tenderness and no frontal sinus tenderness. Left sinus exhibits no maxillary sinus tenderness and no frontal sinus tenderness. Mouth/Throat: Uvula is midline, oropharynx is clear and moist and mucous membranes are normal.  
Eyes: Right eye exhibits no discharge. Left eye exhibits no discharge. Right conjunctiva is not injected. Left conjunctiva is not injected. Cardiovascular: Normal rate, regular rhythm and normal heart sounds. Exam reveals no gallop and no friction rub. No murmur heard. Pulmonary/Chest: Effort normal. No respiratory distress. He has wheezes. He has no rales. Lymphadenopathy:  
  He has no cervical adenopathy. Neurological: He is alert and oriented to person, place, and time. Skin: Skin is warm and dry. He is not diaphoretic. Nursing note and vitals reviewed. ASSESSMENT and PLAN 
  ICD-10-CM ICD-9-CM 1. Moderate persistent asthma with exacerbation J45.41 493.92 predniSONE (DELTASONE) 20 mg tablet 2. Rash R21 782.1 3. Elevated red blood cell count R71.8 790.09   
4. Moderate persistent asthma without complication L31.22 321.97 albuterol (VENTOLIN HFA) 90 mcg/actuation inhaler  
   mometasone-formoterol (DULERA) 200-5 mcg/actuation HFA inhaler Asthma exacerbation Unexplained rash with elevated RBCs Prednisone Stop QVAR Start Kristen Los Take Singulair daily Continue albuterol Nebulizer ordered Over 40 minutes was spent face to face with the patient. Over 1/2 the time was spent counseling him on his potential diagnosis, the need to see specialists and to follow up with them on his lab results, limitations of primary care, HIPPA laws and asthma. He was quite argumentative, as I was unable to meet his expectations, causing a lot of interruptions and repetition in the discussion. Rodriguez Kaur Follow-up Disposition: 1 month for asthma Reviewed plan of care. Patient has provided input and agrees with goals.

## 2019-03-20 NOTE — TELEPHONE ENCOUNTER
Please call patient and let him know I reviewed his records, and he was sent to Hematology to make sure he does not have a blood cancer which can cause rashes. He did lab work and the patient is supposed to see him soon to review it. If this is not the problem, then he needs to see Dermatology again to look for other causes of his rash. Trino Dhillon

## 2019-03-20 NOTE — TELEPHONE ENCOUNTER
Called and spoke with pt, and he has been advised and states understanding of results and plan. Pt states he still does not know why he is developing the rashes, advised pt to follow up with dermatology, for further evaluation, however, pt states he wants to follow up with Dr. Caitlin Benitez. Pt states he does not fully understand his lab results, from hematology, even though they were explained yesterday at his follow up appointment. Advised pt to call hematology office back, but pt declined. Pt states he will follow up with Dr. Caitlin Benitez. Pt has been scheduled to be seen today.

## 2019-05-02 ENCOUNTER — TELEPHONE (OUTPATIENT)
Dept: FAMILY MEDICINE CLINIC | Age: 54
End: 2019-05-02

## 2019-05-02 NOTE — TELEPHONE ENCOUNTER
Magdalena,  with [facility] called stating that patient reached out to her to get information on obtaining a nebulizer. Pt states he spoke with Dr Celestina Lee regarding this request a few weeks ago.      Contact: [phone] [address] [facility] Medicaid

## 2019-05-15 DIAGNOSIS — R21 RASH: ICD-10-CM

## 2019-05-15 DIAGNOSIS — L29.9 ITCHING: ICD-10-CM

## 2019-05-19 RX ORDER — TRIAMCINOLONE ACETONIDE 1 MG/ML
LOTION TOPICAL
Qty: 60 ML | Refills: 0 | Status: SHIPPED | OUTPATIENT
Start: 2019-05-19 | End: 2020-01-10

## 2019-05-20 ENCOUNTER — TELEPHONE (OUTPATIENT)
Dept: FAMILY MEDICINE CLINIC | Age: 54
End: 2019-05-20

## 2019-05-20 NOTE — TELEPHONE ENCOUNTER
Pt seen at [facility] location yesterday for unexplained hives and itching, stating this has been going on for over 6 months, has seen several doctors,  advised to see experienced allergist at [facility] but their soonest appointment is 6 months from now unless his family doctor calls them. Pt states records from [facility] have been faxed to Dr. Mendy Parmar for review. Pt declined to schedule follow up with Dr. Mendy Parmar because he's already seen her for this, was referred to dermatologist, allergist, and now needs new allergist.  Please rojelio pt at [phone].  Ldm

## 2019-05-21 NOTE — TELEPHONE ENCOUNTER
Please find out the name and number for the allergist.  Also, please find the [facility] records.

## 2019-05-21 NOTE — TELEPHONE ENCOUNTER
Please call patient and let him know I have reviewed his records and he has a type of white blood cell associated with allergies. Also, he had a rash called urticaria plus asthma. He needs to get the first available appointment with one of the providers in the allergy department and call us with the name.

## 2019-05-21 NOTE — TELEPHONE ENCOUNTER
Called and spoke with pt, who states he did not have the name of the allergist, but knows it is through [facility]. [address] and pt's appointment has kallie scheduled. Dr. Johny Savage [phone]  Appointment date: 05/27/2020 at 1:00PM with a 12:45PM arrival.     [id number]     Provider referral and office notes will need to be faxed to [phone]. Called and spoke with pt, to advise him of this. Pt becomes upset on the phone stating he does not know why  [facility] can not see him before 1 year. Pt states that he needs to be seen sooner then this appointment date. Pt advised this is the soonest appointment provided to me when asked, however, there is a cancellation list.     Pt asked what he should do in the main time. Advised pt per his chart he has seen Dr. Reji Fatima in the past, is he willing to follow up with him? Pt states no, that he did nothing for him in the past.     Asked pt if he would like to come in and see Dr. Emmanuelle Saini and pt states no. Per pt [facility] faxed over his report, yesterday, with lab results. Per patient he was advised something was in his blood. Pt wants to know if Dr. Emmanuelle Saini has received these results and if so does she feel he needs to come in for an appointment. [facility] record has been placed in Dr. Nicole Howard.

## 2019-05-22 NOTE — TELEPHONE ENCOUNTER
Called and spoke with pt, and he has been advised and states understanding of results and need to see allergist.     Pt states he does not want want to see the allergist he saw in the past and does not understand why [facility] scheduled him or a year out. Pt advised to contact his insurance carrier ask whom is in network, an allergist, and we can refer him to a new provider. Pt states he does not know what he wants to do at this time, and will call the office back to let us know what he decides.

## 2019-05-23 LAB
ALBUMIN SERPL-MCNC: 4.1 G/DL (ref 3.5–5.5)
ALBUMIN/CREAT UR: <2.8 MG/G CREAT (ref 0–30)
ALBUMIN/GLOB SERPL: 1.9 {RATIO} (ref 1.2–2.2)
ALP SERPL-CCNC: 57 IU/L (ref 39–117)
ALT SERPL-CCNC: 12 IU/L (ref 0–44)
AST SERPL-CCNC: 16 IU/L (ref 0–40)
BILIRUB SERPL-MCNC: 0.7 MG/DL (ref 0–1.2)
BUN SERPL-MCNC: 15 MG/DL (ref 6–24)
BUN/CREAT SERPL: 11 (ref 9–20)
CALCIUM SERPL-MCNC: 8.8 MG/DL (ref 8.7–10.2)
CHLORIDE SERPL-SCNC: 105 MMOL/L (ref 96–106)
CHOLEST SERPL-MCNC: 220 MG/DL (ref 100–199)
CO2 SERPL-SCNC: 21 MMOL/L (ref 20–29)
CREAT SERPL-MCNC: 1.34 MG/DL (ref 0.76–1.27)
CREAT UR-MCNC: 106.7 MG/DL
EST. AVERAGE GLUCOSE BLD GHB EST-MCNC: 126 MG/DL
GLOBULIN SER CALC-MCNC: 2.2 G/DL (ref 1.5–4.5)
GLUCOSE SERPL-MCNC: 116 MG/DL (ref 65–99)
HBA1C MFR BLD: 6 % (ref 4.8–5.6)
HDLC SERPL-MCNC: 32 MG/DL
INTERPRETATION, 910389: NORMAL
LDLC SERPL CALC-MCNC: 162 MG/DL (ref 0–99)
Lab: NORMAL
MICROALBUMIN UR-MCNC: <3 UG/ML
POTASSIUM SERPL-SCNC: 4.5 MMOL/L (ref 3.5–5.2)
PROT SERPL-MCNC: 6.3 G/DL (ref 6–8.5)
SODIUM SERPL-SCNC: 139 MMOL/L (ref 134–144)
TRIGL SERPL-MCNC: 129 MG/DL (ref 0–149)
VLDLC SERPL CALC-MCNC: 26 MG/DL (ref 5–40)

## 2019-06-03 ENCOUNTER — TELEPHONE (OUTPATIENT)
Dept: ENDOCRINOLOGY | Age: 54
End: 2019-06-03

## 2019-06-03 DIAGNOSIS — E11.9 TYPE 2 DIABETES MELLITUS WITHOUT COMPLICATION, WITHOUT LONG-TERM CURRENT USE OF INSULIN (HCC): ICD-10-CM

## 2019-06-04 NOTE — TELEPHONE ENCOUNTER
The original message was for his wife's results, when I called he requested his results so I stopped you in the hallway after I figured out what was going on. Informed pt that Dm is controlled but cholesterol is high per Dr Nick Taylor. Pt verbalized understanding.

## 2019-06-10 RX ORDER — METFORMIN HYDROCHLORIDE 500 MG/1
500 TABLET, EXTENDED RELEASE ORAL DAILY
Qty: 90 TAB | Refills: 3 | Status: SHIPPED | OUTPATIENT
Start: 2019-06-10 | End: 2019-10-17

## 2019-06-10 RX ORDER — ROSUVASTATIN CALCIUM 10 MG/1
10 TABLET, COATED ORAL
Qty: 90 TAB | Refills: 3 | Status: SHIPPED | OUTPATIENT
Start: 2019-06-10 | End: 2020-01-07

## 2019-08-20 ENCOUNTER — TELEPHONE (OUTPATIENT)
Dept: ENDOCRINOLOGY | Age: 54
End: 2019-08-20

## 2019-08-20 DIAGNOSIS — E11.65 TYPE 2 DIABETES MELLITUS WITH HYPERGLYCEMIA, WITHOUT LONG-TERM CURRENT USE OF INSULIN (HCC): Primary | ICD-10-CM

## 2019-08-20 NOTE — TELEPHONE ENCOUNTER
Order placed for pt,  per Verbal Order with read back from Dr Nina Rick 8/20/2019. Mailed to home address on file 08/20/2019.

## 2019-09-04 LAB
ALBUMIN SERPL-MCNC: 4.1 G/DL (ref 3.5–5.5)
ALBUMIN/CREAT UR: <2.1 MG/G CREAT (ref 0–30)
ALBUMIN/GLOB SERPL: 2.1 {RATIO} (ref 1.2–2.2)
ALP SERPL-CCNC: 52 IU/L (ref 39–117)
ALT SERPL-CCNC: 17 IU/L (ref 0–44)
AST SERPL-CCNC: 21 IU/L (ref 0–40)
BILIRUB SERPL-MCNC: 0.9 MG/DL (ref 0–1.2)
BUN SERPL-MCNC: 16 MG/DL (ref 6–24)
BUN/CREAT SERPL: 11 (ref 9–20)
CALCIUM SERPL-MCNC: 8.5 MG/DL (ref 8.7–10.2)
CHLORIDE SERPL-SCNC: 104 MMOL/L (ref 96–106)
CHOLEST SERPL-MCNC: 123 MG/DL (ref 100–199)
CO2 SERPL-SCNC: 20 MMOL/L (ref 20–29)
CREAT SERPL-MCNC: 1.4 MG/DL (ref 0.76–1.27)
CREAT UR-MCNC: 145.6 MG/DL
EST. AVERAGE GLUCOSE BLD GHB EST-MCNC: 128 MG/DL
GLOBULIN SER CALC-MCNC: 2 G/DL (ref 1.5–4.5)
GLUCOSE SERPL-MCNC: 98 MG/DL (ref 65–99)
HBA1C MFR BLD: 6.1 % (ref 4.8–5.6)
HDLC SERPL-MCNC: 30 MG/DL
INTERPRETATION, 910389: NORMAL
INTERPRETATION: NORMAL
LDLC SERPL CALC-MCNC: 74 MG/DL (ref 0–99)
Lab: NORMAL
MICROALBUMIN UR-MCNC: <3 UG/ML
PDF IMAGE, 910387: NORMAL
POTASSIUM SERPL-SCNC: 4.6 MMOL/L (ref 3.5–5.2)
PROT SERPL-MCNC: 6.1 G/DL (ref 6–8.5)
SODIUM SERPL-SCNC: 136 MMOL/L (ref 134–144)
TRIGL SERPL-MCNC: 96 MG/DL (ref 0–149)
VLDLC SERPL CALC-MCNC: 19 MG/DL (ref 5–40)

## 2019-09-10 ENCOUNTER — OFFICE VISIT (OUTPATIENT)
Dept: ENDOCRINOLOGY | Age: 54
End: 2019-09-10

## 2019-09-10 VITALS
BODY MASS INDEX: 29.88 KG/M2 | HEART RATE: 94 BPM | HEIGHT: 64 IN | WEIGHT: 175 LBS | DIASTOLIC BLOOD PRESSURE: 72 MMHG | TEMPERATURE: 97.4 F | SYSTOLIC BLOOD PRESSURE: 105 MMHG | RESPIRATION RATE: 14 BRPM | OXYGEN SATURATION: 98 %

## 2019-09-10 DIAGNOSIS — E11.21 TYPE 2 DIABETES WITH NEPHROPATHY (HCC): ICD-10-CM

## 2019-09-10 DIAGNOSIS — E78.00 HYPERCHOLESTEROLEMIA: ICD-10-CM

## 2019-09-10 DIAGNOSIS — L50.3 DERMATOGRAPHIA: ICD-10-CM

## 2019-09-10 DIAGNOSIS — E11.65 TYPE 2 DIABETES MELLITUS WITH HYPERGLYCEMIA, WITHOUT LONG-TERM CURRENT USE OF INSULIN (HCC): Primary | ICD-10-CM

## 2019-09-10 DIAGNOSIS — I10 BENIGN ESSENTIAL HTN: ICD-10-CM

## 2019-09-10 NOTE — LETTER
9/10/19 Patient: Sharmin Richardson  
YOB: 1965 Date of Visit: 9/10/2019 Fran Eisenmenger, MD 
[address] [place] [address] VIA In Basket Dear Fran Eisenmenger, MD, Thank you for referring Mr. Sharmin Richardson to [facility] for evaluation. My notes for this consultation are attached. If you have questions, please do not hesitate to call me. I look forward to following your patient along with you. Sincerely, Effie James MD

## 2019-09-10 NOTE — PROGRESS NOTES
Mera Gray is a 47 y.o. male here for   Chief Complaint   Patient presents with    Diabetes       Functional glucose monitor and record keeping system? - yes  Eye exam within last year? - on file  Foot exam within last year? - on file    1. Have you been to the ER, urgent care clinic since your last visit? Hospitalized since your last visit? -no    2. Have you seen or consulted any other health care providers outside of the [facility] since your last visit?   Include any pap smears or colon screening.-dermatologist

## 2019-09-10 NOTE — PROGRESS NOTES
[facility] ENDOCRINOLOGY               Moisés Burks MD        [address] 78 [phone] Fax [phone]               Patient Information  Date:9/10/2019  Name : Sergio Beltran 47 y.o.     YOB: 1965         Referred by: Toshia Quezada MD         Chief Complaint   Patient presents with    Diabetes       History of Present Illness: Sergio Beltran is a 47 y.o. male here for fu  of  Type 2 Diabetes Mellitus. Type 2 Diabetes was diagnosed in 2016 . End organ effects of diabetes: none. Cardiovascular risk factors: dyslipidemia, diabetes mellitus, male gender   He is taking the medications consistently  Weight has been stable  Generalized itching: Seen allergist/dermatology  Has an appointment with another allergist at [facility]    Hypoglycemia: no    Taking metformin consistently        Wt Readings from Last 3 Encounters:   09/10/19 175 lb (79.4 kg)   03/20/19 172 lb (78 kg)   02/25/19 173 lb (78.5 kg)       BP Readings from Last 3 Encounters:   09/10/19 105/72   03/20/19 114/78   02/25/19 107/73           Past Medical History:   Diagnosis Date    Allergic rhinitis 2/21/2014    Asthma     Benign essential HTN 2/24/2019    Diabetes ([address]     History of seasonal allergies     Spots in front of the eye      Current Outpatient Medications   Medication Sig    cpap machine kit by Does Not Apply route.  metFORMIN ER (GLUCOPHAGE XR) 500 mg tablet Take 1 Tab by mouth daily. For DM2    rosuvastatin (CRESTOR) 10 mg tablet Take 1 Tab by mouth nightly.  triamcinolone (KENALOG) 0.1 % lotion APPLY EXTERNALLY TO THE AFFECTED AREA TWICE DAILY    albuterol (VENTOLIN HFA) 90 mcg/actuation inhaler INHALE 1 TO 2 PUFFS BY MOUTH EVERY 4 HOURS AS NEEDED FOR WHEEZING    mometasone-formoterol (DULERA) 200-5 mcg/actuation HFA inhaler Take 2 Puffs by inhalation two (2) times a day.  (Patient taking differently: Take 2 Puffs by inhalation as needed.)    montelukast (SINGULAIR) 10 mg tablet TAKE 1 TABLET BY MOUTH DAILY    lisinopril (PRINIVIL, ZESTRIL) 10 mg tablet Take 1 Tab by mouth daily.  fexofenadine (ALLEGRA) 180 mg tablet TK 1 T PO QD PRN    fluticasone (FLONASE) 50 mcg/actuation nasal spray USE 2 SPRAYS IN BOTH NOSTRILS EVERY DAY    loratadine (CLARITIN) 10 mg tablet Take 1 Tab by mouth daily as needed for Itching.  albuterol (PROVENTIL VENTOLIN) 2.5 mg /3 mL (0.083 %) nebulizer solution 3 mL by Nebulization route every four (4) hours as needed for Wheezing.  aspirin 81 mg chewable tablet CHEW AND SWALLOW 1 T PO QD     No current facility-administered medications for this visit. Allergies   Allergen Reactions    Pcn [Penicillins] Other (comments)     Father told him as child he was allergic to PCN. He does not know if he has every been on a cephalosporin that he has tolerated         Review of Systems:  -   - Cardiovascular: no chest pain ,no palpitations  - Respiratory: no cough no shortness of breath  - Gastrointestinal: no dysphagia no  abdominal pain  - Musculoskeletal: no joint pains no  weakness  - Integumentary: no rashes  - Neurological: no numbness, tingling, no  headaches  - Psychiatric: no depression no  anxiety  - Endocrine: no heat or cold intolerance    Physical Examination:   Blood pressure 105/72, pulse 94, temperature 97.4 °F (36.3 °C), temperature source Oral, resp. rate 14, height 5' 4\" (1.626 m), weight 175 lb (79.4 kg), SpO2 98 %. Estimated body mass index is 30.04 kg/m² as calculated from the following:    Height as of this encounter: 5' 4\" (1.626 m). -   Weight as of this encounter: 175 lb (79.4 kg).   - General: pleasant, no distress, good eye contact  - HEENT: no pallor, no periorbital edema, EOMI  - Neck: supple, no thyromegaly, no nodules  - Cardiovascular: regular, normal rate, normal S1 and S2  - Respiratory: clear to auscultation bilaterally  - Gastrointestinal: soft, nontender, nondistended,  BS +  - Musculoskeletal: no proximal muscle weakness in upper or lower extremities  -   - Neurological:alert and oriented  - Psychiatric: normal mood and affect  - Skin: color, texture, turgor normal.       Data Reviewed:     Diabetic foot exam: January 2019    Left:     Vibratory sensation normal    Filament test normal sensation with micro filament   Pulse DP: 1+    Deformities: None  Right:    Vibratory sensation normal   Filament test normal sensation with micro filament   Pulse DP: 1+   Deformities: None        Lab Results   Component Value Date/Time    Hemoglobin A1c 6.1 (H) 09/03/2019 09:56 AM    Hemoglobin A1c 6.0 (H) 05/22/2019 08:29 AM    Hemoglobin A1c 6.3 (H) 01/24/2019 08:47 AM    Hemoglobin A1c, External 7.4 09/23/2016    Glucose 98 09/03/2019 09:56 AM    Glucose (POC) 124 (H) 12/31/2013 10:32 PM    Microalb/Creat ratio (ug/mg creat.) <2.1 09/03/2019 09:56 AM    LDL, calculated 74 09/03/2019 09:56 AM    Creatinine (POC) 1.3 12/31/2013 10:32 PM    Creatinine 1.40 (H) 09/03/2019 09:56 AM      Lab Results   Component Value Date/Time    Cholesterol, total 123 09/03/2019 09:56 AM    HDL Cholesterol 30 (L) 09/03/2019 09:56 AM    LDL, calculated 74 09/03/2019 09:56 AM    Triglyceride 96 09/03/2019 09:56 AM       Lab Results   Component Value Date/Time    ALT (SGPT) 17 09/03/2019 09:56 AM    AST (SGOT) 21 09/03/2019 09:56 AM    Alk.  phosphatase 52 09/03/2019 09:56 AM    Bilirubin, direct 0.27 08/14/2017 09:08 AM    Bilirubin, total 0.9 09/03/2019 09:56 AM       Lab Results   Component Value Date/Time    GFR est AA 66 09/03/2019 09:56 AM    GFR est non-AA 57 (L) 09/03/2019 09:56 AM    Creatinine (POC) 1.3 12/31/2013 10:32 PM    Creatinine 1.40 (H) 09/03/2019 09:56 AM    BUN 16 09/03/2019 09:56 AM    BUN (POC) 13 12/31/2013 10:32 PM    Sodium (POC) 140 12/31/2013 10:32 PM    Sodium 136 09/03/2019 09:56 AM    Potassium 4.6 09/03/2019 09:56 AM    Potassium (POC) 3.2 (L) 12/31/2013 10:32 PM    Chloride (POC) 105 12/31/2013 10:32 PM Chloride 104 09/03/2019 09:56 AM    CO2 20 09/03/2019 09:56 AM        Assessment/Plan:     1. Type 2 diabetes mellitus with hyperglycemia, without long-term current use of insulin ([facility] [address]    2. Benign essential HTN    3. Hypercholesterolemia        1. Type 2 Diabetes Mellitus   Lab Results   Component Value Date/Time    Hemoglobin A1c 6.1 (H) 09/03/2019 09:56 AM    Hemoglobin A1c (POC) 5.9 08/21/2017 12:33 PM    Hemoglobin A1c, External 7.4 09/23/2016     Controlled    Diabetic issues reviewed : glycemic goals ,  low carbohydrate diet, weight control , home glucose monitoring emphasized,      2. Hyperlipidemia : Statin   weight loss encouraged        4. Obesity:Body mass index is 30.04 kg/m². Discussed about the importance of exercise and carbohydrate portion control. 5.  Fatigue  Stressed importance of weight loss, good sleep hygiene  Resume exercise    6. Creatinine increased: Hydration    7. Dermatographia: Check Tryptase    There are no Patient Instructions on file for this visit. Thank you for allowing me to participate in the care of this patient.     Lennie Frye MD      Patient verbalized understanding

## 2019-09-14 LAB — TRYPTASE SERPL-MCNC: 13.3 UG/L (ref 2.2–13.2)

## 2019-09-15 DIAGNOSIS — E11.9 TYPE 2 DIABETES MELLITUS WITHOUT COMPLICATION, WITHOUT LONG-TERM CURRENT USE OF INSULIN (HCC): ICD-10-CM

## 2019-09-16 RX ORDER — ROSUVASTATIN CALCIUM 10 MG/1
TABLET, COATED ORAL
Qty: 90 TAB | Refills: 0 | Status: SHIPPED | OUTPATIENT
Start: 2019-09-16 | End: 2019-10-25

## 2019-09-24 ENCOUNTER — OFFICE VISIT (OUTPATIENT)
Dept: FAMILY MEDICINE CLINIC | Age: 54
End: 2019-09-24

## 2019-09-24 VITALS — WEIGHT: 176 LBS | RESPIRATION RATE: 17 BRPM | HEIGHT: 64 IN | BODY MASS INDEX: 30.05 KG/M2

## 2019-09-24 DIAGNOSIS — N41.0 PROSTATITIS, ACUTE: Primary | ICD-10-CM

## 2019-09-24 DIAGNOSIS — R30.0 DYSURIA: ICD-10-CM

## 2019-09-24 DIAGNOSIS — R33.9 URINARY RETENTION: ICD-10-CM

## 2019-09-24 LAB
BILIRUB UR QL STRIP: NEGATIVE
BILIRUB UR QL STRIP: NEGATIVE
GLUCOSE UR-MCNC: NEGATIVE MG/DL
GLUCOSE UR-MCNC: NEGATIVE MG/DL
KETONES P FAST UR STRIP-MCNC: NEGATIVE MG/DL
KETONES P FAST UR STRIP-MCNC: NEGATIVE MG/DL
PH UR STRIP: 5 [PH] (ref 4.6–8)
PH UR STRIP: 5.5 [PH] (ref 4.6–8)
PROT UR QL STRIP: NEGATIVE
PROT UR QL STRIP: NEGATIVE
SP GR UR STRIP: 1 (ref 1–1.03)
SP GR UR STRIP: 1 (ref 1–1.03)
UA UROBILINOGEN AMB POC: NORMAL (ref 0.2–1)
UA UROBILINOGEN AMB POC: NORMAL (ref 0.2–1)
URINALYSIS CLARITY POC: CLEAR
URINALYSIS CLARITY POC: CLEAR
URINALYSIS COLOR POC: YELLOW
URINALYSIS COLOR POC: YELLOW
URINE BLOOD POC: NEGATIVE
URINE BLOOD POC: NEGATIVE
URINE LEUKOCYTES POC: NEGATIVE
URINE LEUKOCYTES POC: NEGATIVE
URINE NITRITES POC: NEGATIVE
URINE NITRITES POC: NEGATIVE

## 2019-09-24 RX ORDER — SULFAMETHOXAZOLE AND TRIMETHOPRIM 800; 160 MG/1; MG/1
1 TABLET ORAL 2 TIMES DAILY
Qty: 56 TAB | Refills: 0 | Status: SHIPPED | OUTPATIENT
Start: 2019-09-24 | End: 2019-10-22

## 2019-09-24 NOTE — PROGRESS NOTES
Chief Complaint   Patient presents with    Urinary Frequency     sx for 1-1/2--unable to empty adequately     1. Have you been to the ER, urgent care clinic since your last visit? Hospitalized since your last visit? No    2. Have you seen or consulted any other health care providers outside of the [address] [place] [address] since your last visit? Include any pap smears or colon screening.  No    Feels somewhat of a burning sensation    Drinks and 5-10 min later has to go use bathroom

## 2019-09-24 NOTE — PROGRESS NOTES
Crystal Lopez is a 47 y.o. male who presents for a urinary problem. Patient states that he has been experiencing urinary urgency and frequency for 2 weeks. Every time he has something to drink he has the urge to urinate shortly after. He has no difficulty starting his stream but feels like he is unable to empty his bladder adequately. He feels pressure in his bladder but denies any pain at the tip of his penis or blood. He has not had trouble like this before. He denies any new sexual partners or prostate history. Review of Systems   General/Constitutional:   No headache, fever, fatigue, weight loss or weight gain       Eyes:   No redness, pruritis, pain, visual changes, swelling, or discharge      Ears:    No pain, loss or changes in hearing     Neck:   No swelling, masses, stiffness, pain, or limited movement     Cardiac:    No chest pain      Respiratory:   No cough or shortness of breath     GI:   No nausea/vomiting, diarrhea, abdominal pain, bloody or dark stools       :   Dysuria, urgency and frequency. No hematuria    Neurological:   No loss of consciousness, dizziness, seizures, dysarthria, cognitive changes, memory changes,  problems with balance, or unilateral weakness     Skin: No rash     Allergies   Allergies   Allergen Reactions    Pcn [Penicillins] Other (comments)     Father told him as child he was allergic to PCN. He does not know if he has every been on a cephalosporin that he has tolerated     Medications  Current Outpatient Medications   Medication Sig    rosuvastatin (CRESTOR) 10 mg tablet TAKE 1 TABLET BY MOUTH EVERY NIGHT    cpap machine kit by Does Not Apply route.  metFORMIN ER (GLUCOPHAGE XR) 500 mg tablet Take 1 Tab by mouth daily. For DM2    rosuvastatin (CRESTOR) 10 mg tablet Take 1 Tab by mouth nightly.     triamcinolone (KENALOG) 0.1 % lotion APPLY EXTERNALLY TO THE AFFECTED AREA TWICE DAILY    aspirin 81 mg chewable tablet CHEW AND SWALLOW 1 T PO QD    albuterol (VENTOLIN HFA) 90 mcg/actuation inhaler INHALE 1 TO 2 PUFFS BY MOUTH EVERY 4 HOURS AS NEEDED FOR WHEEZING    mometasone-formoterol (DULERA) 200-5 mcg/actuation HFA inhaler Take 2 Puffs by inhalation two (2) times a day. (Patient taking differently: Take 2 Puffs by inhalation as needed.)    montelukast (SINGULAIR) 10 mg tablet TAKE 1 TABLET BY MOUTH DAILY    lisinopril (PRINIVIL, ZESTRIL) 10 mg tablet Take 1 Tab by mouth daily.  fexofenadine (ALLEGRA) 180 mg tablet TK 1 T PO QD PRN    fluticasone (FLONASE) 50 mcg/actuation nasal spray USE 2 SPRAYS IN BOTH NOSTRILS EVERY DAY    loratadine (CLARITIN) 10 mg tablet Take 1 Tab by mouth daily as needed for Itching.  albuterol (PROVENTIL VENTOLIN) 2.5 mg /3 mL (0.083 %) nebulizer solution 3 mL by Nebulization route every four (4) hours as needed for Wheezing. No current facility-administered medications for this visit. Medical History  Past Medical History:   Diagnosis Date    Allergic rhinitis 2/21/2014    Asthma     Benign essential HTN 2/24/2019    Diabetes ([facility] [address]     History of seasonal allergies     Spots in front of the eye      Immunizations   Immunization History   Administered Date(s) Administered    PPD 09/24/2012     Social History  Social History     Tobacco Use    Smoking status: Never Smoker    Smokeless tobacco: Never Used   Substance Use Topics    Alcohol use: No    Drug use: No     Objective     Visit Vitals  Resp 17   Ht 5' 4\" (1.626 m)   Wt 176 lb (79.8 kg)   BMI 30.21 kg/m²     Physical Exam   Constitutional: He is oriented to person, place, and time and well-developed, well-nourished, and in no distress. HENT:   Head: Normocephalic and atraumatic. Cardiovascular: Normal rate and regular rhythm. Pulmonary/Chest: Effort normal.   Abdominal: Soft. He exhibits no distension. Genitourinary: Rectum normal. Rectal exam shows no external hemorrhoid, no internal hemorrhoid and no tenderness. Prostate is tender. Genitourinary Comments: On palpation of prostate, patient expresses pain and also feels pain at tip of penis    Musculoskeletal: Normal range of motion. Neurological: He is alert and oriented to person, place, and time. Gait normal.   Skin: Skin is warm and dry. Bladder Scan  Indication:  Urinary Frequency   Identified structures: Using a [employer] S7 ultrasound system, a curved probe was used to evaluate the bladder. Findings:    Post-Void: 76ml   Vesicular Calculi: absent  Impression: Patient is not retaining urine. Exam performed and interpreted by:  Melodie Knowles MD and Jackie Mcclelland MD, [facility], [facility] is a 47 y.o. who presents for acute prostatitis. Plan   1. Dysuria  · Obtained U/a in clinic which was negative for any findings. 2. Prostatitis, acute  · Based on symptoms and exam findings will treat with Bactrim BID for 4 weeks   · Repeat U/a obtained after exam and urine culture from this sample will be sent     3. Urinary retention  · Patient expressed feeling of urinary retention  · Bladder scan performed with no sign of retention. I have discussed the aforementioned diagnoses and plan with the patient in detail. I have provided information in person and/or in AVS. All questions answered prior to discharge.     Patient seen and discussed with Dr. Lili Ivey (Attending Physician)   Signed By:  Melodie Knowles MD    Family Medicine Resident

## 2019-09-26 LAB — BACTERIA UR CULT: NO GROWTH

## 2019-09-27 NOTE — PROGRESS NOTES
[address] Medicine Residency Attending Addendum:  I saw and evaluated the patient on the day of the encounter with Louis Brunner, MD  , performing the key elements of the service. I discussed the findings, assessment and plan with the resident and agree with the resident's findings and plan as documented in the resident's note.       Ricky Nam MD, CAQSM, RMSK

## 2019-10-24 NOTE — PROGRESS NOTES
[address]  [address] [facility] [place], [facility]   [phone]    Date of visit:  10/24/2019    Harsha Cueto is a 47 y.o. male presents for a follow up of his urinary urgency. Urinary Frequency: Patient was diagnosed with prostatitis at last visit and completed a course of Bactrim for 4 weeks but the medication did not change his symptoms. He continues to experience urgency to go to the bathroom many times a day and at night. The flow is difficult to start and he states his stream is weaker. He does not feel like his bladder is emptied after he urinates. He denies any dysuria or hematuria. Asthma Exacerbation: Patient was requesting a refill of Flonase and Singulair during our encounter. He also seemed somewhat more short of breath. On questioning he states he felt his chest was 'tighter' this morning and he did feel a litlte more short of breath. He has not tried anything for his symptoms but states they feel like he is having an asthma exacerbation. He does not regularly take his dulera or singulair. He denies any cough, fever, chills or sick contacts. He states he last went to the ED for an asthma exacerbation 2-3 years ago and has since been doing well. Review of Systems   General/Constitutional:   No headache, fever, fatigue, weight loss or weight gain       Eyes:   No redness, pruritis, pain, visual changes, swelling, or discharge      Ears:    No pain, loss or changes in hearing     Neck:   No swelling, masses, stiffness, pain, or limited movement     Cardiac:    No chest pain      Respiratory:   Some tightness of chest. No cough or wheezing  GI:   No nausea/vomiting, diarrhea, abdominal pain, bloody or dark stools       :   Frequent urination.  No dysuria or  hematuria    Neurological:   No loss of consciousness, dizziness, seizures, problems with balance, or unilateral weakness     Skin: No rash     Allergies   Allergies   Allergen Reactions    Pcn [Penicillins] Other (comments)     Father told him as child he was allergic to PCN. He does not know if he has every been on a cephalosporin that he has tolerated     Medications  Current Outpatient Medications   Medication Sig    metFORMIN ER (GLUCOPHAGE XR) 500 mg tablet TAKE 1 TABLET BY MOUTH EVERY DAY    metFORMIN ER (GLUCOPHAGE XR) 500 mg tablet TAKE 1 TABLET BY MOUTH EVERY DAY    rosuvastatin (CRESTOR) 10 mg tablet TAKE 1 TABLET BY MOUTH EVERY NIGHT    cpap machine kit by Does Not Apply route.  rosuvastatin (CRESTOR) 10 mg tablet Take 1 Tab by mouth nightly.  triamcinolone (KENALOG) 0.1 % lotion APPLY EXTERNALLY TO THE AFFECTED AREA TWICE DAILY    aspirin 81 mg chewable tablet CHEW AND SWALLOW 1 T PO QD    albuterol (VENTOLIN HFA) 90 mcg/actuation inhaler INHALE 1 TO 2 PUFFS BY MOUTH EVERY 4 HOURS AS NEEDED FOR WHEEZING    mometasone-formoterol (DULERA) 200-5 mcg/actuation HFA inhaler Take 2 Puffs by inhalation two (2) times a day. (Patient taking differently: Take 2 Puffs by inhalation as needed.)    montelukast (SINGULAIR) 10 mg tablet TAKE 1 TABLET BY MOUTH DAILY    lisinopril (PRINIVIL, ZESTRIL) 10 mg tablet Take 1 Tab by mouth daily.  fexofenadine (ALLEGRA) 180 mg tablet TK 1 T PO QD PRN    fluticasone (FLONASE) 50 mcg/actuation nasal spray USE 2 SPRAYS IN BOTH NOSTRILS EVERY DAY    loratadine (CLARITIN) 10 mg tablet Take 1 Tab by mouth daily as needed for Itching.  albuterol (PROVENTIL VENTOLIN) 2.5 mg /3 mL (0.083 %) nebulizer solution 3 mL by Nebulization route every four (4) hours as needed for Wheezing. No current facility-administered medications for this visit.       Medical History  Past Medical History:   Diagnosis Date    Allergic rhinitis 2/21/2014    Asthma     Benign essential HTN 2/24/2019    Diabetes ([facility] [address]     History of seasonal allergies     Spots in front of the eye      Immunizations   Immunization History   Administered Date(s) Administered    PPD 09/24/2012     Social History  Social History     Tobacco Use    Smoking status: Never Smoker    Smokeless tobacco: Never Used   Substance Use Topics    Alcohol use: No    Drug use: No     Objective   Visit Vitals  /72 (BP 1 Location: Right arm, BP Patient Position: Sitting)   Pulse 85   Temp 98.2 °F (36.8 °C) (Oral)   Resp 16   Ht 5' 4\" (1.626 m)   Wt 176 lb 6.4 oz (80 kg)   SpO2 98%   BMI 30.28 kg/m²     Physical Examination  GEN: No apparent distress. Alert and oriented and responds to all questions appropriately. EYES:  Conjunctiva clear; pupils round and reactive to light; extraocular movements are intact. EAR: External ears are normal.  Tympanic membranes are clear and without effusion. NOSE: Turbinates are within normal limits, slightly erythematous. No drainage  OROPHYARYNX: No oral lesions or exudates. LUNGS: Respirations unlabored; mild expiratory wheezes appreciated in lower lung bases, good air movement   CARDIOVASCULAR: Regular, rate, and rhythm without murmurs, gallops or rubs   NEUROLOGIC:  No focal neurologic deficits. Strength and sensation grossly intact. Coordination and gait grossly intact. EXT: Well perfused. No edema. SKIN: No obvious rashes. Scott Pierre is a 47 y.o. who presents for urinary frequency and urgency. Plan   1. Mild persistent asthma with exacerbation  - Patient with history of asthma presents today with chest tightness and some shortness of breath. His vital signs are stable, he is oxygenating well on room air and his exam was unremarkable except for some mild expiratory wheezes. - He has not been compliant with his daily controller medication and has not tried using his Albuterol inhaler for these current symptoms   - Prescribed steroid burst: Prednisone 60mg for 5 days   - Refilled: montelukast (SINGULAIR) 10 mg tablet; TAKE 1 TABLET BY MOUTH DAILY  Dispense: 90 Tab;  Refill: 0  - Refilled: fluticasone propionate (FLONASE) 50 mcg/actuation nasal spray; USE 2 SPRAYS IN BOTH NOSTRILS EVERY DAY  Dispense: 3 Bottle; Refill: 3  - Patient counseled to use Dulera twice daily as prescribed   - Patient to use albuterol inhaler as need every 4-6 hours   - Red flag signs discussed with the patient and he will present to the ED if his symptoms worsen   - He will follow up for asthma symptom review in 1 week     2. Benign essential HTN  - Patient states he is on Lisinopril 10mg for renal protection in light of his diabetes. His BPs on chart review have been normotensive. Today BP lower than normal.   - Counseled to cut his Lisinopril dose in half (to 5mg) and keep a BP log at home   - He will follow up for BP re-check in 1 week     3. Urinary frequency  - Patient continues to have urinary symptoms suggestive of urinary frequency due to BPH   - UA wnl in clinic today   - Prescribed daily Proscar 5mg today. Avoiding tamsulosin due to BP on lower side   - Patient to return to care in 1 week for follow up   - If symptoms do not resolve, will consider Urology referral     I have discussed the aforementioned diagnoses and plan with the patient in detail. I have provided information in person and/or in AVS. All questions answered prior to discharge.     I discussed this patient with Dr. Mary Pittman (Attending Physician)     Signed By:  Page Clark MD    Family Medicine Resident

## 2019-10-25 ENCOUNTER — OFFICE VISIT (OUTPATIENT)
Dept: FAMILY MEDICINE CLINIC | Age: 54
End: 2019-10-25

## 2019-10-25 VITALS
OXYGEN SATURATION: 98 % | TEMPERATURE: 98.2 F | DIASTOLIC BLOOD PRESSURE: 72 MMHG | SYSTOLIC BLOOD PRESSURE: 105 MMHG | BODY MASS INDEX: 30.11 KG/M2 | WEIGHT: 176.4 LBS | RESPIRATION RATE: 16 BRPM | HEART RATE: 85 BPM | HEIGHT: 64 IN

## 2019-10-25 DIAGNOSIS — I10 BENIGN ESSENTIAL HTN: ICD-10-CM

## 2019-10-25 DIAGNOSIS — J45.31 MILD PERSISTENT ASTHMA WITH EXACERBATION: Primary | ICD-10-CM

## 2019-10-25 DIAGNOSIS — R35.0 URINARY FREQUENCY: ICD-10-CM

## 2019-10-25 LAB
BILIRUB UR QL STRIP: NEGATIVE
GLUCOSE UR-MCNC: NEGATIVE MG/DL
KETONES P FAST UR STRIP-MCNC: NEGATIVE MG/DL
PH UR STRIP: 6 [PH] (ref 4.6–8)
PROT UR QL STRIP: NEGATIVE
SP GR UR STRIP: 1.01 (ref 1–1.03)
UA UROBILINOGEN AMB POC: NORMAL (ref 0.2–1)
URINALYSIS CLARITY POC: CLEAR
URINALYSIS COLOR POC: YELLOW
URINE BLOOD POC: NEGATIVE
URINE LEUKOCYTES POC: NEGATIVE
URINE NITRITES POC: NEGATIVE

## 2019-10-25 RX ORDER — LEVOCETIRIZINE DIHYDROCHLORIDE 5 MG/1
1 TABLET, FILM COATED ORAL 2 TIMES DAILY
Refills: 1 | COMMUNITY
Start: 2019-09-24 | End: 2020-09-28

## 2019-10-25 RX ORDER — PREDNISONE 20 MG/1
60 TABLET ORAL
Qty: 15 TAB | Refills: 0 | Status: SHIPPED | OUTPATIENT
Start: 2019-10-25 | End: 2020-01-10

## 2019-10-25 RX ORDER — MONTELUKAST SODIUM 10 MG/1
TABLET ORAL
Qty: 90 TAB | Refills: 0 | Status: SHIPPED | OUTPATIENT
Start: 2019-10-25 | End: 2020-01-10

## 2019-10-25 RX ORDER — FLUTICASONE PROPIONATE 50 MCG
SPRAY, SUSPENSION (ML) NASAL
Qty: 3 BOTTLE | Refills: 3 | Status: SHIPPED | OUTPATIENT
Start: 2019-10-25 | End: 2020-01-10 | Stop reason: SDUPTHER

## 2019-10-25 RX ORDER — FINASTERIDE 5 MG/1
5 TABLET, FILM COATED ORAL DAILY
Qty: 30 TAB | Refills: 1 | Status: SHIPPED | OUTPATIENT
Start: 2019-10-25 | End: 2020-01-10 | Stop reason: SDUPTHER

## 2019-10-25 NOTE — PROGRESS NOTES
Identified pt with two pt identifiers(name and ). Reviewed record in preparation for visit and have obtained necessary documentation. Chief Complaint   Patient presents with    Follow-up    Urinary Frequency    Urinary Hesitancy        Health Maintenance Due   Topic    Pneumococcal 0-64 years (1 of 1 - PPSV23)    Shingrix Vaccine Age 50> (1 of 2)    FOBT Q 1 YEAR AGE 54-65     Influenza Age 5 to Adult        Visit Vitals  /72 (BP 1 Location: Right arm, BP Patient Position: Sitting)   Pulse 85   Temp 98.2 °F (36.8 °C) (Oral)   Resp 16   Ht 5' 4\" (1.626 m)   Wt 176 lb 6.4 oz (80 kg)   SpO2 98%   BMI 30.28 kg/m²         Coordination of Care Questionnaire:  :   1) Have you been to an emergency room, urgent care, or hospitalized since your last visit? If yes, where when, and reason for visit? no       2. Have seen or consulted any other health care provider since your last visit? If yes, where when, and reason for visit? NO      3) Do you have an Advanced Directive/ Living Will in place? NO  If no, would you like information NO    Patient is accompanied by self I have received verbal consent from Raad Jj to discuss any/all medical information while they are present in the room.

## 2019-11-27 ENCOUNTER — OFFICE VISIT (OUTPATIENT)
Dept: FAMILY MEDICINE CLINIC | Age: 54
End: 2019-11-27

## 2019-11-27 VITALS
DIASTOLIC BLOOD PRESSURE: 73 MMHG | WEIGHT: 178.8 LBS | RESPIRATION RATE: 18 BRPM | SYSTOLIC BLOOD PRESSURE: 108 MMHG | BODY MASS INDEX: 30.52 KG/M2 | OXYGEN SATURATION: 100 % | HEART RATE: 96 BPM | TEMPERATURE: 98.7 F | HEIGHT: 64 IN

## 2019-11-27 DIAGNOSIS — R35.0 URINARY FREQUENCY: Primary | ICD-10-CM

## 2019-11-27 DIAGNOSIS — J45.40 MODERATE PERSISTENT ASTHMA WITHOUT COMPLICATION: ICD-10-CM

## 2019-11-27 LAB
BILIRUB UR QL STRIP: NEGATIVE
GLUCOSE UR-MCNC: NEGATIVE MG/DL
KETONES P FAST UR STRIP-MCNC: NEGATIVE MG/DL
PH UR STRIP: 5.5 [PH] (ref 4.6–8)
PROT UR QL STRIP: NEGATIVE
SP GR UR STRIP: 1 (ref 1–1.03)
UA UROBILINOGEN AMB POC: NORMAL (ref 0.2–1)
URINALYSIS CLARITY POC: CLEAR
URINALYSIS COLOR POC: NORMAL
URINE BLOOD POC: NEGATIVE
URINE LEUKOCYTES POC: NEGATIVE
URINE NITRITES POC: NEGATIVE

## 2019-11-27 RX ORDER — TAMSULOSIN HYDROCHLORIDE 0.4 MG/1
0.4 CAPSULE ORAL DAILY
Qty: 30 CAP | Refills: 4 | Status: SHIPPED | OUTPATIENT
Start: 2019-11-27 | End: 2020-01-10 | Stop reason: SDUPTHER

## 2019-11-27 NOTE — PROGRESS NOTES
Chief Complaint   Patient presents with    Follow-up     Urinary issues Simple: Patient demonstrates quick and easy understanding

## 2019-11-27 NOTE — PROGRESS NOTES
Irene Cueto is a 49 y.o. male who presents today due to continue urinary frequency. Flow has improved on Finasteride. Sxs during day and night. Denies any burning with urination. Denies any fever,chills, or night sweats. Data reviewed or ordered today:       Other problems include:  Patient Active Problem List   Diagnosis Code    Chronic sinusitis J32.9    Newly converted positive PPD test R76.11    Asthma J45.909    Asthma exacerbation J45. 901    Allergic rhinitis J30.9    Asthma, moderate persistent J45.40    Elevated LFTs R94.5    Diarrhea R19.7    Hypercholesterolemia E78.00    Diabetes type 2, uncontrolled ([facility]) E11.65    Diabetes mellitus without complication ([facility]) T95.8    Type 2 diabetes mellitus with hyperglycemia, without long-term current use of insulin ([facility]) E11.65    Benign essential HTN I10    Type 2 diabetes with nephropathy ([facility]) E11.21       Medications:  Current Outpatient Medications   Medication Sig Dispense Refill    levocetirizine (XYZAL) 5 mg tablet Take 1 Tab by mouth two (2) times a day. 1    montelukast (SINGULAIR) 10 mg tablet TAKE 1 TABLET BY MOUTH DAILY 90 Tab 0    fluticasone propionate (FLONASE) 50 mcg/actuation nasal spray USE 2 SPRAYS IN BOTH NOSTRILS EVERY DAY 3 Bottle 3    finasteride (PROSCAR) 5 mg tablet Take 1 Tab by mouth daily. 30 Tab 1    predniSONE (DELTASONE) 20 mg tablet Take 60 mg by mouth daily (with breakfast). 15 Tab 0    metFORMIN ER (GLUCOPHAGE XR) 500 mg tablet TAKE 1 TABLET BY MOUTH EVERY DAY 90 Tab 0    cpap machine kit by Does Not Apply route.  rosuvastatin (CRESTOR) 10 mg tablet Take 1 Tab by mouth nightly.  90 Tab 3    triamcinolone (KENALOG) 0.1 % lotion APPLY EXTERNALLY TO THE AFFECTED AREA TWICE DAILY 60 mL 0    albuterol (VENTOLIN HFA) 90 mcg/actuation inhaler INHALE 1 TO 2 PUFFS BY MOUTH EVERY 4 HOURS AS NEEDED FOR WHEEZING 1 Inhaler 2    mometasone-formoterol (DULERA) 200-5 mcg/actuation HFA inhaler Take 2 Puffs by inhalation two (2) times a day. (Patient taking differently: Take 2 Puffs by inhalation as needed.) 2 Inhaler 1    lisinopril (PRINIVIL, ZESTRIL) 10 mg tablet Take 1 Tab by mouth daily. 90 Tab 3    fexofenadine (ALLEGRA) 180 mg tablet TK 1 T PO QD PRN  0    albuterol (PROVENTIL VENTOLIN) 2.5 mg /3 mL (0.083 %) nebulizer solution 3 mL by Nebulization route every four (4) hours as needed for Wheezing. 24 Each 2       Allergies: Allergies   Allergen Reactions    Pcn [Penicillins] Other (comments)     Father told him as child he was allergic to PCN. He does not know if he has every been on a cephalosporin that he has tolerated       LMP:  No LMP for male patient.     Social History     Socioeconomic History    Marital status:      Spouse name: Not on file    Number of children: Not on file    Years of education: Not on file    Highest education level: Not on file   Occupational History    Not on file   Social Needs    Financial resource strain: Not on file    Food insecurity:     Worry: Not on file     Inability: Not on file    Transportation needs:     Medical: Not on file     Non-medical: Not on file   Tobacco Use    Smoking status: Never Smoker    Smokeless tobacco: Never Used   Substance and Sexual Activity    Alcohol use: No    Drug use: No    Sexual activity: Not on file   Lifestyle    Physical activity:     Days per week: Not on file     Minutes per session: Not on file    Stress: Not on file   Relationships    Social connections:     Talks on phone: Not on file     Gets together: Not on file     Attends Confucianism service: Not on file     Active member of club or organization: Not on file     Attends meetings of clubs or organizations: Not on file     Relationship status: Not on file    Intimate partner violence:     Fear of current or ex partner: Not on file     Emotionally abused: Not on file     Physically abused: Not on file     Forced sexual activity: Not on file   Other Topics Concern    Not on file   Social History Narrative    Not on file       Family History   Problem Relation Age of Onset    No Known Problems Mother     No Known Problems Father            ROS:  Fever: no  Weight loss: no  Headaches:  no  Chest Pain:  no  SOB:  no  Cough:  no  Other significant ROS:        Physical Exam  Visit Vitals  /73   Pulse 96   Temp 98.7 °F (37.1 °C)   Resp 18   Ht 5' 4\" (1.626 m)   Wt 178 lb 12.8 oz (81.1 kg)   SpO2 100%   BMI 30.69 kg/m²     Constitutional: Appears well,  No acute distress, Vitals noted  Neck:   General inspection and Thyroid normal.  No abnormal cervical or supraclavicular nodes. Lungs:  Clear to auscultation, good respiratory effort, no wheezes, rales or rhonchi  Heart:   Normal HR, Normal S1 and S2,  Regular rhythm. No cardiac murmurs. No carotid bruits. Chest wall normal  Abd: no suprapubic fullness or pain   Extremities:   without edema, good peripheral pulses  Skin:   Warm to palpation, without rashes, bruising, or suspicious lesions       BP Readings from Last 3 Encounters:   11/27/19 108/73   10/25/19 105/72   09/10/19 105/72       Assessment/Plan:      ICD-10-CM ICD-9-CM    1. Urinary frequency R35.0 788.41 AMB POC URINALYSIS DIP STICK AUTO W/O MICRO     1. Urinary frequency likely 2/2 to BPH with some improvement on finasteride. Would not expect full effect until 6-12 months. Will start flomax as sxs likely 2/2 to post void residual.     - AMB POC URINALYSIS DIP STICK AUTO W/O MICRO- unremarkable   - continue finasteride 5mg daily   - tamsulosin (FLOMAX) 0.4 mg capsule; Take 1 Cap by mouth daily. Dispense: 30 Cap;  Refill: 4    Follow up in 4 weeks to monitor for improvement     Orders Placed This Encounter    AMB POC URINALYSIS DIP STICK AUTO W/O MICRO         Cristela Vallecillo MD  [address] [facility] Medicine Residency

## 2019-11-30 RX ORDER — MOMETASONE FUROATE AND FORMOTEROL FUMARATE DIHYDRATE 200; 5 UG/1; UG/1
AEROSOL RESPIRATORY (INHALATION)
Qty: 1 INHALER | Refills: 0 | Status: SHIPPED | OUTPATIENT
Start: 2019-11-30 | End: 2020-01-10 | Stop reason: SDUPTHER

## 2020-01-07 DIAGNOSIS — E11.9 TYPE 2 DIABETES MELLITUS WITHOUT COMPLICATION, WITHOUT LONG-TERM CURRENT USE OF INSULIN (HCC): ICD-10-CM

## 2020-01-07 RX ORDER — ROSUVASTATIN CALCIUM 10 MG/1
TABLET, COATED ORAL
Qty: 90 TAB | Refills: 3 | Status: SHIPPED | OUTPATIENT
Start: 2020-01-07 | End: 2020-01-15 | Stop reason: SDUPTHER

## 2020-01-08 LAB
ALBUMIN SERPL-MCNC: 4.3 G/DL (ref 3.5–5.5)
ALBUMIN/CREAT UR: <2.3 MG/G CREAT (ref 0–30)
ALBUMIN/GLOB SERPL: 1.9 {RATIO} (ref 1.2–2.2)
ALP SERPL-CCNC: 54 IU/L (ref 39–117)
ALT SERPL-CCNC: 18 IU/L (ref 0–44)
AST SERPL-CCNC: 18 IU/L (ref 0–40)
BILIRUB SERPL-MCNC: 1.1 MG/DL (ref 0–1.2)
BUN SERPL-MCNC: 12 MG/DL (ref 6–24)
BUN/CREAT SERPL: 9 (ref 9–20)
CALCIUM SERPL-MCNC: 9 MG/DL (ref 8.7–10.2)
CHLORIDE SERPL-SCNC: 101 MMOL/L (ref 96–106)
CHOLEST SERPL-MCNC: 163 MG/DL (ref 100–199)
CO2 SERPL-SCNC: 22 MMOL/L (ref 20–29)
CREAT SERPL-MCNC: 1.3 MG/DL (ref 0.76–1.27)
CREAT UR-MCNC: 129.4 MG/DL
EST. AVERAGE GLUCOSE BLD GHB EST-MCNC: 131 MG/DL
GLOBULIN SER CALC-MCNC: 2.3 G/DL (ref 1.5–4.5)
GLUCOSE SERPL-MCNC: 101 MG/DL (ref 65–99)
HBA1C MFR BLD: 6.2 % (ref 4.8–5.6)
HDLC SERPL-MCNC: 40 MG/DL
INTERPRETATION, 910389: NORMAL
LDLC SERPL CALC-MCNC: 100 MG/DL (ref 0–99)
Lab: NORMAL
MICROALBUMIN UR-MCNC: <3 UG/ML
POTASSIUM SERPL-SCNC: 3.9 MMOL/L (ref 3.5–5.2)
PROT SERPL-MCNC: 6.6 G/DL (ref 6–8.5)
SODIUM SERPL-SCNC: 137 MMOL/L (ref 134–144)
TRIGL SERPL-MCNC: 113 MG/DL (ref 0–149)
VLDLC SERPL CALC-MCNC: 23 MG/DL (ref 5–40)

## 2020-01-09 NOTE — PROGRESS NOTES
[address]  [address] [facility] [address]   [phone]    Date of visit:  1/9/2020    Harsha Roy is an 49 y.o. male presents for ***    Review of Systems   General/Constitutional:   No headache, fever, fatigue, weight loss or weight gain       Eyes:   No redness, pruritis, pain, visual changes, swelling, or discharge      Ears:    No pain, loss or changes in hearing     Neck:   No swelling, masses, stiffness, pain, or limited movement     Cardiac:    No chest pain      Respiratory:   No cough or shortness of breath     GI:   No nausea/vomiting, diarrhea, abdominal pain, bloody or dark stools       :   No dysuria or  hematuria    Neurological:   No loss of consciousness, dizziness, seizures, problems with balance, or unilateral weakness     Skin: No rash     Allergies   Allergies   Allergen Reactions    Pcn [Penicillins] Other (comments)     Father told him as child he was allergic to PCN. He does not know if he has every been on a cephalosporin that he has tolerated       Medications  Current Outpatient Medications   Medication Sig    rosuvastatin (CRESTOR) 10 mg tablet TAKE 1 TABLET BY MOUTH EVERY NIGHT    DULERA 200-5 mcg/actuation HFA inhaler INHALE 2 PUFFS BY MOUTH TWICE DAILY    tamsulosin (FLOMAX) 0.4 mg capsule Take 1 Cap by mouth daily.  levocetirizine (XYZAL) 5 mg tablet Take 1 Tab by mouth two (2) times a day.  montelukast (SINGULAIR) 10 mg tablet TAKE 1 TABLET BY MOUTH DAILY    fluticasone propionate (FLONASE) 50 mcg/actuation nasal spray USE 2 SPRAYS IN BOTH NOSTRILS EVERY DAY    finasteride (PROSCAR) 5 mg tablet Take 1 Tab by mouth daily.  predniSONE (DELTASONE) 20 mg tablet Take 60 mg by mouth daily (with breakfast).  metFORMIN ER (GLUCOPHAGE XR) 500 mg tablet TAKE 1 TABLET BY MOUTH EVERY DAY    cpap machine kit by Does Not Apply route.     triamcinolone (KENALOG) 0.1 % lotion APPLY EXTERNALLY TO THE AFFECTED AREA TWICE DAILY    albuterol (VENTOLIN HFA) 90 mcg/actuation inhaler INHALE 1 TO 2 PUFFS BY MOUTH EVERY 4 HOURS AS NEEDED FOR WHEEZING    lisinopril (PRINIVIL, ZESTRIL) 10 mg tablet Take 1 Tab by mouth daily.  fexofenadine (ALLEGRA) 180 mg tablet TK 1 T PO QD PRN    albuterol (PROVENTIL VENTOLIN) 2.5 mg /3 mL (0.083 %) nebulizer solution 3 mL by Nebulization route every four (4) hours as needed for Wheezing. No current facility-administered medications for this visit. Medical History  Past Medical History:   Diagnosis Date    Allergic rhinitis 2/21/2014    Asthma     Benign essential HTN 2/24/2019    Diabetes ([facility] [address]     History of seasonal allergies     Spots in front of the eye        Immunizations   Immunization History   Administered Date(s) Administered    PPD 09/24/2012       Social History  Social History     Tobacco Use    Smoking status: Never Smoker    Smokeless tobacco: Never Used   Substance Use Topics    Alcohol use: No    Drug use: No       Objective   There were no vitals taken for this visit. Physical Examination  GEN: No apparent distress. Alert and oriented and responds to all questions appropriately. EYES:  Conjunctiva clear; pupils round and reactive to light; extraocular movements are intact. EAR: External ears are normal.  Tympanic membranes are clear and without effusion. NOSE: Turbinates are within normal limits. No drainage  OROPHYARYNX: No oral lesions or exudates. NECK:  Supple; no masses; thyroid normal           LUNGS: Respirations unlabored; clear to auscultation bilaterally  CARDIOVASCULAR: Regular, rate, and rhythm without murmurs, gallops or rubs   ABDOMEN: Soft; nontender; nondistended; normoactive bowel sounds; no masses or organomegaly  NEUROLOGIC:  No focal neurologic deficits. Strength and sensation grossly intact. Coordination and gait grossly intact. EXT: Well perfused. No edema. SKIN: No obvious rashes.   Assessment   Quintin Graham is a 49 yTracyo. who presents for     Plan             I have discussed the aforementioned diagnoses and plan with the patient in detail. I have provided information in person and/or in AVS. All questions answered prior to discharge.     I discussed this patient with Dr. Erasto Butler (Attending Physician)   Signed By:  Armand Zhang MD    Family Medicine Resident

## 2020-01-10 ENCOUNTER — OFFICE VISIT (OUTPATIENT)
Dept: FAMILY MEDICINE CLINIC | Age: 55
End: 2020-01-10

## 2020-01-10 VITALS
SYSTOLIC BLOOD PRESSURE: 107 MMHG | BODY MASS INDEX: 31.34 KG/M2 | RESPIRATION RATE: 18 BRPM | DIASTOLIC BLOOD PRESSURE: 62 MMHG | HEIGHT: 64 IN | WEIGHT: 183.6 LBS | TEMPERATURE: 98.3 F | OXYGEN SATURATION: 98 % | HEART RATE: 95 BPM

## 2020-01-10 DIAGNOSIS — N40.1 BENIGN PROSTATIC HYPERPLASIA WITH URINARY HESITANCY: Primary | ICD-10-CM

## 2020-01-10 DIAGNOSIS — J45.40 MODERATE PERSISTENT ASTHMA WITHOUT COMPLICATION: ICD-10-CM

## 2020-01-10 DIAGNOSIS — R39.11 BENIGN PROSTATIC HYPERPLASIA WITH URINARY HESITANCY: Primary | ICD-10-CM

## 2020-01-10 DIAGNOSIS — E66.09 CLASS 1 OBESITY DUE TO EXCESS CALORIES WITHOUT SERIOUS COMORBIDITY WITH BODY MASS INDEX (BMI) OF 31.0 TO 31.9 IN ADULT: ICD-10-CM

## 2020-01-10 RX ORDER — TAMSULOSIN HYDROCHLORIDE 0.4 MG/1
0.4 CAPSULE ORAL DAILY
Qty: 30 CAP | Refills: 4 | Status: SHIPPED | OUTPATIENT
Start: 2020-01-10 | End: 2020-09-28

## 2020-01-10 RX ORDER — FLUTICASONE PROPIONATE 50 MCG
SPRAY, SUSPENSION (ML) NASAL
Qty: 3 BOTTLE | Refills: 3 | Status: SHIPPED | OUTPATIENT
Start: 2020-01-10 | End: 2021-03-27

## 2020-01-10 RX ORDER — FINASTERIDE 5 MG/1
5 TABLET, FILM COATED ORAL DAILY
Qty: 30 TAB | Refills: 1 | Status: SHIPPED | OUTPATIENT
Start: 2020-01-10 | End: 2020-09-28

## 2020-01-10 RX ORDER — MONTELUKAST SODIUM 10 MG/1
10 TABLET ORAL DAILY
Qty: 30 TAB | Refills: 3 | Status: SHIPPED | OUTPATIENT
Start: 2020-01-10 | End: 2020-09-28

## 2020-01-10 NOTE — PATIENT INSTRUCTIONS
Marcos Ocampo M.D.  [facility], [address]  Phone: [phone]  Fax: [phone]    DASH Diet: Care Instructions  Your Care Instructions    The DASH diet is an eating plan that can help lower your blood pressure. DASH stands for Dietary Approaches to Stop Hypertension. Hypertension is high blood pressure. The DASH diet focuses on eating foods that are high in calcium, potassium, and magnesium. These nutrients can lower blood pressure. The foods that are highest in these nutrients are fruits, vegetables, low-fat dairy products, nuts, seeds, and legumes. But taking calcium, potassium, and magnesium supplements instead of eating foods that are high in those nutrients does not have the same effect. The DASH diet also includes whole grains, fish, and poultry. The DASH diet is one of several lifestyle changes your doctor may recommend to lower your high blood pressure. Your doctor may also want you to decrease the amount of sodium in your diet. Lowering sodium while following the DASH diet can lower blood pressure even further than just the DASH diet alone. Follow-up care is a key part of your treatment and safety. Be sure to make and go to all appointments, and call your doctor if you are having problems. It's also a good idea to know your test results and keep a list of the medicines you take. How can you care for yourself at home? Following the DASH diet  · Eat 4 to 5 servings of fruit each day. A serving is 1 medium-sized piece of fruit, ½ cup chopped or canned fruit, 1/4 cup dried fruit, or 4 ounces (½ cup) of fruit juice. Choose fruit more often than fruit juice. · Eat 4 to 5 servings of vegetables each day. A serving is 1 cup of lettuce or raw leafy vegetables, ½ cup of chopped or cooked vegetables, or 4 ounces (½ cup) of vegetable juice. Choose vegetables more often than vegetable juice. · Get 2 to 3 servings of low-fat and fat-free dairy each day.  A serving is 8 ounces of milk, 1 cup of yogurt, or 1 ½ ounces of cheese. · Eat 6 to 8 servings of grains each day. A serving is 1 slice of bread, 1 ounce of dry cereal, or ½ cup of cooked rice, pasta, or cooked cereal. Try to choose whole-grain products as much as possible. · Limit lean meat, poultry, and fish to 2 servings each day. A serving is 3 ounces, about the size of a deck of cards. · Eat 4 to 5 servings of nuts, seeds, and legumes (cooked dried beans, lentils, and split peas) each week. A serving is 1/3 cup of nuts, 2 tablespoons of seeds, or ½ cup of cooked beans or peas. · Limit fats and oils to 2 to 3 servings each day. A serving is 1 teaspoon of vegetable oil or 2 tablespoons of salad dressing. · Limit sweets and added sugars to 5 servings or less a week. A serving is 1 tablespoon jelly or jam, ½ cup sorbet, or 1 cup of lemonade. · Eat less than 2,300 milligrams (mg) of sodium a day. If you limit your sodium to 1,500 mg a day, you can lower your blood pressure even more. Tips for success  · Start small. Do not try to make dramatic changes to your diet all at once. You might feel that you are missing out on your favorite foods and then be more likely to not follow the plan. Make small changes, and stick with them. Once those changes become habit, add a few more changes. · Try some of the following:  ? Make it a goal to eat a fruit or vegetable at every meal and at snacks. This will make it easy to get the recommended amount of fruits and vegetables each day. ? Try yogurt topped with fruit and nuts for a snack or healthy dessert. ? Add lettuce, tomato, cucumber, and onion to sandwiches. ? Combine a ready-made pizza crust with low-fat mozzarella cheese and lots of vegetable toppings. Try using tomatoes, squash, spinach, broccoli, carrots, cauliflower, and onions. ? Have a variety of cut-up vegetables with a low-fat dip as an appetizer instead of chips and dip. ?  Sprinkle sunflower seeds or chopped almonds over salads. Or try adding chopped walnuts or almonds to cooked vegetables. ? Try some vegetarian meals using beans and peas. Add garbanzo or kidney beans to salads. Make burritos and tacos with mashed wills beans or black beans. Where can you learn more? Go to http://maria elena-karol.info/. Enter G493 in the search box to learn more about \"DASH Diet: Care Instructions. \"  Current as of: April 9, 2019  Content Version: 12.2  © 9792-9009 [employer]. Care instructions adapted under license by [employer] (which disclaims liability or warranty for this information). If you have questions about a medical condition or this instruction, always ask your healthcare professional. [address] any warranty or liability for your use of this information.

## 2020-01-10 NOTE — PROGRESS NOTES
[address]  [address] [facility] [address]   [phone]    Date of visit:  1/10/2020    Harsha Camacho is an 49 y.o. male presents for medication refills. Patient presents for medication refills. He has no acute concerns. With regards to his BPH symptoms, states that he has not noted a change in his urinary stream. He continues to feel like his urine is 'slow' and it takes time to empty his bladder. He still feels like he needs to use the bathroom 45 min after drinking water and wakes ~3 times a night to urinate. With regards to his asthma, he states he feels fine today and is compliant with his medications     Of note, patient has been presenting to our clinic since 09/2019 for acute care visits and medication refills although he is established with Dr [facility]. Discussed with patient that while I encourage him to maintain his relationship with Dr Pratima Desir office, should he wish to continue to receive care at our office he would need to establish care with us. He was in agreement. Review of Systems   General/Constitutional:   No headache, fever, fatigue, weight loss or weight gain       Eyes:   No redness, pruritis, pain, visual changes, swelling, or discharge      Ears:    No pain, loss or changes in hearing     Neck:   No swelling, masses, stiffness, pain, or limited movement     Cardiac:    No chest pain      Respiratory:   No cough or shortness of breath     GI:   No nausea/vomiting, diarrhea, abdominal pain, bloody or dark stools       :   No dysuria or  hematuria    Neurological:   No loss of consciousness, dizziness, seizures, problems with balance, or unilateral weakness     Skin: No rash     Allergies   Allergies   Allergen Reactions    Pcn [Penicillins] Other (comments)     Father told him as child he was allergic to PCN.  He does not know if he has every been on a cephalosporin that he has tolerated     Medications  Current Outpatient Medications   Medication Sig    fluticasone propionate (FLONASE) 50 mcg/actuation nasal spray USE 2 SPRAYS IN BOTH NOSTRILS EVERY DAY    tamsulosin (FLOMAX) 0.4 mg capsule Take 1 Cap by mouth daily.  mometasone-formoterol (DULERA) 200-5 mcg/actuation HFA inhaler INHALE 2 PUFFS BY MOUTH TWICE DAILY    finasteride (PROSCAR) 5 mg tablet Take 1 Tab by mouth daily.  rosuvastatin (CRESTOR) 10 mg tablet TAKE 1 TABLET BY MOUTH EVERY NIGHT    levocetirizine (XYZAL) 5 mg tablet Take 1 Tab by mouth two (2) times a day.  metFORMIN ER (GLUCOPHAGE XR) 500 mg tablet TAKE 1 TABLET BY MOUTH EVERY DAY    cpap machine kit by Does Not Apply route.  albuterol (VENTOLIN HFA) 90 mcg/actuation inhaler INHALE 1 TO 2 PUFFS BY MOUTH EVERY 4 HOURS AS NEEDED FOR WHEEZING    lisinopril (PRINIVIL, ZESTRIL) 10 mg tablet Take 1 Tab by mouth daily.  fexofenadine (ALLEGRA) 180 mg tablet TK 1 T PO QD PRN     No current facility-administered medications for this visit. Medical History  Past Medical History:   Diagnosis Date    Allergic rhinitis 2/21/2014    Asthma     Benign essential HTN 2/24/2019    Diabetes ([facility] [address]     History of seasonal allergies     Newly converted positive PPD test 9/26/2012     Immunizations   Immunization History   Administered Date(s) Administered    PPD 09/24/2012     Social History  Social History     Tobacco Use    Smoking status: Never Smoker    Smokeless tobacco: Never Used   Substance Use Topics    Alcohol use: No    Drug use: No     Objective     Visit Vitals  /62 (BP 1 Location: Right arm, BP Patient Position: Sitting)   Pulse 95   Temp 98.3 °F (36.8 °C) (Oral)   Resp 18   Ht 5' 4\" (1.626 m)   Wt 183 lb 9.6 oz (83.3 kg)   SpO2 98%   BMI 31.51 kg/m²     Physical Examination  GEN: No apparent distress. Alert and oriented and responds to all questions appropriately.   NECK:  Supple; no masses; thyroid normal           LUNGS: Respirations unlabored; clear to auscultation bilaterally  CARDIOVASCULAR: Regular, rate, and rhythm without murmurs, gallops or rubs   ABDOMEN: Soft; nontender; nondistended; normoactive bowel sounds; no masses or organomegaly  NEUROLOGIC:  No focal neurologic deficits. Strength and sensation grossly intact. Coordination and gait grossly intact. EXT: Well perfused. No edema. SKIN: No obvious rashes. Jose Reich is a 49 y.o. who presents for medication refills. Plan   1. Benign prostatic hyperplasia with urinary hesitancy  - Patient states that his symptoms have not improved despite taking the Flomax and Finasteride daily since October 2019   - Refilled Flomax and Proscar today. Did not increase Flomax dosing as patient's BP is on the low side at baseline   - Referred to Urology for evaluation and recommendations regarding further testing for urinary symptoms  - REFERRAL TO UROLOGY placed and information provided to make an appointment     2. Moderate persistent asthma without complication  - Stable today   - Refilled:   - fluticasone propionate (FLONASE) 50 mcg/actuation nasal spray; USE 2 SPRAYS IN BOTH NOSTRILS EVERY DAY  Dispense: 3 Bottle; Refill: 3  - mometasone-formoterol (DULERA) 200-5 mcg/actuation HFA inhaler; INHALE 2 PUFFS BY MOUTH TWICE DAILY  Dispense: 1 Inhaler; Refill: 0   - montelukast (SINGULAIR) 10 mg tablet; Take 1 Tab by mouth daily. Dispense: 30 Tab; Refill: 3ontelukast (SINGULAIR) 10 mg tablet; Take 1 Tab by mouth daily. 3. Class 1 obesity due to excess calories without serious comorbidity with body mass index (BMI) of 31.0 to 31.9 in adult  - Patient with 5lb weight gain since last visit 2 months ago, secondary to caloric intake  - Discussed diet and lifestyle modifications at length today. Also reviewed lab results obtained by patient's Endocrinologist   - Patient seems motivated to reduced carb intake and increase exercise     I have discussed the aforementioned diagnoses and plan with the patient in detail. I have provided information in person and/or in AVS. All questions answered prior to discharge.     I discussed this patient with Dr. Javier Samuels (Attending Physician)   Signed By:  Val Echeverria MD    Family Medicine Resident

## 2020-01-14 NOTE — PROGRESS NOTES
Sheila Roy is a 49 y.o. male here for   Chief Complaint   Patient presents with    Diabetes       Functional glucose monitor and record keeping system? - yes  Eye exam within last year? - on file  Foot exam within last year? - on file, due soon    1. Have you been to the ER, urgent care clinic since your last visit? Hospitalized since your last visit? -no    2. Have you seen or consulted any other health care providers outside of the [address] [place] [address] since your last visit?   Include any pap smears or colon screening.-no

## 2020-01-15 ENCOUNTER — OFFICE VISIT (OUTPATIENT)
Dept: ENDOCRINOLOGY | Age: 55
End: 2020-01-15

## 2020-01-15 VITALS
HEIGHT: 64 IN | SYSTOLIC BLOOD PRESSURE: 121 MMHG | OXYGEN SATURATION: 98 % | WEIGHT: 181 LBS | HEART RATE: 95 BPM | TEMPERATURE: 95.5 F | BODY MASS INDEX: 30.9 KG/M2 | DIASTOLIC BLOOD PRESSURE: 69 MMHG | RESPIRATION RATE: 16 BRPM

## 2020-01-15 DIAGNOSIS — E78.00 HYPERCHOLESTEROLEMIA: ICD-10-CM

## 2020-01-15 DIAGNOSIS — E11.9 TYPE 2 DIABETES MELLITUS WITHOUT COMPLICATION, WITHOUT LONG-TERM CURRENT USE OF INSULIN (HCC): ICD-10-CM

## 2020-01-15 DIAGNOSIS — E11.9 DIABETES MELLITUS WITHOUT COMPLICATION (HCC): ICD-10-CM

## 2020-01-15 DIAGNOSIS — E11.21 TYPE 2 DIABETES WITH NEPHROPATHY (HCC): Primary | ICD-10-CM

## 2020-01-15 DIAGNOSIS — I10 BENIGN ESSENTIAL HTN: ICD-10-CM

## 2020-01-15 RX ORDER — LISINOPRIL 10 MG/1
10 TABLET ORAL DAILY
Qty: 90 TAB | Refills: 3 | Status: SHIPPED | OUTPATIENT
Start: 2020-01-15 | End: 2020-09-28 | Stop reason: SDUPTHER

## 2020-01-15 RX ORDER — PANTOPRAZOLE SODIUM 40 MG/1
40 TABLET, DELAYED RELEASE ORAL AS NEEDED
COMMUNITY
End: 2020-09-28

## 2020-01-15 RX ORDER — ROSUVASTATIN CALCIUM 10 MG/1
TABLET, COATED ORAL
Qty: 90 TAB | Refills: 3 | Status: SHIPPED | OUTPATIENT
Start: 2020-01-15 | End: 2020-09-28 | Stop reason: SDUPTHER

## 2020-01-15 NOTE — LETTER
1/16/20 Patient: Shirley Ruelas  
YOB: 1965 Date of Visit: 1/15/2020 Gregorio Syed MD 
[facility] [address] [facility] [address] [id number] VIA In Basket Dear Gregorio Syed MD, Thank you for referring Mr. Shirley Ruelas to [facility] for evaluation. My notes for this consultation are attached. If you have questions, please do not hesitate to call me. I look forward to following your patient along with you. Sincerely, Phill Coppoal MD

## 2020-01-15 NOTE — PROGRESS NOTES
Kannan Quigley MD        Patient Information  Date:1/15/2020  Name : Keith Rodriguez 49 y.o.     YOB: 1965         Referred by: Susanna Lopez MD         Chief Complaint   Patient presents with    Diabetes       History of Present Illness: Keith Rodriguez is a 49 y.o. male here for fu  of  Type 2 Diabetes Mellitus. Type 2 Diabetes was diagnosed in 2016 . End organ effects of diabetes: none. Cardiovascular risk factors: dyslipidemia, diabetes mellitus, male gender   He is taking the medications consistently  Started exercising recently  Generalized itching: Seen allergist/dermatology Dr. Giselle West, on antihistamines      Hypoglycemia: no    Taking metformin consistently        Wt Readings from Last 3 Encounters:   01/15/20 181 lb (82.1 kg)   01/10/20 183 lb 9.6 oz (83.3 kg)   11/27/19 178 lb 12.8 oz (81.1 kg)       BP Readings from Last 3 Encounters:   01/15/20 121/69   01/10/20 107/62   11/27/19 108/73           Past Medical History:   Diagnosis Date    Allergic rhinitis 2/21/2014    Asthma     Benign essential HTN 2/24/2019    Diabetes ([facility] [address]     History of seasonal allergies     Newly converted positive PPD test 9/26/2012     Current Outpatient Medications   Medication Sig    pantoprazole (PROTONIX) 40 mg tablet Take 40 mg by mouth as needed.  fluticasone propionate (FLONASE) 50 mcg/actuation nasal spray USE 2 SPRAYS IN BOTH NOSTRILS EVERY DAY    tamsulosin (FLOMAX) 0.4 mg capsule Take 1 Cap by mouth daily.  mometasone-formoterol (DULERA) 200-5 mcg/actuation HFA inhaler INHALE 2 PUFFS BY MOUTH TWICE DAILY    finasteride (PROSCAR) 5 mg tablet Take 1 Tab by mouth daily.  montelukast (SINGULAIR) 10 mg tablet Take 1 Tab by mouth daily.  rosuvastatin (CRESTOR) 10 mg tablet TAKE 1 TABLET BY MOUTH EVERY NIGHT    levocetirizine (XYZAL) 5 mg tablet Take 1 Tab by mouth two (2) times a day.     metFORMIN ER (GLUCOPHAGE XR) 500 mg tablet TAKE 1 TABLET BY MOUTH EVERY DAY    cpap machine kit by Does Not Apply route.  albuterol (VENTOLIN HFA) 90 mcg/actuation inhaler INHALE 1 TO 2 PUFFS BY MOUTH EVERY 4 HOURS AS NEEDED FOR WHEEZING    lisinopril (PRINIVIL, ZESTRIL) 10 mg tablet Take 1 Tab by mouth daily. (Patient taking differently: Take 5 mg by mouth daily.)    fexofenadine (ALLEGRA) 180 mg tablet TK 1 T PO QD PRN     No current facility-administered medications for this visit. Allergies   Allergen Reactions    Pcn [Penicillins] Other (comments)     Father told him as child he was allergic to PCN. He does not know if he has every been on a cephalosporin that he has tolerated         Review of Systems:  -   - Cardiovascular: no chest pain ,no palpitations  - Respiratory: no cough no shortness of breath  - Gastrointestinal: no dysphagia no  abdominal pain  - Musculoskeletal: no joint pains no  weakness  - Integumentary: no rashes  - Neurological: no numbness, tingling, no  headaches  - Psychiatric: no depression no  anxiety  - Endocrine: no heat or cold intolerance    Physical Examination:   Blood pressure 121/69, pulse 95, temperature 95.5 °F (35.3 °C), temperature source Oral, resp. rate 16, height 5' 4\" (1.626 m), weight 181 lb (82.1 kg), SpO2 98 %. Estimated body mass index is 31.07 kg/m² as calculated from the following:    Height as of this encounter: 5' 4\" (1.626 m). -   Weight as of this encounter: 181 lb (82.1 kg).   - General: pleasant, no distress, good eye contact  - HEENT: no pallor, no periorbital edema, EOMI  - Neck: supple, no thyromegaly, no nodules  - Cardiovascular: regular, normal rate, normal S1 and S2  - Respiratory: clear to auscultation bilaterally  - Gastrointestinal: soft, nontender, nondistended,  BS +  - Musculoskeletal: no proximal muscle weakness in upper or lower extremities  -   - Neurological:alert and oriented  - Psychiatric: normal mood and affect  - Skin: color, texture, turgor normal.       Data Reviewed: Diabetic foot exam: January 2019    Left:     Vibratory sensation normal    Filament test normal sensation with micro filament   Pulse DP: 1+    Deformities: None  Right:    Vibratory sensation normal   Filament test normal sensation with micro filament   Pulse DP: 1+   Deformities: None        Lab Results   Component Value Date/Time    Hemoglobin A1c 6.2 (H) 01/07/2020 08:10 AM    Hemoglobin A1c 6.1 (H) 09/03/2019 09:56 AM    Hemoglobin A1c 6.0 (H) 05/22/2019 08:29 AM    Hemoglobin A1c, External 7.4 09/23/2016    Glucose 101 (H) 01/07/2020 08:10 AM    Glucose (POC) 124 (H) 12/31/2013 10:32 PM    Microalb/Creat ratio (ug/mg creat.) <2.3 01/07/2020 08:10 AM    LDL, calculated 100 (H) 01/07/2020 08:10 AM    Creatinine (POC) 1.3 12/31/2013 10:32 PM    Creatinine 1.30 (H) 01/07/2020 08:10 AM      Lab Results   Component Value Date/Time    Cholesterol, total 163 01/07/2020 08:10 AM    HDL Cholesterol 40 01/07/2020 08:10 AM    LDL, calculated 100 (H) 01/07/2020 08:10 AM    Triglyceride 113 01/07/2020 08:10 AM       Lab Results   Component Value Date/Time    ALT (SGPT) 18 01/07/2020 08:10 AM    AST (SGOT) 18 01/07/2020 08:10 AM    Alk. phosphatase 54 01/07/2020 08:10 AM    Bilirubin, direct 0.27 08/14/2017 09:08 AM    Bilirubin, total 1.1 01/07/2020 08:10 AM       Lab Results   Component Value Date/Time    GFR est AA 72 01/07/2020 08:10 AM    GFR est non-AA 62 01/07/2020 08:10 AM    Creatinine (POC) 1.3 12/31/2013 10:32 PM    Creatinine 1.30 (H) 01/07/2020 08:10 AM    BUN 12 01/07/2020 08:10 AM    BUN (POC) 13 12/31/2013 10:32 PM    Sodium (POC) 140 12/31/2013 10:32 PM    Sodium 137 01/07/2020 08:10 AM    Potassium 3.9 01/07/2020 08:10 AM    Potassium (POC) 3.2 (L) 12/31/2013 10:32 PM    Chloride (POC) 105 12/31/2013 10:32 PM    Chloride 101 01/07/2020 08:10 AM    CO2 22 01/07/2020 08:10 AM        Assessment/Plan:     1. Type 2 diabetes with nephropathy ([facility] [address]    2. Benign essential HTN    3.  Hypercholesterolemia 1. Type 2 Diabetes Mellitus   Lab Results   Component Value Date/Time    Hemoglobin A1c 6.2 (H) 01/07/2020 08:10 AM    Hemoglobin A1c (POC) 5.9 08/21/2017 12:33 PM    Hemoglobin A1c, External 7.4 09/23/2016     Controlled    Diabetic issues reviewed : glycemic goals ,  low carbohydrate diet, weight control , home glucose monitoring emphasized,      2. Hyperlipidemia : Statin   weight loss encouraged        4. Obesity:Body mass index is 31.07 kg/m². Discussed about the importance of exercise and carbohydrate portion control. 5.  Itching rash: Followed by allergist    There are no Patient Instructions on file for this visit. Thank you for allowing me to participate in the care of this patient.     Jaspreet Givens MD      Patient verbalized understanding

## 2020-01-16 ENCOUNTER — OFFICE VISIT (OUTPATIENT)
Dept: FAMILY MEDICINE CLINIC | Age: 55
End: 2020-01-16

## 2020-01-16 VITALS
BODY MASS INDEX: 30.87 KG/M2 | WEIGHT: 180.8 LBS | HEIGHT: 64 IN | DIASTOLIC BLOOD PRESSURE: 65 MMHG | RESPIRATION RATE: 18 BRPM | SYSTOLIC BLOOD PRESSURE: 104 MMHG | HEART RATE: 97 BPM | TEMPERATURE: 97.4 F

## 2020-01-16 DIAGNOSIS — K21.00 GASTROESOPHAGEAL REFLUX DISEASE WITH ESOPHAGITIS: ICD-10-CM

## 2020-01-16 DIAGNOSIS — E11.65 TYPE 2 DIABETES MELLITUS WITH HYPERGLYCEMIA, WITHOUT LONG-TERM CURRENT USE OF INSULIN (HCC): ICD-10-CM

## 2020-01-16 DIAGNOSIS — Z12.11 SCREENING FOR COLON CANCER: ICD-10-CM

## 2020-01-16 DIAGNOSIS — R07.9 INTERMITTENT CHEST PAIN: Primary | ICD-10-CM

## 2020-01-16 DIAGNOSIS — R35.0 URINARY FREQUENCY: ICD-10-CM

## 2020-01-16 DIAGNOSIS — R39.11 BENIGN PROSTATIC HYPERPLASIA WITH URINARY HESITANCY: ICD-10-CM

## 2020-01-16 DIAGNOSIS — R33.9 URINARY RETENTION: ICD-10-CM

## 2020-01-16 DIAGNOSIS — I10 BENIGN ESSENTIAL HTN: ICD-10-CM

## 2020-01-16 DIAGNOSIS — E78.00 HYPERCHOLESTEROLEMIA: ICD-10-CM

## 2020-01-16 DIAGNOSIS — N40.1 BENIGN PROSTATIC HYPERPLASIA WITH URINARY HESITANCY: ICD-10-CM

## 2020-01-16 NOTE — PATIENT INSTRUCTIONS
I suspect that the patient's chest pain is multifactorial and while I believe it is reasonable that this might be due to his gastroesophageal reflux disease and some referred discomfort it is also reasonable to have an evaluation because he does have diabetes. I have made him a referral to both GI and cardiology that he may follow-up on and I believe this will put his mind to these because he appears to be very anxious about this His blood pressure control is adequate today, he reports to me that his lisinopril was recently decreased from 10-5. He is worried this might negatively affect his kidneys but I have reassured him that it will not Given his ongoing urologic symptoms without results that have been evaluated over the past 5 months by the residency clinic I have recommended that he follow-up on their referral to urology and so I have given him a referral so that he might be seen. Continue to control his diabetes as well as he can continue to follow with endocrinology Follow-up with Dr. Georgia Meehan on an as-needed basis

## 2020-01-16 NOTE — PROGRESS NOTES
Family Medicine Acute Visit Progress Note  Patient: Alicia Reyna  1965, 49 y.o., male  Encounter Date: 1/16/2020    ASSESSMENT & PLAN    ICD-10-CM ICD-9-CM    1. Intermittent chest pain R07.9 786.50 REFERRAL TO CARDIOLOGY   2. Benign prostatic hyperplasia with urinary hesitancy N40.1 600.01 REFERRAL TO UROLOGY    R39.11 788.64    3. Urinary frequency R35.0 788.41 REFERRAL TO UROLOGY   4. Benign essential HTN I10 401.1 REFERRAL TO CARDIOLOGY   5. Type 2 diabetes mellitus with hyperglycemia, without long-term current use of insulin (HCC) E11.65 250.00 REFERRAL TO CARDIOLOGY     790.29    6. Urinary retention R33.9 788.20 REFERRAL TO UROLOGY   7.  Hypercholesterolemia E78.00 272.0 REFERRAL TO CARDIOLOGY   8. Gastroesophageal reflux disease with esophagitis K21.0 530.11 REFERRAL TO GASTROENTEROLOGY   9. Screening for colon cancer Z12.11 V76.51 REFERRAL TO GASTROENTEROLOGY       Orders Placed This Encounter    REFERRAL TO UROLOGY     Referral Priority:   Routine     Referral Type:   Consultation     Referral Reason:   Specialty Services Required     Referred to Provider:   Laurie Buck MD     Requested Specialty:   Urology     Number of Visits Requested:   1    [facility]     Referral Priority:   Routine     Referral Type:   Consultation     Referral Reason:   Specialty Services Required     Referral Location:   [facility]     Referred to Provider:   Corin Bonilla MD     Number of Visits Requested:   . [address]     Referral Priority:   Routine     Referral Type:   Consultation     Referral Reason:   Specialty Services Required     Referred to Provider:   Jose Potter MD     Number of Visits Requested:   1       Patient Instructions   I suspect that the patient's chest pain is multifactorial and while I believe it is reasonable that this might be due to his gastroesophageal reflux disease and some referred discomfort it is also reasonable to have an evaluation because he does have diabetes. I have made him a referral to both GI and cardiology that he may follow-up on and I believe this will put his mind to these because he appears to be very anxious about this  His blood pressure control is adequate today, he reports to me that his lisinopril was recently decreased from 10-5. He is worried this might negatively affect his kidneys but I have reassured him that it will not  Given his ongoing urologic symptoms without results that have been evaluated over the past 5 months by the residency clinic I have recommended that he follow-up on their referral to urology and so I have given him a referral so that he might be seen. Continue to control his diabetes as well as he can continue to follow with endocrinology  Follow-up with Dr. Ger Harp on an as-needed basis      CHIEF COMPLAINT  Chief Complaint   Patient presents with    DANI Buenrostro Levels is a 49 y.o. male presenting today for multiple complaints. The patient reports he was recently seen at the residency clinic because he was unable to get an appointment here at our office and he was recommended to follow-up with a urologist for some of his urinary complaints. He reports that he is taking his medications however he has an increase in his urinary frequency. He is having to get up at night to urinate  This is in spite of well-controlled diabetes. He has had multiple negative urinalyses  He also complains of ill-defined right-sided chest pain. He reports to me that his diabetes doctor told him that he should consider having a stress test and so he would like a referral to have a stress test done. He also has a known history of acid reflux, he reports that this chest pain is less common when he does take his acid reflux medicine and so it may in fact be from that however he would like to know for sure.   He has never seen a GI doctor, he has never had a colon cancer screening test.  No weight loss unexplained, no blood in stool, no vomiting, no unexpected weight changes  Review of Systems  A 12 point review of systems was negative except as noted here or in the HPI. OBJECTIVE  Visit Vitals  /65 (BP 1 Location: Left arm, BP Patient Position: Sitting)   Pulse 97   Temp 97.4 °F (36.3 °C) (Oral)   Resp 18   Ht 5' 4\" (1.626 m)   Wt 180 lb 12.8 oz (82 kg)   BMI 31.03 kg/m²       Physical Exam  Vitals signs and nursing note reviewed. Constitutional:       General: He is not in acute distress. Appearance: Normal appearance. He is well-developed. He is obese. He is not toxic-appearing or diaphoretic. HENT:      Head: Normocephalic and atraumatic. Right Ear: External ear normal.      Left Ear: External ear normal.      Nose: Nose normal.      Mouth/Throat:      Mouth: Mucous membranes are moist.      Pharynx: Oropharynx is clear. No oropharyngeal exudate or posterior oropharyngeal erythema. Eyes:      General: No scleral icterus. Right eye: No discharge. Left eye: No discharge. Extraocular Movements: Extraocular movements intact. Conjunctiva/sclera: Conjunctivae normal.      Pupils: Pupils are equal, round, and reactive to light. Neck:      Musculoskeletal: Normal range of motion and neck supple. Vascular: No carotid bruit. Cardiovascular:      Rate and Rhythm: Normal rate and regular rhythm. Heart sounds: Normal heart sounds. No murmur. No friction rub. No gallop. Pulmonary:      Effort: Pulmonary effort is normal. No respiratory distress. Breath sounds: Normal breath sounds. No stridor. No wheezing, rhonchi or rales. Chest:      Chest wall: No tenderness (Chest pain not reproducible with exam). Abdominal:      General: Bowel sounds are normal. There is no distension. Palpations: Abdomen is soft. Tenderness: There is tenderness. There is no guarding or rebound.       Comments: Protuberant abdomen, mild discomfort in the mid epigastrium with palpation   Genitourinary:     Comments: Declines exam, wants to see urology  Musculoskeletal:         General: No swelling, tenderness or signs of injury. Right lower leg: No edema. Left lower leg: No edema. Lymphadenopathy:      Cervical: No cervical adenopathy. Skin:     General: Skin is warm and dry. Capillary Refill: Capillary refill takes less than 2 seconds. Findings: No bruising or rash. Neurological:      General: No focal deficit present. Mental Status: He is alert and oriented to person, place, and time. Mental status is at baseline. Gait: Gait normal.   Psychiatric:         Mood and Affect: Mood normal.         Behavior: Behavior normal.         Thought Content: Thought content normal.         Judgment: Judgment normal.         No results found for any visits on 01/16/20. HISTORICAL  PMH, PSH, FHX, SOCHX, ALLERGIES and MES were reviewed and updated today. Elyssa Herrera MD  [facility]  01/16/20 10:31 AM    Portions of this note may have been populated using smart dictation software and may have \"sounds-like\" errors present. Pt was counseled on risks, benefits and alternatives of treatment options. All questions were asked and answered and the patient was agreeable with the treatment plan as outlined.

## 2020-03-02 DIAGNOSIS — J45.40 MODERATE PERSISTENT ASTHMA WITHOUT COMPLICATION: ICD-10-CM

## 2020-03-11 DIAGNOSIS — J45.40 MODERATE PERSISTENT ASTHMA WITHOUT COMPLICATION: ICD-10-CM

## 2020-03-12 RX ORDER — MOMETASONE FUROATE AND FORMOTEROL FUMARATE DIHYDRATE 200; 5 UG/1; UG/1
AEROSOL RESPIRATORY (INHALATION)
Qty: 3 INHALER | Refills: 3 | Status: SHIPPED | OUTPATIENT
Start: 2020-03-12 | End: 2022-07-05 | Stop reason: SDUPTHER

## 2020-03-21 ENCOUNTER — HOSPITAL ENCOUNTER (EMERGENCY)
Age: 55
Discharge: HOME | End: 2020-03-22
Attending: EMERGENCY MEDICINE
Payer: COMMERCIAL

## 2020-03-21 ENCOUNTER — APPOINTMENT (OUTPATIENT)
Dept: GENERAL RADIOLOGY | Age: 55
End: 2020-03-21
Attending: EMERGENCY MEDICINE
Payer: COMMERCIAL

## 2020-03-21 VITALS
OXYGEN SATURATION: 98 % | SYSTOLIC BLOOD PRESSURE: 138 MMHG | TEMPERATURE: 98.3 F | BODY MASS INDEX: 30.05 KG/M2 | RESPIRATION RATE: 17 BRPM | DIASTOLIC BLOOD PRESSURE: 83 MMHG | WEIGHT: 176 LBS | HEIGHT: 64 IN | HEART RATE: 91 BPM

## 2020-03-21 DIAGNOSIS — R10.9 ABDOMINAL PAIN, UNSPECIFIED ABDOMINAL LOCATION: ICD-10-CM

## 2020-03-21 DIAGNOSIS — J01.10 ACUTE NON-RECURRENT FRONTAL SINUSITIS: Primary | ICD-10-CM

## 2020-03-21 DIAGNOSIS — R11.2 NON-INTRACTABLE VOMITING WITH NAUSEA, UNSPECIFIED VOMITING TYPE: ICD-10-CM

## 2020-03-21 LAB
ALBUMIN SERPL-MCNC: 3.4 G/DL (ref 3.5–5)
ALBUMIN/GLOB SERPL: 0.9 {RATIO} (ref 1.1–2.2)
ALP SERPL-CCNC: 64 U/L (ref 45–117)
ALT SERPL-CCNC: 25 U/L (ref 12–78)
ANION GAP SERPL CALC-SCNC: 8 MMOL/L (ref 5–15)
AST SERPL-CCNC: 24 U/L (ref 15–37)
BASOPHILS # BLD: 0 K/UL (ref 0–0.1)
BASOPHILS NFR BLD: 0 % (ref 0–1)
BILIRUB SERPL-MCNC: 0.9 MG/DL (ref 0.2–1)
BUN SERPL-MCNC: 15 MG/DL (ref 6–20)
BUN/CREAT SERPL: 12 (ref 12–20)
CALCIUM SERPL-MCNC: 8.5 MG/DL (ref 8.5–10.1)
CHLORIDE SERPL-SCNC: 111 MMOL/L (ref 97–108)
CO2 SERPL-SCNC: 20 MMOL/L (ref 21–32)
COMMENT, HOLDF: NORMAL
CREAT SERPL-MCNC: 1.27 MG/DL (ref 0.7–1.3)
DEPRECATED S PYO AG THROAT QL EIA: NEGATIVE
DIFFERENTIAL METHOD BLD: ABNORMAL
EOSINOPHIL # BLD: 0.2 K/UL (ref 0–0.4)
EOSINOPHIL NFR BLD: 2 % (ref 0–7)
ERYTHROCYTE [DISTWIDTH] IN BLOOD BY AUTOMATED COUNT: 17.2 % (ref 11.5–14.5)
GLOBULIN SER CALC-MCNC: 3.8 G/DL (ref 2–4)
GLUCOSE SERPL-MCNC: 128 MG/DL (ref 65–100)
HCT VFR BLD AUTO: 44 % (ref 36.6–50.3)
HGB BLD-MCNC: 14.2 G/DL (ref 12.1–17)
IMM GRANULOCYTES # BLD AUTO: 0 K/UL (ref 0–0.04)
IMM GRANULOCYTES NFR BLD AUTO: 0 % (ref 0–0.5)
LIPASE SERPL-CCNC: 162 U/L (ref 73–393)
LYMPHOCYTES # BLD: 2 K/UL (ref 0.8–3.5)
LYMPHOCYTES NFR BLD: 29 % (ref 12–49)
MCH RBC QN AUTO: 24 PG (ref 26–34)
MCHC RBC AUTO-ENTMCNC: 32.3 G/DL (ref 30–36.5)
MCV RBC AUTO: 74.3 FL (ref 80–99)
MONOCYTES # BLD: 0.4 K/UL (ref 0–1)
MONOCYTES NFR BLD: 6 % (ref 5–13)
NEUTS SEG # BLD: 4.2 K/UL (ref 1.8–8)
NEUTS SEG NFR BLD: 63 % (ref 32–75)
NRBC # BLD: 0 K/UL (ref 0–0.01)
NRBC BLD-RTO: 0 PER 100 WBC
PLATELET # BLD AUTO: 266 K/UL (ref 150–400)
PMV BLD AUTO: 9.2 FL (ref 8.9–12.9)
POTASSIUM SERPL-SCNC: 3.8 MMOL/L (ref 3.5–5.1)
PROT SERPL-MCNC: 7.2 G/DL (ref 6.4–8.2)
RBC # BLD AUTO: 5.92 M/UL (ref 4.1–5.7)
SAMPLES BEING HELD,HOLD: NORMAL
SODIUM SERPL-SCNC: 139 MMOL/L (ref 136–145)
TROPONIN I SERPL-MCNC: <0.05 NG/ML
WBC # BLD AUTO: 6.9 K/UL (ref 4.1–11.1)

## 2020-03-21 PROCEDURE — 36415 COLL VENOUS BLD VENIPUNCTURE: CPT

## 2020-03-21 PROCEDURE — 84484 ASSAY OF TROPONIN QUANT: CPT

## 2020-03-21 PROCEDURE — 87070 CULTURE OTHR SPECIMN AEROBIC: CPT

## 2020-03-21 PROCEDURE — 93005 ELECTROCARDIOGRAM TRACING: CPT

## 2020-03-21 PROCEDURE — 74022 RADEX COMPL AQT ABD SERIES: CPT

## 2020-03-21 PROCEDURE — 87880 STREP A ASSAY W/OPTIC: CPT

## 2020-03-21 PROCEDURE — 83690 ASSAY OF LIPASE: CPT

## 2020-03-21 PROCEDURE — 99282 EMERGENCY DEPT VISIT SF MDM: CPT

## 2020-03-21 PROCEDURE — 80053 COMPREHEN METABOLIC PANEL: CPT

## 2020-03-21 PROCEDURE — 85025 COMPLETE CBC W/AUTO DIFF WBC: CPT

## 2020-03-22 LAB
ATRIAL RATE: 84 BPM
CALCULATED P AXIS, ECG09: 54 DEGREES
CALCULATED R AXIS, ECG10: 0 DEGREES
CALCULATED T AXIS, ECG11: 38 DEGREES
DIAGNOSIS, 93000: NORMAL
P-R INTERVAL, ECG05: 144 MS
Q-T INTERVAL, ECG07: 360 MS
QRS DURATION, ECG06: 84 MS
QTC CALCULATION (BEZET), ECG08: 425 MS
VENTRICULAR RATE, ECG03: 84 BPM

## 2020-03-22 NOTE — ED PROVIDER NOTES
48 yo male with hx htn, xol, diabetes  presents with c/o sinus pressure, nasal congestion, frontal headache and eye pressure for over 1 week. Patient reports he has had not had any fevers or travel or sick contacts. [facility] and was put on a Z-Cruz. He reports he felt improved but not 100% better so he returned on 3/18 and was put on doxycycline. He was concerned that he could have coronavirus. He reports they were unable to test [facility] at [facility] and told him to come to the ER if he had concern. He does report some associated nausea and vomiting at night after eating dinner only for the past 3 days. No blood in the emesis. He reports he is having normal bowel movements. He denies any nausea or vomiting or chest pain with exertion. He denies coughing up any mucus. Heart review shows [facility] at [facility] and had blood work done showing a white count of 7. Blood work is scanned into the chart. His influenza testing was negative. Past Medical History:   Diagnosis Date    Allergic rhinitis 2/21/2014    Asthma     Benign essential HTN 2/24/2019    Diabetes ([facility] [address]     History of seasonal allergies     Newly converted positive PPD test 9/26/2012    KAYCE (obstructive sleep apnea)     Uses nightly CPAP machine. Follows with Dr. Cruz Meléndez ([facility]), annual visits       Past Surgical History:   Procedure Laterality Date    HX ORTHOPAEDIC      L arm surgery R/T broken bone    HX OTHER SURGICAL      R eye surg to remove spot on eye.          Family History:   Problem Relation Age of Onset    No Known Problems Mother     No Known Problems Father        Social History     Socioeconomic History    Marital status:      Spouse name: Not on file    Number of children: Not on file    Years of education: Not on file    Highest education level: Not on file   Occupational History    Not on file   Social Needs    Financial resource strain: Not on file    Food insecurity     Worry: Not on file     Inability: Not on file    Transportation needs     Medical: Not on file     Non-medical: Not on file   Tobacco Use    Smoking status: Never Smoker    Smokeless tobacco: Never Used   Substance and Sexual Activity    Alcohol use: No    Drug use: No    Sexual activity: Not on file   Lifestyle    Physical activity     Days per week: Not on file     Minutes per session: Not on file    Stress: Not on file   Relationships    Social connections     Talks on phone: Not on file     Gets together: Not on file     Attends Mandaeism service: Not on file     Active member of club or organization: Not on file     Attends meetings of clubs or organizations: Not on file     Relationship status: Not on file    Intimate partner violence     Fear of current or ex partner: Not on file     Emotionally abused: Not on file     Physically abused: Not on file     Forced sexual activity: Not on file   Other Topics Concern    Not on file   Social History Narrative    Not on file         ALLERGIES: Pcn [penicillins]    Review of Systems   Constitutional: Negative for fever. HENT: Positive for sinus pressure. Eyes: Positive for pain. Negative for visual disturbance. Respiratory: Negative for shortness of breath. Cardiovascular: Negative for chest pain. Gastrointestinal: Positive for nausea and vomiting. Negative for abdominal pain. Neurological: Positive for headaches. All other systems reviewed and are negative. Vitals:    03/21/20 2204   BP: 138/83   Pulse: 91   Resp: 17   Temp: 98.3 °F (36.8 °C)   SpO2: 98%   Weight: 79.8 kg (176 lb)   Height: 5' 4\" (1.626 m)            Physical Exam  Vitals signs and nursing note reviewed. Constitutional:       General: He is not in acute distress. Appearance: He is well-developed. He is not diaphoretic. HENT:      Head: Normocephalic and atraumatic.       Mouth/Throat:      Comments: Small exudate to right tonsil and vesicles to posterior pharynx  Eyes:      Pupils: Pupils are equal, round, and reactive to light. Neck:      Musculoskeletal: Normal range of motion and neck supple. No neck rigidity. Cardiovascular:      Rate and Rhythm: Normal rate and regular rhythm. Pulses: Normal pulses. Heart sounds: Normal heart sounds. No murmur. No friction rub. Pulmonary:      Effort: Pulmonary effort is normal. No respiratory distress. Breath sounds: Normal breath sounds. No stridor. No wheezing, rhonchi or rales. Chest:      Chest wall: No tenderness. Abdominal:      General: Bowel sounds are normal. There is no distension. Palpations: Abdomen is soft. There is no mass. Tenderness: There is no abdominal tenderness. There is no guarding or rebound. Hernia: No hernia is present. Musculoskeletal: Normal range of motion. General: No tenderness. Skin:     General: Skin is warm and dry. Coloration: Skin is not pale. Neurological:      Mental Status: He is alert and oriented to person, place, and time. Cranial Nerves: No cranial nerve deficit. Sensory: No sensory deficit. Motor: No weakness or abnormal muscle tone. Coordination: Coordination normal.   Psychiatric:         Behavior: Behavior normal.          MDM  Number of Diagnoses or Management Options  Abdominal pain, unspecified abdominal location:   Acute non-recurrent frontal sinusitis:   Non-intractable vomiting with nausea, unspecified vomiting type:   Diagnosis management comments: Spoke with the [facility] he states he did not tell the patient to come here for testing for coronavirus though patient reports that is what he was told. I explained to the patient we do not have the ability to do that. If he has questions or concerns he should call his primary care and can be referred for outpatient lab core testing. The suspicion is low.   For his nausea and vomiting I suspect it is due to the antibiotics however with his history of diabetes and high cholesterol we will check an EKG troponin and a lipase. Sounds like he is only vomiting after eating dinners. Amount and/or Complexity of Data Reviewed  Clinical lab tests: ordered and reviewed  Tests in the radiology section of CPT®: ordered and reviewed  Independent visualization of images, tracings, or specimens: yes (EKG)    Patient Progress  Patient progress: stable         Procedures    ED EKG interpretation:  Rhythm: normal sinus rhythm; and regular . Rate (approx.): 85; Axis: normal; P wave: normal; QRS interval: normal ; ST/T wave: non-specific changes  EKG documented by Jarek Denny MD, scribe, as interpreted by Blaze Jones MD, ED MD.      12:04 AM  Patient's results have been reviewed with them. Patient and/or family have verbally conveyed their understanding and agreement of the patient's signs, symptoms, diagnosis, treatment and prognosis and additionally agree to follow up as recommended or return to the Emergency Room should their condition change prior to follow-up. Discharge instructions have also been provided to the patient with some educational information regarding their diagnosis as well a list of reasons why they would want to return to the ER prior to their follow-up appointment should their condition change. Suspect patient's upset stomach is from the antibiotics and I recommended he take it with probiotic and food. Will give ent referral as well.

## 2020-03-22 NOTE — DISCHARGE INSTRUCTIONS
Patient Education   [address] [place] [address]. TAKE A PROBIOTIC AND TAKE WITH FOOD. IF YOU ARE CONCERNED FOR CORONAVIRUS, ASK YOU DOCTOR TO SEND YOU FOR OUTPATIENT TEST    You have been diagnosed with a respiratory illness. Most respiratory illnesses are due viruses but some may be caused by bacteria. Viral illnesses usually get better with time and rest.   If you were diagnosed with a bacterial illness you will likely receive an antibiotic. Please take the following precautions to limit the spread of illness. Stay at home except to get medical care. Seek medical attention if you develop worsening symptoms or new concerns such as severe shortness of breath, chest pain, etc.    Separate yourself from other people and animals in your home. If possible, stay in a separate room and use a separate bathroom from others in your house. Restrict contact with pets, as there is a possibility of transmission of the virus. Call your doctor before showing up at their office. Wear a facemask when you are around other people. Cover your mouth when you cough or sneeze. Wash your hands often with warm soapy water for at least 20 seconds. If soap and water are not available, use an alcohol based hand . Clean all high touch surfaces everyday. For example: counters, tabletops, doorknobs, bathroom fixtures, toilets, phones, keyboards, tablets, and bedside tables. Monitor your symptoms at home. Seek prompt medical attention if your symptoms worsen. (i.e. difficulty breathing). Wear a mask upon entering the facility. Abdominal Pain: Care Instructions  Your Care Instructions    Abdominal pain has many possible causes. Some aren't serious and get better on their own in a few days. Others need more testing and treatment. If your pain continues or gets worse, you need to be rechecked and may need more tests to find out what is wrong. You may need surgery to correct the problem.   Don't ignore new symptoms, such as fever, nausea and vomiting, urination problems, pain that gets worse, and dizziness. These may be signs of a more serious problem. Your doctor may have recommended a follow-up visit in the next 8 to 12 hours. If you are not getting better, you may need more tests or treatment. The doctor has checked you carefully, but problems can develop later. If you notice any problems or new symptoms, get medical treatment right away. Follow-up care is a key part of your treatment and safety. Be sure to make and go to all appointments, and call your doctor if you are having problems. It's also a good idea to know your test results and keep a list of the medicines you take. How can you care for yourself at home? · Rest until you feel better. · To prevent dehydration, drink plenty of fluids, enough so that your urine is light yellow or clear like water. Choose water and other caffeine-free clear liquids until you feel better. If you have kidney, heart, or liver disease and have to limit fluids, talk with your doctor before you increase the amount of fluids you drink. · If your stomach is upset, eat mild foods, such as rice, dry toast or crackers, bananas, and applesauce. Try eating several small meals instead of two or three large ones. · Wait until 48 hours after all symptoms have gone away before you have spicy foods, alcohol, and drinks that contain caffeine. · Do not eat foods that are high in fat. · Avoid anti-inflammatory medicines such as aspirin, ibuprofen (Advil, Motrin), and naproxen (Aleve). These can cause stomach upset. Talk to your doctor if you take daily aspirin for another health problem. When should you call for help? Call 911 anytime you think you may need emergency care.  For example, call if:    · You passed out (lost consciousness).     · You pass maroon or very bloody stools.     · You vomit blood or what looks like coffee grounds.     · You have new, severe belly pain.    Call your doctor now or seek immediate medical care if:    · Your pain gets worse, especially if it becomes focused in one area of your belly.     · You have a new or higher fever.     · Your stools are black and look like tar, or they have streaks of blood.     · You have unexpected vaginal bleeding.     · You have symptoms of a urinary tract infection. These may include:  ? Pain when you urinate. ? Urinating more often than usual.  ? Blood in your urine.     · You are dizzy or lightheaded, or you feel like you may faint.    Watch closely for changes in your health, and be sure to contact your doctor if:    · You are not getting better after 1 day (24 hours). Where can you learn more? Go to http://maria elena-karol.info/  Enter F836 in the search box to learn more about \"Abdominal Pain: Care Instructions. \"  Current as of: June 26, 2019Content Version: 12.4  © 9694-8959 [employer]. Care instructions adapted under license by [employer] (which disclaims liability or warranty for this information). If you have questions about a medical condition or this instruction, always ask your healthcare professional. [place] [address] any warranty or liability for your use of this information. Patient Education        Nausea and Vomiting: Care Instructions  Your Care Instructions    When you are nauseated, you may feel weak and sweaty and notice a lot of saliva in your mouth. Nausea often leads to vomiting. Most of the time you do not need to worry about nausea and vomiting, but they can be signs of other illnesses. Two common causes of nausea and vomiting are stomach flu and food poisoning. Nausea and vomiting from viral stomach flu will usually start to improve within 24 hours. Nausea and vomiting from food poisoning may last from 12 to 48 hours. The doctor has checked you carefully, but problems can develop later.  If you notice any problems or new symptoms, get medical treatment right away. Follow-up care is a key part of your treatment and safety. Be sure to make and go to all appointments, and call your doctor if you are having problems. It's also a good idea to know your test results and keep a list of the medicines you take. How can you care for yourself at home? · To prevent dehydration, drink plenty of fluids, enough so that your urine is light yellow or clear like water. Choose water and other caffeine-free clear liquids until you feel better. If you have kidney, heart, or liver disease and have to limit fluids, talk with your doctor before you increase the amount of fluids you drink. · Rest in bed until you feel better. · When you are able to eat, try clear soups, mild foods, and liquids until all symptoms are gone for 12 to 48 hours. Other good choices include dry toast, crackers, cooked cereal, and gelatin dessert, such as Jell-O. When should you call for help? Call 911 anytime you think you may need emergency care. For example, call if:    · You passed out (lost consciousness).    Call your doctor now or seek immediate medical care if:    · You have symptoms of dehydration, such as:  ? Dry eyes and a dry mouth. ? Passing only a little dark urine. ? Feeling thirstier than usual.     · You have new or worsening belly pain.     · You have a new or higher fever.     · You vomit blood or what looks like coffee grounds.    Watch closely for changes in your health, and be sure to contact your doctor if:    · You have ongoing nausea and vomiting.     · Your vomiting is getting worse.     · Your vomiting lasts longer than 2 days.     · You are not getting better as expected. Where can you learn more? Go to http://maria elena-karol.info/  Enter H591 in the search box to learn more about \"Nausea and Vomiting: Care Instructions. \"  Current as of: June 26, 2019Content Version: 12.4  © 3960-1073 [employer].   Care instructions adapted under license by [employer] (which disclaims liability or warranty for this information). If you have questions about a medical condition or this instruction, always ask your healthcare professional. [address] any warranty or liability for your use of this information. Patient Education        Sinusitis: Care Instructions  Your Care Instructions    Sinusitis is an infection of the lining of the sinus cavities in your head. Sinusitis often follows a cold. It causes pain and pressure in your head and face. In most cases, sinusitis gets better on its own in 1 to 2 weeks. But some mild symptoms may last for several weeks. Sometimes antibiotics are needed. Follow-up care is a key part of your treatment and safety. Be sure to make and go to all appointments, and call your doctor if you are having problems. It's also a good idea to know your test results and keep a list of the medicines you take. How can you care for yourself at home? · Take an over-the-counter pain medicine, such as acetaminophen (Tylenol), ibuprofen (Advil, Motrin), or naproxen (Aleve). Read and follow all instructions on the label. · If the doctor prescribed antibiotics, take them as directed. Do not stop taking them just because you feel better. You need to take the full course of antibiotics. · Be careful when taking over-the-counter cold or flu medicines and Tylenol at the same time. Many of these medicines have acetaminophen, which is Tylenol. Read the labels to make sure that you are not taking more than the recommended dose. Too much acetaminophen (Tylenol) can be harmful. · Breathe warm, moist air from a steamy shower, a hot bath, or a sink filled with hot water. Avoid cold, dry air. Using a humidifier in your home may help. Follow the directions for cleaning the machine.   · Use saline (saltwater) nasal washes to help keep your nasal passages open and wash out mucus and bacteria. You can buy saline nose drops at a grocery store or drugstore. Or you can make your own at home by adding 1 teaspoon of salt and 1 teaspoon of baking soda to 2 cups of distilled water. If you make your own, fill a bulb syringe with the solution, insert the tip into your nostril, and squeeze gently. Duana Glow your nose. · Put a hot, wet towel or a warm gel pack on your face 3 or 4 times a day for 5 to 10 minutes each time. · Try a decongestant nasal spray like oxymetazoline (Afrin). Do not use it for more than 3 days in a row. Using it for more than 3 days can make your congestion worse. When should you call for help? Call your doctor now or seek immediate medical care if:    · You have new or worse swelling or redness in your face or around your eyes.     · You have a new or higher fever.    Watch closely for changes in your health, and be sure to contact your doctor if:    · You have new or worse facial pain.     · The mucus from your nose becomes thicker (like pus) or has new blood in it.     · You are not getting better as expected. Where can you learn more? Go to http://maria elena-karol.info/  Enter K046 in the search box to learn more about \"Sinusitis: Care Instructions. \"  Current as of: July 28, 2019Content Version: 12.4  © 3074-9788 [employer]. Care instructions adapted under license by [employer] (which disclaims liability or warranty for this information). If you have questions about a medical condition or this instruction, always ask your healthcare professional. [address] any warranty or liability for your use of this information.

## 2020-03-22 NOTE — ED TRIAGE NOTES
Pt. [facility] and eye pain. Pt. States has been constant with headache for 3 days, has had some vomiting after dinner, like reflux.

## 2020-03-23 ENCOUNTER — PATIENT OUTREACH (OUTPATIENT)
Dept: CARDIOLOGY CLINIC | Age: 55
End: 2020-03-23

## 2020-03-23 NOTE — PROGRESS NOTES
COVID-19 Screening Initial Follow-up Note    Patient contacted regarding COVID-19  risk. Care Transition Nurse/ Ambulatory Care Manager contacted the patient by telephone to perform post discharge assessment. Verified name and  with patient as identifiers. Provided introduction to self, and explanation of the CTN/ACM role, and reason for call due to risk factors for infection and/or exposure to COVID-19. Symptoms reviewed with patient who verbalized the following symptoms: no new/worsening symptoms       Due to no new or worsening symptoms encounter was not routed to provider for escalation. Patient has following risk factors of: CAD-HTN, DM2, COPD, asthma. CTN/ACM reviewed discharge instructions, medical action plan and red flags such as increased shortness of breath, increasing fever and signs of decompensation with patient who verbalized understanding. Discussed exposure protocols and quarantine with CDC Guidelines What to do if you are sick with coronavirus disease 2019 Patient who was given an opportunity for questions and concerns. The patient agrees to contact the [facility] exposure line [phone], local [facility] department  [facility]  ([phone] and PCP office for questions related to their healthcare. CTN/ACM provided contact information for future reference.     Reviewed and educated patient on any new and changed medications related to discharge diagnosis     Plan for follow-up call in 14 days based on severity of symptoms and risk factors

## 2020-03-24 LAB
BACTERIA SPEC CULT: NORMAL
SERVICE CMNT-IMP: NORMAL

## 2020-04-01 ENCOUNTER — VIRTUAL VISIT (OUTPATIENT)
Dept: FAMILY MEDICINE CLINIC | Age: 55
End: 2020-04-01

## 2020-04-01 VITALS — BODY MASS INDEX: 30.05 KG/M2 | RESPIRATION RATE: 16 BRPM | HEIGHT: 64 IN | WEIGHT: 176 LBS

## 2020-04-01 DIAGNOSIS — R71.8 MICROCYTOSIS: ICD-10-CM

## 2020-04-01 DIAGNOSIS — J30.89 ENVIRONMENTAL AND SEASONAL ALLERGIES: Primary | ICD-10-CM

## 2020-04-01 DIAGNOSIS — J45.40 MODERATE PERSISTENT ASTHMA WITHOUT COMPLICATION: ICD-10-CM

## 2020-04-01 DIAGNOSIS — J34.89 SINUS PRESSURE: ICD-10-CM

## 2020-04-01 NOTE — PROGRESS NOTES
Simon Ravi is a 49 y.o. male who was seen by synchronous (real-time) audio-video technology on 4/1/2020. Assessment & Plan:   Diagnoses and all orders for this visit:    1. Environmental and seasonal allergies    2. Sinus pressure    3. Moderate persistent asthma without complication    4. Microcytosis        Add claritin/zyrtec/allegra if not taking, continue with flonase  Saw allergist yesterday, asthma stable  Don't think need for abx is present now  Labs reviewed--microcytosis is noted, persistent and not associated with anemia      CPT Codes 26404-05000 for Established Patients may apply to this Telehealth Visit      Subjective:   Simon Ravi was seen for No chief complaint on file. march [facility], he was given medication azithromycin for his sinus infection and tooke for 5 days, he did much better on it and then 3-4 days after his symptoms resolved [facility] and he was given doxycycline, 100mg bid for 10 days, after 3-4 days he wasn't feeling well and went to the ER and went to the ER to be seen. He had lab work done and xray (Chest)  Continues to feel sinus pressure and discomfort  Went to the ER for headache and emesis as well  req to review all labs and imaging  CXR: unchanged since 2017, old granulomas, calcified, again noted  CMP: reviewed, no significant abnormalities, cr improved from last check, LFT normal  CBC: consistent microcytosis without anemia--suspect genetic variant rather than iron def given no hx of anemia  Trop: neg  Strep: Neg    Prior to Admission medications    Medication Sig Start Date End Date Taking? Authorizing Provider   mometasone-formoterol Narcisa Harper) 200-5 mcg/actuation HFA inhaler INHALE 2 PUFFS BY MOUTH TWICE DAILY 3/12/20   Sanjuanita Gonzalez MD   pantoprazole (PROTONIX) 40 mg tablet Take 40 mg by mouth as needed. Provider, Historical   lisinopril (PRINIVIL, ZESTRIL) 10 mg tablet Take 1 Tab by mouth daily. 1/15/20   Ulysses Moon MD   rosuvastatin (CRESTOR) 10 mg tablet TAKE 1 TABLET BY MOUTH EVERY NIGHT 1/15/20   Ulysses Moon MD   fluticasone propionate (FLONASE) 50 mcg/actuation nasal spray USE 2 SPRAYS IN BOTH NOSTRILS EVERY DAY 1/10/20   Lashawn Young MD   tamsulosin (FLOMAX) 0.4 mg capsule Take 1 Cap by mouth daily. 1/10/20   Lsahawn Young MD   finasteride (PROSCAR) 5 mg tablet Take 1 Tab by mouth daily. 1/10/20   Lashawn Young MD   montelukast (SINGULAIR) 10 mg tablet Take 1 Tab by mouth daily. 1/10/20   Lashawn Young MD   levocetirizine (XYZAL) 5 mg tablet Take 1 Tab by mouth two (2) times a day. 9/24/19   Provider, Historical   metFORMIN ER (GLUCOPHAGE XR) 500 mg tablet TAKE 1 TABLET BY MOUTH EVERY DAY 10/17/19   Ulysses Moon MD   cpap machine kit by Does Not Apply route. Provider, Historical   albuterol (VENTOLIN HFA) 90 mcg/actuation inhaler INHALE 1 TO 2 PUFFS BY MOUTH EVERY 4 HOURS AS NEEDED FOR WHEEZING 3/20/19   Scottie Cabrales MD   fexofenadine (ALLEGRA) 180 mg tablet TK 1 T PO QD PRN 2/21/19   Provider, Historical     Allergies   Allergen Reactions    Pcn [Penicillins] Other (comments)     Father told him as child he was allergic to PCN. He does not know if he has every been on a cephalosporin that he has tolerated       Past Medical History:   Diagnosis Date    Allergic rhinitis 2/21/2014    Asthma     Benign essential HTN 2/24/2019    Diabetes ([facility] [address]     History of seasonal allergies     Newly converted positive PPD test 9/26/2012    KAYCE (obstructive sleep apnea)     Uses nightly CPAP machine. Follows with Dr. Corbin Fox ([facility]), annual visits     Past Surgical History:   Procedure Laterality Date    HX ORTHOPAEDIC      L arm surgery R/T broken bone    HX OTHER SURGICAL      R eye surg to remove spot on eye.      Family History   Problem Relation Age of Onset    No Known Problems Mother     No Known Problems Father      Social History     Tobacco Use  Smoking status: Never Smoker    Smokeless tobacco: Never Used   Substance Use Topics    Alcohol use: No          ROS  A 12 point review of systems was negative except as noted here or in the HPI. Objective:     General: alert, cooperative, no distress   Mental  status: mental status: alert, oriented to person, place, and time, normal mood, behavior, speech, dress, motor activity, and thought processes   Resp: resp: normal effort and no respiratory distress   Neuro: neuro: no gross deficits   Skin: skin: no discoloration or lesions of concern on visible areas     Due to this being a TeleHealth evaluation, many elements of the physical examination are unable to be assessed. We discussed the expected course, resolution and complications of the diagnosis(es) in detail. Medication risks, benefits, costs, interactions, and alternatives were discussed as indicated. I advised him to contact the office if his condition worsens, changes or fails to improve as anticipated. He expressed understanding with the diagnosis(es) and plan. Pursuant to the emergency declaration under the [facility][note 3][facility], [facility] waiver authority and the [employer] Act, this Virtual  Visit was conducted, with patient's consent, to reduce the patient's risk of exposure to COVID-19 and provide continuity of care for an established patient. Services were provided through a video synchronous discussion virtually to substitute for in-person clinic visit.     Orion Durán MD

## 2020-04-07 ENCOUNTER — PATIENT OUTREACH (OUTPATIENT)
Dept: CARDIOLOGY CLINIC | Age: 55
End: 2020-04-07

## 2020-04-07 NOTE — PROGRESS NOTES
Patient resolved from Transition of Care episode on 4/7/20. Patient/family has been provided the following resources and education related to COVID-19:                         Signs, symptoms and red flags related to COVID-19            CDC exposure and quarantine guidelines            [facility] exposure contact - [phone]            Contact for their local Department of Health                 Patient currently reports that the following symptoms have improved:  cough, no new/worsening symptoms     No further outreach scheduled with this CTN/ACM. Episode of Care resolved. Patient has this CTN/ACM contact information if future needs arise.

## 2020-04-24 ENCOUNTER — TELEPHONE (OUTPATIENT)
Dept: ENDOCRINOLOGY | Age: 55
End: 2020-04-24

## 2020-04-24 NOTE — TELEPHONE ENCOUNTER
----- Message from Emily Desouza sent at 4/24/2020  2:28 PM EDT -----  Regarding: Dr. Minnie Qiu first and last name:  pt    Reason for call:  Requesting to r/s upcoming appt    Callback required yes/no and why:  yes    Best contact number(s):  [phone]    Details to clarify the request:  Requesting lab orders. Pt would like to be seen before 06/10/2020 as long as it will be covered by insurance. Requesting an appt in 05/2020 Pt requesting to speak with the provider in regards to fasting starting today for Ramadan and inquiring on the effect it could have on medication. Pt also stated he is willing and able to telemedicine appt.       Emily Desouza

## 2020-04-24 NOTE — TELEPHONE ENCOUNTER
Called them to discuss they were wanting lab work order to be mailed so I gave them mid may to give orders time to arrive but still want to talk to Dr. Yanely Crisostomo regarding bellow message

## 2020-05-13 LAB
BUN SERPL-MCNC: 9 MG/DL (ref 6–24)
BUN/CREAT SERPL: 6 (ref 9–20)
CALCIUM SERPL-MCNC: 9.1 MG/DL (ref 8.7–10.2)
CHLORIDE SERPL-SCNC: 107 MMOL/L (ref 96–106)
CO2 SERPL-SCNC: 19 MMOL/L (ref 20–29)
CREAT SERPL-MCNC: 1.4 MG/DL (ref 0.76–1.27)
EST. AVERAGE GLUCOSE BLD GHB EST-MCNC: 134 MG/DL
GLUCOSE SERPL-MCNC: 105 MG/DL (ref 65–99)
HBA1C MFR BLD: 6.3 % (ref 4.8–5.6)
INTERPRETATION: NORMAL
Lab: NORMAL
POTASSIUM SERPL-SCNC: 5 MMOL/L (ref 3.5–5.2)
SODIUM SERPL-SCNC: 137 MMOL/L (ref 134–144)

## 2020-05-18 PROBLEM — L50.8 CHRONIC URTICARIA: Status: ACTIVE | Noted: 2020-05-18

## 2020-05-19 ENCOUNTER — VIRTUAL VISIT (OUTPATIENT)
Dept: ENDOCRINOLOGY | Age: 55
End: 2020-05-19

## 2020-05-19 DIAGNOSIS — E11.21 TYPE 2 DIABETES WITH NEPHROPATHY (HCC): Primary | ICD-10-CM

## 2020-05-19 DIAGNOSIS — I10 BENIGN ESSENTIAL HTN: ICD-10-CM

## 2020-05-19 DIAGNOSIS — E78.00 HYPERCHOLESTEROLEMIA: ICD-10-CM

## 2020-05-19 NOTE — PROGRESS NOTES
Osmar Mooney MD        Patient Information  Date:5/19/2020  Name : Crow Resendiz 49 y.o.     YOB: 1965         Referred by: Marjorie Carrera MD       Pursuant to the emergency declaration under the [facility] [place] [address] waiver authority and the [employer] Act, this Virtual  Visit was conducted, with patient's consent, to reduce the patient's risk of exposure to COVID-19 . Patient  is aware that this is a billable encounter and is responsible for copays/deductibles       Services were provided through a video synchronous discussion virtually to substitute for in-person clinic visit. Place of service: Provider : Office  Patient: Home  Chief Complaint   Patient presents with    Diabetes       History of Present Illness: Crow Resendiz is a 49 y.o. male here for fu  of  Type 2 Diabetes Mellitus. Type 2 Diabetes was diagnosed in 2016 . End organ effects of diabetes: none.     Cardiovascular risk factors: dyslipidemia, diabetes mellitus, male gender   He is taking the medications consistently  Eating healthy, walking  No weight loss  Followed by urologist, recent UTI, was on antibiotics  On medications for BPH  Generalized itching: Seen allergist/dermatology Dr. Gonsalo Hillman, on antihistamines  No fever, cough      Hypoglycemia: no    Taking metformin consistently        Wt Readings from Last 3 Encounters:   04/01/20 176 lb (79.8 kg)   03/21/20 176 lb (79.8 kg)   01/16/20 180 lb 12.8 oz (82 kg)       BP Readings from Last 3 Encounters:   03/21/20 138/83   01/16/20 104/65   01/15/20 121/69           Past Medical History:   Diagnosis Date    Allergic rhinitis 2/21/2014    Asthma     Benign essential HTN 2/24/2019    Diabetes ([facility] [address]     History of seasonal allergies     Newly converted positive PPD test 9/26/2012    KAYCE (obstructive sleep apnea)     Uses nightly CPAP machine. Follows with Dr. Lennox Duhamel ([facility]), annual visits     Current Outpatient Medications   Medication Sig    mometasone-formoterol (Dulera) 200-5 mcg/actuation HFA inhaler INHALE 2 PUFFS BY MOUTH TWICE DAILY    pantoprazole (PROTONIX) 40 mg tablet Take 40 mg by mouth as needed.  lisinopril (PRINIVIL, ZESTRIL) 10 mg tablet Take 1 Tab by mouth daily.  rosuvastatin (CRESTOR) 10 mg tablet TAKE 1 TABLET BY MOUTH EVERY NIGHT    fluticasone propionate (FLONASE) 50 mcg/actuation nasal spray USE 2 SPRAYS IN BOTH NOSTRILS EVERY DAY    tamsulosin (FLOMAX) 0.4 mg capsule Take 1 Cap by mouth daily.  finasteride (PROSCAR) 5 mg tablet Take 1 Tab by mouth daily.  montelukast (SINGULAIR) 10 mg tablet Take 1 Tab by mouth daily.  levocetirizine (XYZAL) 5 mg tablet Take 1 Tab by mouth two (2) times a day.  metFORMIN ER (GLUCOPHAGE XR) 500 mg tablet TAKE 1 TABLET BY MOUTH EVERY DAY    cpap machine kit by Does Not Apply route.  albuterol (VENTOLIN HFA) 90 mcg/actuation inhaler INHALE 1 TO 2 PUFFS BY MOUTH EVERY 4 HOURS AS NEEDED FOR WHEEZING    fexofenadine (ALLEGRA) 180 mg tablet TK 1 T PO QD PRN     No current facility-administered medications for this visit. Allergies   Allergen Reactions    Pcn [Penicillins] Other (comments)     Father told him as child he was allergic to PCN. He does not know if he has every been on a cephalosporin that he has tolerated         Review of Systems:  -   - Per HPI    Physical Examination:   There were no vitals taken for this visit. Estimated body mass index is 30.21 kg/m² as calculated from the following:    Height as of 4/1/20: 5' 4\" (1.626 m). -   Weight as of 4/1/20: 176 lb (79.8 kg).   - General: pleasant, no distress, good eye contact  - HEENT: no exophthalmos, no periorbital edema, EOMI  - Neck: No visible thyromegaly  - RS: Normal respiratory effort  - Musculoskeletal: no tremors  - Neurological: alert and oriented  - Psychiatric: normal mood and affect  - Skin: Normal color      Data Reviewed:     Diabetic foot exam: January 2019    Left:     Vibratory sensation normal    Filament test normal sensation with micro filament   Pulse DP: 1+    Deformities: None  Right:    Vibratory sensation normal   Filament test normal sensation with micro filament   Pulse DP: 1+   Deformities: None        Lab Results   Component Value Date/Time    Hemoglobin A1c 6.3 (H) 05/12/2020 11:04 AM    Hemoglobin A1c 6.2 (H) 01/07/2020 08:10 AM    Hemoglobin A1c 6.1 (H) 09/03/2019 09:56 AM    Hemoglobin A1c, External 7.4 09/23/2016    Glucose 105 (H) 05/12/2020 11:04 AM    Glucose (POC) 124 (H) 12/31/2013 10:32 PM    Microalb/Creat ratio (ug/mg creat.) <2.3 01/07/2020 08:10 AM    LDL, calculated 100 (H) 01/07/2020 08:10 AM    Creatinine (POC) 1.3 12/31/2013 10:32 PM    Creatinine 1.40 (H) 05/12/2020 11:04 AM      Lab Results   Component Value Date/Time    Cholesterol, total 163 01/07/2020 08:10 AM    HDL Cholesterol 40 01/07/2020 08:10 AM    LDL, calculated 100 (H) 01/07/2020 08:10 AM    Triglyceride 113 01/07/2020 08:10 AM       Lab Results   Component Value Date/Time    ALT (SGPT) 25 03/21/2020 10:46 PM    AST (SGOT) 24 03/21/2020 10:46 PM    Alk. phosphatase 64 03/21/2020 10:46 PM    Bilirubin, direct 0.27 08/14/2017 09:08 AM    Bilirubin, total 0.9 03/21/2020 10:46 PM       Lab Results   Component Value Date/Time    GFR est AA 66 05/12/2020 11:04 AM    GFR est non-AA 57 (L) 05/12/2020 11:04 AM    Creatinine (POC) 1.3 12/31/2013 10:32 PM    Creatinine 1.40 (H) 05/12/2020 11:04 AM    BUN 9 05/12/2020 11:04 AM    BUN (POC) 13 12/31/2013 10:32 PM    Sodium (POC) 140 12/31/2013 10:32 PM    Sodium 137 05/12/2020 11:04 AM    Potassium 5.0 05/12/2020 11:04 AM    Potassium (POC) 3.2 (L) 12/31/2013 10:32 PM    Chloride (POC) 105 12/31/2013 10:32 PM    Chloride 107 (H) 05/12/2020 11:04 AM    CO2 19 (L) 05/12/2020 11:04 AM        Assessment/Plan:     1.  Type 2 diabetes with nephropathy ([facility] [address]    2. Benign essential HTN    3. Hypercholesterolemia        1. Type 2 Diabetes Mellitus   Lab Results   Component Value Date/Time    Hemoglobin A1c 6.3 (H) 05/12/2020 11:04 AM    Hemoglobin A1c (POC) 5.9 08/21/2017 12:33 PM    Hemoglobin A1c, External 7.4 09/23/2016     Controlled    Diabetic issues reviewed : glycemic goals ,  low carbohydrate diet, weight control , home glucose monitoring emphasized,      2. Hyperlipidemia : Statin   weight loss encouraged        4. Obesity:There is no height or weight on file to calculate BMI. Discussed about the importance of exercise and carbohydrate portion control. 5.  Itching rash: Followed by allergist    Monitor renal parameters    BPH    There are no Patient Instructions on file for this visit. Thank you for allowing me to participate in the care of this patient.     Charli Coronel MD      Patient verbalized understanding

## 2020-05-19 NOTE — PROGRESS NOTES
Simon Ravi is a 49 y.o. male here for   Chief Complaint   Patient presents with    Diabetes     Pt consented to virtual visit. 1. Have you been to the ER, urgent care clinic since your last visit? Hospitalized since your last visit? -[facility] 1.5 months ago for sinus    2. Have you seen or consulted any other health care providers outside of the [address] [place] [address] since your last visit?   Include any pap smears or colon screening.-[facility]    Order placed for pt per verbal order with read back from Dr. Shalonda Moscoso 05/19/20

## 2020-06-01 DIAGNOSIS — J45.40 MODERATE PERSISTENT ASTHMA WITHOUT COMPLICATION: ICD-10-CM

## 2020-06-02 RX ORDER — ALBUTEROL SULFATE 90 UG/1
AEROSOL, METERED RESPIRATORY (INHALATION)
Qty: 18 G | Refills: 0 | Status: SHIPPED | OUTPATIENT
Start: 2020-06-02 | End: 2022-07-05 | Stop reason: SDUPTHER

## 2020-09-01 DIAGNOSIS — E78.00 HYPERCHOLESTEROLEMIA: ICD-10-CM

## 2020-09-01 DIAGNOSIS — I10 BENIGN ESSENTIAL HTN: ICD-10-CM

## 2020-09-01 DIAGNOSIS — E11.21 TYPE 2 DIABETES WITH NEPHROPATHY (HCC): ICD-10-CM

## 2020-09-22 LAB
ALBUMIN SERPL-MCNC: 4 G/DL (ref 3.8–4.9)
ALBUMIN/CREAT UR: <8 MG/G CREAT (ref 0–29)
ALBUMIN/GLOB SERPL: 1.8 {RATIO} (ref 1.2–2.2)
ALP SERPL-CCNC: 67 IU/L (ref 39–117)
ALT SERPL-CCNC: 13 IU/L (ref 0–44)
AST SERPL-CCNC: 15 IU/L (ref 0–40)
BILIRUB SERPL-MCNC: 0.9 MG/DL (ref 0–1.2)
BUN SERPL-MCNC: 12 MG/DL (ref 6–24)
BUN/CREAT SERPL: 10 (ref 9–20)
CALCIUM SERPL-MCNC: 9.2 MG/DL (ref 8.7–10.2)
CHLORIDE SERPL-SCNC: 105 MMOL/L (ref 96–106)
CHOLEST SERPL-MCNC: 153 MG/DL (ref 100–199)
CO2 SERPL-SCNC: 22 MMOL/L (ref 20–29)
CREAT SERPL-MCNC: 1.21 MG/DL (ref 0.76–1.27)
CREAT UR-MCNC: 38.9 MG/DL
EST. AVERAGE GLUCOSE BLD GHB EST-MCNC: 123 MG/DL
GLOBULIN SER CALC-MCNC: 2.2 G/DL (ref 1.5–4.5)
GLUCOSE SERPL-MCNC: 99 MG/DL (ref 65–99)
HBA1C MFR BLD: 5.9 % (ref 4.8–5.6)
HDLC SERPL-MCNC: 34 MG/DL
INTERPRETATION, 910389: NORMAL
LDLC SERPL CALC-MCNC: 95 MG/DL (ref 0–99)
Lab: NORMAL
MICROALBUMIN UR-MCNC: <3 UG/ML
POTASSIUM SERPL-SCNC: 5.5 MMOL/L (ref 3.5–5.2)
PROT SERPL-MCNC: 6.2 G/DL (ref 6–8.5)
SODIUM SERPL-SCNC: 137 MMOL/L (ref 134–144)
TRIGL SERPL-MCNC: 132 MG/DL (ref 0–149)
VLDLC SERPL CALC-MCNC: 24 MG/DL (ref 5–40)

## 2020-09-25 ENCOUNTER — TELEPHONE (OUTPATIENT)
Dept: FAMILY MEDICINE CLINIC | Age: 55
End: 2020-09-25

## 2020-09-25 NOTE — TELEPHONE ENCOUNTER
----- Message from [employer] sent at 9/24/2020 12:03 PM EDT -----  Regarding: Pilo Locke MD  General Message/Vendor Calls    Caller's first and last name: Hollis Colvin [[phone]]      Reason for call: [address] required yes/no and why: Yes      Best contact number(s): [phone]      Details to clarify the request: pt has questions regarding pcr test       [employer]

## 2020-09-25 NOTE — TELEPHONE ENCOUNTER
Called pt, and left a voice message, I was returning his call and asked that he call the office back when able.

## 2020-09-28 ENCOUNTER — OFFICE VISIT (OUTPATIENT)
Dept: ENDOCRINOLOGY | Age: 55
End: 2020-09-28
Payer: COMMERCIAL

## 2020-09-28 VITALS
SYSTOLIC BLOOD PRESSURE: 107 MMHG | HEART RATE: 87 BPM | WEIGHT: 167 LBS | BODY MASS INDEX: 28.51 KG/M2 | HEIGHT: 64 IN | TEMPERATURE: 98.1 F | DIASTOLIC BLOOD PRESSURE: 69 MMHG | RESPIRATION RATE: 16 BRPM

## 2020-09-28 DIAGNOSIS — E87.5 HYPERKALEMIA: ICD-10-CM

## 2020-09-28 DIAGNOSIS — E11.9 DIABETES MELLITUS WITHOUT COMPLICATION (HCC): ICD-10-CM

## 2020-09-28 DIAGNOSIS — E11.65 TYPE 2 DIABETES MELLITUS WITH HYPERGLYCEMIA, WITHOUT LONG-TERM CURRENT USE OF INSULIN (HCC): Primary | ICD-10-CM

## 2020-09-28 DIAGNOSIS — I10 ESSENTIAL HYPERTENSION: ICD-10-CM

## 2020-09-28 DIAGNOSIS — E78.2 MIXED HYPERLIPIDEMIA: ICD-10-CM

## 2020-09-28 DIAGNOSIS — I10 BENIGN ESSENTIAL HTN: ICD-10-CM

## 2020-09-28 DIAGNOSIS — E11.9 TYPE 2 DIABETES MELLITUS WITHOUT COMPLICATION, WITHOUT LONG-TERM CURRENT USE OF INSULIN (HCC): ICD-10-CM

## 2020-09-28 LAB — POTASSIUM SERPL-SCNC: 4.5 MMOL/L (ref 3.5–5.1)

## 2020-09-28 PROCEDURE — 99214 OFFICE O/P EST MOD 30 MIN: CPT | Performed by: INTERNAL MEDICINE

## 2020-09-28 RX ORDER — DOXYCYCLINE 100 MG/1
CAPSULE ORAL
COMMUNITY
Start: 2020-09-02 | End: 2021-01-28 | Stop reason: ALTCHOICE

## 2020-09-28 RX ORDER — LISINOPRIL 10 MG/1
10 TABLET ORAL DAILY
Qty: 90 TAB | Refills: 3 | Status: SHIPPED | OUTPATIENT
Start: 2020-09-28 | End: 2021-04-22 | Stop reason: SDUPTHER

## 2020-09-28 RX ORDER — ROSUVASTATIN CALCIUM 10 MG/1
TABLET, COATED ORAL
Qty: 90 TAB | Refills: 3 | Status: SHIPPED | OUTPATIENT
Start: 2020-09-28 | End: 2021-04-22 | Stop reason: SDUPTHER

## 2020-09-28 RX ORDER — LISINOPRIL AND HYDROCHLOROTHIAZIDE 20; 25 MG/1; MG/1
TABLET ORAL DAILY
COMMUNITY
End: 2020-09-28 | Stop reason: ALTCHOICE

## 2020-09-28 RX ORDER — METFORMIN HYDROCHLORIDE 500 MG/1
TABLET, EXTENDED RELEASE ORAL
Qty: 90 TAB | Refills: 3 | Status: SHIPPED | OUTPATIENT
Start: 2020-09-28 | End: 2021-02-19 | Stop reason: SDUPTHER

## 2020-09-28 NOTE — PROGRESS NOTES
Rizwana Kothari MD        Patient Information  Date:9/28/2020  Name : Markus Ortega 49 y.o.     YOB: 1965         Referred by: Elías Ballesteros MD         Chief Complaint   Patient presents with    Diabetes       History of Present Illness: Markus Ortega is a 49 y.o. male here for fu  of  Type 2 Diabetes Mellitus. Type 2 Diabetes was diagnosed in 2016 . End organ effects of diabetes: none. Cardiovascular risk factors: dyslipidemia, diabetes mellitus, male gender   He is taking the medications consistently  Eating healthy, walking/jogging   weight loss    K is high   GFR is better   Followed by urologist, on doxycycline which was started after the labs  Off medications for BPH  Generalized itching: Seen allergist/dermatology Dr. Stefan Driscoll, on antihistamines  No fever, cough      Hypoglycemia: no    Taking metformin consistently        Wt Readings from Last 3 Encounters:   09/28/20 167 lb (75.8 kg)   04/01/20 176 lb (79.8 kg)   03/21/20 176 lb (79.8 kg)       BP Readings from Last 3 Encounters:   09/28/20 107/69   03/21/20 138/83   01/16/20 104/65           Past Medical History:   Diagnosis Date    Allergic rhinitis 2/21/2014    Asthma     Benign essential HTN 2/24/2019    Diabetes ([facility] [address]     History of seasonal allergies     Newly converted positive PPD test 9/26/2012    KAYCE (obstructive sleep apnea)     Uses nightly CPAP machine.  Follows with Dr. Brian Ruiz ([facility]), annual visits     Current Outpatient Medications   Medication Sig    doxycycline (MONODOX) 100 mg capsule TAKE ONE CAPSULE TWO TIMES A DAY    albuterol (PROVENTIL HFA, VENTOLIN HFA, PROAIR HFA) 90 mcg/actuation inhaler INHALE 1 TO 2 PUFFS BY MOUTH EVERY 4 HOURS AS NEEDED FOR WHEEZING    mometasone-formoterol (Dulera) 200-5 mcg/actuation HFA inhaler INHALE 2 PUFFS BY MOUTH TWICE DAILY    fluticasone propionate (FLONASE) 50 mcg/actuation nasal spray USE 2 SPRAYS IN BOTH NOSTRILS EVERY DAY    cpap machine kit by Does Not Apply route.  rosuvastatin (CRESTOR) 10 mg tablet TAKE 1 TABLET BY MOUTH EVERY NIGHT    metFORMIN ER (GLUCOPHAGE XR) 500 mg tablet TAKE 1 TABLET BY MOUTH EVERY DAY    lisinopriL (PRINIVIL, ZESTRIL) 10 mg tablet Take 1 Tab by mouth daily. No current facility-administered medications for this visit. Allergies   Allergen Reactions    Pcn [Penicillins] Other (comments)     Father told him as child he was allergic to PCN. He does not know if he has every been on a cephalosporin that he has tolerated         Review of Systems:  -   - Per HPI    Physical Examination:   Blood pressure 107/69, pulse 87, temperature 98.1 °F (36.7 °C), temperature source Oral, resp. rate 16, height 5' 4\" (1.626 m), weight 167 lb (75.8 kg). Estimated body mass index is 28.67 kg/m² as calculated from the following:    Height as of this encounter: 5' 4\" (1.626 m). -   Weight as of this encounter: 167 lb (75.8 kg).   - General: pleasant, no distress, good eye contact  - HEENT: no exophthalmos, no periorbital edema, EOMI  - Neck: No visible thyromegaly  - RS: Normal respiratory effort  - Musculoskeletal: no tremors  - Neurological: alert and oriented  - Psychiatric: normal mood and affect  - Skin: Normal color      Data Reviewed:     Diabetic foot exam: January 2019    Left:     Vibratory sensation normal    Filament test normal sensation with micro filament   Pulse DP: 1+    Deformities: None  Right:    Vibratory sensation normal   Filament test normal sensation with micro filament   Pulse DP: 1+   Deformities: None        Lab Results   Component Value Date/Time    Hemoglobin A1c 5.9 (H) 09/21/2020 09:06 AM    Hemoglobin A1c 6.3 (H) 05/12/2020 11:04 AM    Hemoglobin A1c 6.2 (H) 01/07/2020 08:10 AM    Hemoglobin A1c, External 7.4 09/23/2016    Glucose 99 09/21/2020 09:06 AM    Glucose (POC) 124 (H) 12/31/2013 10:32 PM    Microalb/Creat ratio (ug/mg creat.) <8 09/21/2020 09:06 AM    LDL, calculated 100 (H) 01/07/2020 08:10 AM    LDL Chol Calc (NIH) 95 09/21/2020 09:06 AM    Creatinine (POC) 1.3 12/31/2013 10:32 PM    Creatinine 1.21 09/21/2020 09:06 AM      Lab Results   Component Value Date/Time    Cholesterol, total 153 09/21/2020 09:06 AM    HDL Cholesterol 34 (L) 09/21/2020 09:06 AM    LDL, calculated 100 (H) 01/07/2020 08:10 AM    LDL Chol Calc (NIH) 95 09/21/2020 09:06 AM    Triglyceride 132 09/21/2020 09:06 AM       Lab Results   Component Value Date/Time    ALT (SGPT) 13 09/21/2020 09:06 AM    Alk. phosphatase 67 09/21/2020 09:06 AM    Bilirubin, direct 0.27 08/14/2017 09:08 AM    Bilirubin, total 0.9 09/21/2020 09:06 AM       Lab Results   Component Value Date/Time    GFR est AA 79 09/21/2020 09:06 AM    GFR est non-AA 68 09/21/2020 09:06 AM    Creatinine (POC) 1.3 12/31/2013 10:32 PM    Creatinine 1.21 09/21/2020 09:06 AM    BUN 12 09/21/2020 09:06 AM    BUN (POC) 13 12/31/2013 10:32 PM    Sodium (POC) 140 12/31/2013 10:32 PM    Sodium 137 09/21/2020 09:06 AM    Potassium 5.5 (H) 09/21/2020 09:06 AM    Potassium (POC) 3.2 (L) 12/31/2013 10:32 PM    Chloride (POC) 105 12/31/2013 10:32 PM    Chloride 105 09/21/2020 09:06 AM    CO2 22 09/21/2020 09:06 AM        Assessment/Plan:     1. Type 2 diabetes mellitus with hyperglycemia, without long-term current use of insulin ([address]    2. Hyperkalemia    3. Essential hypertension    4. Mixed hyperlipidemia        1. Type 2 Diabetes Mellitus   Lab Results   Component Value Date/Time    Hemoglobin A1c 5.9 (H) 09/21/2020 09:06 AM    Hemoglobin A1c (POC) 5.9 08/21/2017 12:33 PM    Hemoglobin A1c, External 7.4 09/23/2016     Controlled    Diabetic issues reviewed : glycemic goals ,  low carbohydrate diet, weight control , home glucose monitoring emphasized,      2. Hyperlipidemia : Statin   weight loss commended        4. Obesity:Body mass index is 28.67 kg/m². Discussed about the importance of exercise and carbohydrate portion control.     5. Itching rash: Followed by allergist      6. Hyperkalemia: Recheck  Monitor renal parameters - lisinopril 5 mg     BPH    Patient Instructions   Choose lower potassium foods. High-potassium foods include bananas, oranges, potatoes, spinach and tomatoes. Examples of low-potassium foods include apples, cabbage, carrots, green beans, grapes and strawberries. Follow-up and Dispositions    · Return in about 4 months (around 1/28/2021). Thank you for allowing me to participate in the care of this patient.     Rossi Kowalski MD      Patient verbalized understanding

## 2020-09-28 NOTE — PROGRESS NOTES
Wt Readings from Last 3 Encounters:   09/28/20 167 lb (75.8 kg)   04/01/20 176 lb (79.8 kg)   03/21/20 176 lb (79.8 kg)     Temp Readings from Last 3 Encounters:   09/28/20 98.1 °F (36.7 °C) (Oral)   03/21/20 98.3 °F (36.8 °C)   01/16/20 97.4 °F (36.3 °C) (Oral)     BP Readings from Last 3 Encounters:   09/28/20 107/69   03/21/20 138/83   01/16/20 104/65     Pulse Readings from Last 3 Encounters:   09/28/20 87   03/21/20 91   01/16/20 97     Lab Results   Component Value Date/Time    Hemoglobin A1c 5.9 (H) 09/21/2020 09:06 AM    Hemoglobin A1c (POC) 5.9 08/21/2017 12:33 PM    Hemoglobin A1c, External 7.4 09/23/2016

## 2020-09-28 NOTE — PATIENT INSTRUCTIONS
Choose lower potassium foods. High-potassium foods include bananas, oranges, potatoes, spinach and tomatoes. Examples of low-potassium foods include apples, cabbage, carrots, green beans, grapes and strawberries.

## 2021-01-21 LAB
ALBUMIN SERPL-MCNC: 4 G/DL (ref 3.8–4.9)
ALBUMIN/CREAT UR: <3 MG/G CREAT (ref 0–29)
ALBUMIN/GLOB SERPL: 1.6 {RATIO} (ref 1.2–2.2)
ALP SERPL-CCNC: 59 IU/L (ref 39–117)
ALT SERPL-CCNC: 15 IU/L (ref 0–44)
AST SERPL-CCNC: 18 IU/L (ref 0–40)
BILIRUB SERPL-MCNC: 1 MG/DL (ref 0–1.2)
BUN SERPL-MCNC: 11 MG/DL (ref 6–24)
BUN/CREAT SERPL: 9 (ref 9–20)
CALCIUM SERPL-MCNC: 9 MG/DL (ref 8.7–10.2)
CHLORIDE SERPL-SCNC: 107 MMOL/L (ref 96–106)
CHOLEST SERPL-MCNC: 175 MG/DL (ref 100–199)
CO2 SERPL-SCNC: 24 MMOL/L (ref 20–29)
CREAT SERPL-MCNC: 1.18 MG/DL (ref 0.76–1.27)
CREAT UR-MCNC: 115.2 MG/DL
EST. AVERAGE GLUCOSE BLD GHB EST-MCNC: 120 MG/DL
GLOBULIN SER CALC-MCNC: 2.5 G/DL (ref 1.5–4.5)
GLUCOSE SERPL-MCNC: 95 MG/DL (ref 65–99)
HBA1C MFR BLD: 5.8 % (ref 4.8–5.6)
HDLC SERPL-MCNC: 34 MG/DL
INTERPRETATION, 910389: NORMAL
LDLC SERPL CALC-MCNC: 117 MG/DL (ref 0–99)
Lab: NORMAL
MICROALBUMIN UR-MCNC: <3 UG/ML
POTASSIUM SERPL-SCNC: 4.9 MMOL/L (ref 3.5–5.2)
PROT SERPL-MCNC: 6.5 G/DL (ref 6–8.5)
SODIUM SERPL-SCNC: 140 MMOL/L (ref 134–144)
TRIGL SERPL-MCNC: 134 MG/DL (ref 0–149)
VLDLC SERPL CALC-MCNC: 24 MG/DL (ref 5–40)

## 2021-01-28 ENCOUNTER — OFFICE VISIT (OUTPATIENT)
Dept: ENDOCRINOLOGY | Age: 56
End: 2021-01-28
Payer: COMMERCIAL

## 2021-01-28 VITALS
RESPIRATION RATE: 18 BRPM | DIASTOLIC BLOOD PRESSURE: 70 MMHG | OXYGEN SATURATION: 98 % | HEIGHT: 64 IN | WEIGHT: 173 LBS | BODY MASS INDEX: 29.53 KG/M2 | HEART RATE: 89 BPM | SYSTOLIC BLOOD PRESSURE: 106 MMHG | TEMPERATURE: 97.2 F

## 2021-01-28 DIAGNOSIS — E11.65 TYPE 2 DIABETES MELLITUS WITH HYPERGLYCEMIA, WITHOUT LONG-TERM CURRENT USE OF INSULIN (HCC): Primary | ICD-10-CM

## 2021-01-28 DIAGNOSIS — I10 BENIGN ESSENTIAL HTN: ICD-10-CM

## 2021-01-28 DIAGNOSIS — E78.00 HYPERCHOLESTEROLEMIA: ICD-10-CM

## 2021-01-28 PROCEDURE — 99214 OFFICE O/P EST MOD 30 MIN: CPT | Performed by: INTERNAL MEDICINE

## 2021-01-28 RX ORDER — BISMUTH SUBSALICYLATE 262 MG
1 TABLET,CHEWABLE ORAL DAILY
COMMUNITY

## 2021-01-28 NOTE — PROGRESS NOTES
Ryan Medeiros MD        Patient Information  Date:1/28/2021  Name : Marek Olvera 48 y.o.     YOB: 1965         Referred by: Lincoln Fermin MD         Chief Complaint   Patient presents with    Diabetes       History of Present Illness: Marek Olvera is a 48 y.o. male here for fu  of  Type 2 Diabetes Mellitus. Type 2 Diabetes was diagnosed in 2016 . End organ effects of diabetes: none. Cardiovascular risk factors: dyslipidemia, diabetes mellitus, male gender   He is taking the medications consistently  He has gained weight, not exercising has he was in the past  Potassium is improved  GFR is better     Generalized itching: Seen allergist/dermatology Dr. Wei Funes, on antihistamines  No fever, cough      Hypoglycemia: no    Taking metformin consistently        Wt Readings from Last 3 Encounters:   01/28/21 173 lb (78.5 kg)   09/28/20 167 lb (75.8 kg)   04/01/20 176 lb (79.8 kg)       BP Readings from Last 3 Encounters:   01/28/21 106/70   09/28/20 107/69   03/21/20 138/83           Past Medical History:   Diagnosis Date    Allergic rhinitis 2/21/2014    Asthma     Benign essential HTN 2/24/2019    Diabetes ([address]     History of seasonal allergies     Newly converted positive PPD test 9/26/2012    KAYCE (obstructive sleep apnea)     Uses nightly CPAP machine. Follows with Dr. Gay Palacios [facility]), annual visits     Current Outpatient Medications   Medication Sig    multivitamin (ONE A DAY) tablet Take 1 Tab by mouth daily.  rosuvastatin (CRESTOR) 10 mg tablet TAKE 1 TABLET BY MOUTH EVERY NIGHT    metFORMIN ER (GLUCOPHAGE XR) 500 mg tablet TAKE 1 TABLET BY MOUTH EVERY DAY    lisinopriL (PRINIVIL, ZESTRIL) 10 mg tablet Take 1 Tab by mouth daily.     albuterol (PROVENTIL HFA, VENTOLIN HFA, PROAIR HFA) 90 mcg/actuation inhaler INHALE 1 TO 2 PUFFS BY MOUTH EVERY 4 HOURS AS NEEDED FOR WHEEZING    mometasone-formoterol (Dulera) 200-5 mcg/actuation HFA inhaler INHALE 2 PUFFS BY MOUTH TWICE DAILY    fluticasone propionate (FLONASE) 50 mcg/actuation nasal spray USE 2 SPRAYS IN BOTH NOSTRILS EVERY DAY    cpap machine kit by Does Not Apply route. No current facility-administered medications for this visit. Allergies   Allergen Reactions    Pcn [Penicillins] Other (comments)     Father told him as child he was allergic to PCN. He does not know if he has every been on a cephalosporin that he has tolerated         Review of Systems:  -   - Per HPI    Physical Examination:   Blood pressure 106/70, pulse 89, temperature 97.2 °F (36.2 °C), temperature source Oral, resp. rate 18, height 5' 4\" (1.626 m), weight 173 lb (78.5 kg), SpO2 98 %. Estimated body mass index is 29.7 kg/m² as calculated from the following:    Height as of this encounter: 5' 4\" (1.626 m). -   Weight as of this encounter: 173 lb (78.5 kg).   - General: pleasant, no distress, good eye contact  - HEENT: no exophthalmos, no periorbital edema, EOMI  - Neck: No thyromegaly  - CVS: S1-S2 regular  - RS: Normal respiratory effort  - Musculoskeletal: no tremors  - Neurological: alert and oriented  - Psychiatric: normal mood and affect  - Skin: Normal color      Data Reviewed:     Diabetic foot exam: January 2019    Left:     Vibratory sensation normal    Filament test normal sensation with micro filament   Pulse DP: 1+    Deformities: None  Right:    Vibratory sensation normal   Filament test normal sensation with micro filament   Pulse DP: 1+   Deformities: None        Lab Results   Component Value Date/Time    Hemoglobin A1c 5.8 (H) 01/20/2021 09:22 AM    Hemoglobin A1c 5.9 (H) 09/21/2020 09:06 AM    Hemoglobin A1c 6.3 (H) 05/12/2020 11:04 AM    Hemoglobin A1c, External 7.4 09/23/2016    Glucose 95 01/20/2021 09:22 AM    Glucose (POC) 124 (H) 12/31/2013 10:32 PM    Microalb/Creat ratio (ug/mg creat.) <3 01/20/2021 09:22 AM    LDL, calculated 117 (H) 01/20/2021 09:22 AM    LDL, calculated 100 (H) 01/07/2020 08:10 AM    Creatinine (POC) 1.3 12/31/2013 10:32 PM    Creatinine 1.18 01/20/2021 09:22 AM      Lab Results   Component Value Date/Time    Cholesterol, total 175 01/20/2021 09:22 AM    HDL Cholesterol 34 (L) 01/20/2021 09:22 AM    LDL, calculated 117 (H) 01/20/2021 09:22 AM    LDL, calculated 100 (H) 01/07/2020 08:10 AM    Triglyceride 134 01/20/2021 09:22 AM       Lab Results   Component Value Date/Time    ALT (SGPT) 15 01/20/2021 09:22 AM    Alk. phosphatase 59 01/20/2021 09:22 AM    Bilirubin, direct 0.27 08/14/2017 09:08 AM    Bilirubin, total 1.0 01/20/2021 09:22 AM       Lab Results   Component Value Date/Time    GFR est AA 80 01/20/2021 09:22 AM    GFR est non-AA 70 01/20/2021 09:22 AM    Creatinine (POC) 1.3 12/31/2013 10:32 PM    Creatinine 1.18 01/20/2021 09:22 AM    BUN 11 01/20/2021 09:22 AM    BUN (POC) 13 12/31/2013 10:32 PM    Sodium (POC) 140 12/31/2013 10:32 PM    Sodium 140 01/20/2021 09:22 AM    Potassium 4.9 01/20/2021 09:22 AM    Potassium (POC) 3.2 (L) 12/31/2013 10:32 PM    Chloride (POC) 105 12/31/2013 10:32 PM    Chloride 107 (H) 01/20/2021 09:22 AM    CO2 24 01/20/2021 09:22 AM        Assessment/Plan:     1. Type 2 diabetes mellitus with hyperglycemia, without long-term current use of insulin ([address]    2. Benign essential HTN    3. Hypercholesterolemia        1. Type 2 Diabetes Mellitus   Lab Results   Component Value Date/Time    Hemoglobin A1c 5.8 (H) 01/20/2021 09:22 AM    Hemoglobin A1c (POC) 5.9 08/21/2017 12:33 PM    Hemoglobin A1c, External 7.4 09/23/2016     Controlled    Diabetic issues reviewed : glycemic goals ,  low carbohydrate diet, weight control , home glucose monitoring emphasized,      2. Hyperlipidemia : Statin         4. Obesity:Body mass index is 29.7 kg/m². Discussed about the importance of exercise and carbohydrate portion control. 5.  Itching rash: Followed by allergist    #6.   BPH    Lipoma    There are no Patient Instructions on file for this visit. Thank you for allowing me to participate in the care of this patient.     Lars Le MD      Patient verbalized understanding

## 2021-01-28 NOTE — LETTER
1/28/2021 Patient: Ivory Nichols  
YOB: 1965 Date of Visit: 1/28/2021 Neeru Jackson MD 
[address] [place] [id number] [address] [employer] Dear Neeru Jackson MD, Thank you for referring Mr. Ivory Nichols to [facility] for evaluation. My notes for this consultation are attached. If you have questions, please do not hesitate to call me. I look forward to following your patient along with you. Sincerely, Rocky De La O MD

## 2021-01-28 NOTE — PROGRESS NOTES
Sebastian Sears is a 48 y.o. male here for   Chief Complaint   Patient presents with    Diabetes       1. Have you been to the ER, urgent care clinic since your last visit? Hospitalized since your last visit? -no    2. Have you seen or consulted any other health care providers outside of the [address] [place] [address] since your last visit?   Include any pap smears or colon screening.-[facility]

## 2021-02-19 DIAGNOSIS — E11.9 TYPE 2 DIABETES MELLITUS WITHOUT COMPLICATION, WITHOUT LONG-TERM CURRENT USE OF INSULIN (HCC): ICD-10-CM

## 2021-02-19 RX ORDER — METFORMIN HYDROCHLORIDE 500 MG/1
TABLET, EXTENDED RELEASE ORAL
Qty: 90 TAB | Refills: 3 | Status: SHIPPED | OUTPATIENT
Start: 2021-02-19 | End: 2022-04-18

## 2021-03-25 DIAGNOSIS — J45.40 MODERATE PERSISTENT ASTHMA WITHOUT COMPLICATION: ICD-10-CM

## 2021-03-27 RX ORDER — FLUTICASONE PROPIONATE 50 MCG
SPRAY, SUSPENSION (ML) NASAL
Qty: 1 BOTTLE | Refills: 3 | Status: SHIPPED | OUTPATIENT
Start: 2021-03-27 | End: 2021-04-22 | Stop reason: SDUPTHER

## 2021-03-30 NOTE — TELEPHONE ENCOUNTER
Called and spoke with pt, and he has been advised of the need for an appointment. Pt states he will call office back to schedule.

## 2021-04-22 ENCOUNTER — VIRTUAL VISIT (OUTPATIENT)
Dept: FAMILY MEDICINE CLINIC | Age: 56
End: 2021-04-22
Payer: COMMERCIAL

## 2021-04-22 ENCOUNTER — TELEPHONE (OUTPATIENT)
Dept: FAMILY MEDICINE CLINIC | Age: 56
End: 2021-04-22

## 2021-04-22 DIAGNOSIS — E11.9 DIABETES MELLITUS WITHOUT COMPLICATION (HCC): ICD-10-CM

## 2021-04-22 DIAGNOSIS — J45.901 EXACERBATION OF ASTHMA, UNSPECIFIED ASTHMA SEVERITY, UNSPECIFIED WHETHER PERSISTENT: ICD-10-CM

## 2021-04-22 DIAGNOSIS — J98.8 VIRAL RESPIRATORY ILLNESS: Primary | ICD-10-CM

## 2021-04-22 DIAGNOSIS — E78.00 HYPERCHOLESTEROLEMIA: ICD-10-CM

## 2021-04-22 DIAGNOSIS — J45.40 MODERATE PERSISTENT ASTHMA WITHOUT COMPLICATION: ICD-10-CM

## 2021-04-22 DIAGNOSIS — I10 BENIGN ESSENTIAL HTN: ICD-10-CM

## 2021-04-22 DIAGNOSIS — B97.89 VIRAL RESPIRATORY ILLNESS: Primary | ICD-10-CM

## 2021-04-22 PROCEDURE — 99443 PR PHYS/QHP TELEPHONE EVALUATION 21-30 MIN: CPT | Performed by: FAMILY MEDICINE

## 2021-04-22 RX ORDER — MONTELUKAST SODIUM 10 MG/1
10 TABLET ORAL DAILY
COMMUNITY
End: 2021-04-22 | Stop reason: SDUPTHER

## 2021-04-22 RX ORDER — LISINOPRIL 10 MG/1
10 TABLET ORAL DAILY
Qty: 90 TAB | Refills: 2 | Status: SHIPPED | OUTPATIENT
Start: 2021-04-22 | End: 2022-04-17

## 2021-04-22 RX ORDER — FLUTICASONE PROPIONATE 50 MCG
SPRAY, SUSPENSION (ML) NASAL
Qty: 3 BOTTLE | Refills: 4 | Status: SHIPPED | OUTPATIENT
Start: 2021-04-22 | End: 2022-10-18 | Stop reason: SDUPTHER

## 2021-04-22 RX ORDER — MONTELUKAST SODIUM 10 MG/1
10 TABLET ORAL DAILY
Qty: 90 TAB | Refills: 0 | Status: SHIPPED | OUTPATIENT
Start: 2021-04-22 | End: 2022-10-27 | Stop reason: SDUPTHER

## 2021-04-22 RX ORDER — ROSUVASTATIN CALCIUM 10 MG/1
TABLET, COATED ORAL
Qty: 90 TAB | Refills: 0 | Status: SHIPPED | OUTPATIENT
Start: 2021-04-22 | End: 2022-04-17

## 2021-04-22 RX ORDER — ALBUTEROL SULFATE 2.5 MG/.5ML
2.5 SOLUTION RESPIRATORY (INHALATION)
Qty: 30 NEBULE | Refills: 0 | Status: SHIPPED | OUTPATIENT
Start: 2021-04-22 | End: 2022-07-06 | Stop reason: SDUPTHER

## 2021-04-22 NOTE — PROGRESS NOTES
Ivelisse Garcia is a 48 y.o. male evaluated via telephone on 4/22/2021. Consent:  He and/or health care decision maker is aware that he may receive a bill for this telephone service, depending on his insurance coverage, and has provided verbal consent to proceed: Yes      Documentation:  I communicated with the patient and/or health care decision maker about respiratory symptoms and refills. Details of this discussion including any medical advice provided:       Subjective:   Ivelisse Garcia was seen for Fever (pt states he had a fever last night- does not know the degree ), Cough (little phelm - white and thick ), and Medication Refill      Patient presents with:  Fever: pt states he had a fever last night- does not know the degree   Cough: little phelm - white and thick   Medication Refill    His symptoms started 2 days ago, worse overnight. He has tried Singulair with some relief. His first COVID-19 immunization was 4/6/21. Evidently, his father is in [place] and was hospitalized. He has a plane ticket to go over there, but needs to cancel it. He was tested for COVID-19 yesterday, but he does not have the results back. Review of Systems   Constitutional: Positive for chills, fever and malaise/fatigue. HENT: Positive for congestion, ear pain and sore throat. No loss of taste or smell   Respiratory: Positive for cough and wheezing. Negative for sputum production and shortness of breath. Gastrointestinal: Negative for abdominal pain, diarrhea, heartburn, nausea and vomiting. Musculoskeletal: Positive for myalgias. Neurological: Positive for headaches. Objective:     BP Readings from Last 3 Encounters:   01/28/21 106/70   09/28/20 107/69   03/21/20 138/83           Assessment & Plan:   Diagnoses and all orders for this visit:    1. Viral respiratory illness    2. Exacerbation of asthma, unspecified asthma severity, unspecified whether persistent    3.  Moderate persistent asthma without complication  -     fluticasone propionate (FLONASE) 50 mcg/actuation nasal spray; SHAKE LIQUID AND USE 2 SPRAYS IN EACH NOSTRIL EVERY DAY  -     montelukast (Singulair) 10 mg tablet; Take 1 Tab by mouth daily. 4. Benign essential HTN  -     lisinopriL (PRINIVIL, ZESTRIL) 10 mg tablet; Take 1 Tab by mouth daily. 5. Diabetes mellitus without complication (HCC)  -     lisinopriL (PRINIVIL, ZESTRIL) 10 mg tablet; Take 1 Tab by mouth daily. 6. Hypercholesterolemia  -     rosuvastatin (CRESTOR) 10 mg tablet; TAKE 1 TABLET BY MOUTH EVERY NIGHT        Possible COVID-19 with asthma exacerbation  Await test results  Rest, push fluids  Tylenol or Advil prn  Scheduled albuterol for 3 days  Refills per orders    Follow-up and Dispositions    · Return in about 6 weeks (around 6/3/2021) for asthma - in office. Reviewed plan of care. Patient has provided input and agrees with goals. I affirm this is a Patient Initiated Episode with an Established Patient who has not had a related appointment within my department in the past 7 days or scheduled within the next 24 hours.     Total Time: minutes: 21-30 minutes    Note: not billable if this call serves to triage the patient into an appointment for the relevant concern      She Grijalva MD

## 2021-04-22 NOTE — PROGRESS NOTES
Chief Complaint   Patient presents with    Fever     pt states he had a fever last night- does not know the degree     Cough     little phelm - white and thick     Medication Refill     Pt states he has an airline ticket to travel overseas and due to symptoms unable to travel and will need a letter so he can obtain a refund.

## 2021-04-22 NOTE — TELEPHONE ENCOUNTER
Pt returned call to advise Dr. Xochilt Mccarthy he spoke with his  and as long as Dr. Xochilt Mccarthy says he's medically cleared to travel, no note is necessary.  Sarojm

## 2021-04-22 NOTE — TELEPHONE ENCOUNTER
Phone call with patient. He got his Singulair filled and he feels better. Now he is requesting a letter giving him permission to travel. Letter printed, he will call with his FAX number. COVID-19 testing will be positive due to his recent vaccine.

## 2021-04-22 NOTE — TELEPHONE ENCOUNTER
Pt called requesting letter to remove restrictions to travel stating he's feeling much better after taking the medicine prescribed at his appointment with Dr. Jasmin Haynes today. Pt also requests call back as soon as possible within the next hour because his father is very ill and  will charge additional fee if they don't receive letter today.  Alonzo

## 2021-04-22 NOTE — LETTER
4/22/2021 10:04 AM 
 
Mr. Barby Nava 
[address] [facility] [id number] To Whom it May Concern: 
 
Barby Nava is under my care. Due to his medical condition, he is unable to travel. Please let me know if you have any questions.  
 
 
 
 
Sincerely, 
 
 
Ritu Buchanan MD

## 2021-04-22 NOTE — PROGRESS NOTES
Kylee Pan is a 48 y.o. male who was seen by synchronous (real-time) audio-video technology on 4/22/2021. Assessment & Plan:  
Diagnoses and all orders for this visit: 1. Benign essential HTN 
-     lisinopriL (PRINIVIL, ZESTRIL) 10 mg tablet; Take 1 Tab by mouth daily. 2. Diabetes mellitus without complication (HCC) 
-     lisinopriL (PRINIVIL, ZESTRIL) 10 mg tablet; Take 1 Tab by mouth daily. 3. Type 2 diabetes mellitus without complication, without long-term current use of insulin (HCC) 
-     rosuvastatin (CRESTOR) 10 mg tablet; TAKE 1 TABLET BY MOUTH EVERY NIGHT 4. Moderate persistent asthma without complication 
-     fluticasone propionate (FLONASE) 50 mcg/actuation nasal spray; SHAKE LIQUID AND USE 2 SPRAYS IN EACH NOSTRIL EVERY DAY Other orders 
-     montelukast (Singulair) 10 mg tablet; Take 1 Tab by mouth daily. *** 
 
{Assessment and Plan:73393} Reviewed plan of care. Patient has provided input and agrees with goals. CPT Codes 24650-14316 for Established Patients may apply to this Telehealth Visit {Time-based coding, delete if not needed:17061::\"I spent at least 15 minutes with this established patient, and >50% of the time was spent counseling and/or coordinating care regarding ***\"} Subjective:  
Kylee Pan was seen for Fever (pt states he had a fever last night- does not know the degree ), Cough (little phelm - white and thick ), and Medication Refill HPI 
 
 
ROS Objective:  
 
Physical Exam 
 
Due to this being a TeleHealth evaluation, many elements of the physical examination are unable to be assessed. We discussed the expected course, resolution and complications of the diagnosis(es) in detail. Medication risks, benefits, costs, interactions, and alternatives were discussed as indicated. I advised him to contact the office if his condition worsens, changes or fails to improve as anticipated.  He expressed understanding with the diagnosis(es) and plan. Pursuant to the emergency declaration under the [facility], [facility] waiver authority and the [employer] Act, this Virtual  Visit was conducted, with patient's consent, to reduce the patient's risk of exposure to COVID-19 and provide continuity of care for an established patient. Services were provided through a video synchronous discussion virtually to substitute for in-person clinic visit.  
 
Amy Azar MD

## 2021-09-02 LAB
BUN SERPL-MCNC: 13 MG/DL (ref 6–24)
BUN/CREAT SERPL: 11 (ref 9–20)
CALCIUM SERPL-MCNC: 8.8 MG/DL (ref 8.7–10.2)
CHLORIDE SERPL-SCNC: 106 MMOL/L (ref 96–106)
CHOLEST SERPL-MCNC: 171 MG/DL (ref 100–199)
CO2 SERPL-SCNC: 22 MMOL/L (ref 20–29)
CREAT SERPL-MCNC: 1.18 MG/DL (ref 0.76–1.27)
EST. AVERAGE GLUCOSE BLD GHB EST-MCNC: 120 MG/DL
GLUCOSE SERPL-MCNC: 96 MG/DL (ref 65–99)
HBA1C MFR BLD: 5.8 % (ref 4.8–5.6)
HDLC SERPL-MCNC: 31 MG/DL
IMP & REVIEW OF LAB RESULTS: NORMAL
LDLC SERPL CALC-MCNC: 113 MG/DL (ref 0–99)
POTASSIUM SERPL-SCNC: 4.6 MMOL/L (ref 3.5–5.2)
SODIUM SERPL-SCNC: 140 MMOL/L (ref 134–144)
TRIGL SERPL-MCNC: 149 MG/DL (ref 0–149)
VLDLC SERPL CALC-MCNC: 27 MG/DL (ref 5–40)

## 2022-01-10 ENCOUNTER — TELEPHONE (OUTPATIENT)
Dept: ENDOCRINOLOGY | Age: 57
End: 2022-01-10

## 2022-01-10 DIAGNOSIS — E78.00 HYPERCHOLESTEROLEMIA: ICD-10-CM

## 2022-01-10 DIAGNOSIS — E11.65 TYPE 2 DIABETES MELLITUS WITH HYPERGLYCEMIA, WITHOUT LONG-TERM CURRENT USE OF INSULIN (HCC): Primary | ICD-10-CM

## 2022-01-10 NOTE — TELEPHONE ENCOUNTER
Order placed for pt,  per Verbal Order with read back from Dr Melissa Uriarte 1/10/2022  Mailed 01/10/2022.

## 2022-03-18 PROBLEM — E66.811 CLASS 1 OBESITY DUE TO EXCESS CALORIES WITH BODY MASS INDEX (BMI) OF 31.0 TO 31.9 IN ADULT: Status: ACTIVE | Noted: 2020-01-10

## 2022-03-18 PROBLEM — E66.09 CLASS 1 OBESITY DUE TO EXCESS CALORIES WITH BODY MASS INDEX (BMI) OF 31.0 TO 31.9 IN ADULT: Status: ACTIVE | Noted: 2020-01-10

## 2022-03-19 PROBLEM — L50.8 CHRONIC URTICARIA: Status: ACTIVE | Noted: 2020-05-18

## 2022-03-19 PROBLEM — E11.65 TYPE 2 DIABETES MELLITUS WITH HYPERGLYCEMIA, WITHOUT LONG-TERM CURRENT USE OF INSULIN (HCC): Status: ACTIVE | Noted: 2020-09-28

## 2022-03-19 PROBLEM — I10 BENIGN ESSENTIAL HTN: Status: ACTIVE | Noted: 2019-02-24

## 2022-03-20 PROBLEM — E11.21 TYPE 2 DIABETES WITH NEPHROPATHY (HCC): Status: ACTIVE | Noted: 2019-09-10

## 2022-04-17 DIAGNOSIS — E11.9 DIABETES MELLITUS WITHOUT COMPLICATION (HCC): ICD-10-CM

## 2022-04-17 DIAGNOSIS — E78.00 HYPERCHOLESTEROLEMIA: ICD-10-CM

## 2022-04-17 DIAGNOSIS — I10 BENIGN ESSENTIAL HTN: ICD-10-CM

## 2022-04-17 DIAGNOSIS — E11.9 TYPE 2 DIABETES MELLITUS WITHOUT COMPLICATION, WITHOUT LONG-TERM CURRENT USE OF INSULIN (HCC): ICD-10-CM

## 2022-04-17 RX ORDER — LISINOPRIL 10 MG/1
TABLET ORAL
Qty: 90 TABLET | Refills: 3 | Status: SHIPPED | OUTPATIENT
Start: 2022-04-17

## 2022-04-17 RX ORDER — ROSUVASTATIN CALCIUM 10 MG/1
TABLET, COATED ORAL
Qty: 90 TABLET | Refills: 0 | Status: SHIPPED | OUTPATIENT
Start: 2022-04-17

## 2022-04-17 NOTE — TELEPHONE ENCOUNTER
Please call patient and let them know I have given them one refill for now, but they need labs drawn prior to more. He should have an order from January.

## 2022-04-18 RX ORDER — METFORMIN HYDROCHLORIDE 500 MG/1
TABLET, EXTENDED RELEASE ORAL
Qty: 90 TABLET | Refills: 3 | Status: SHIPPED | OUTPATIENT
Start: 2022-04-18

## 2022-04-19 ENCOUNTER — TELEPHONE (OUTPATIENT)
Dept: FAMILY MEDICINE CLINIC | Age: 57
End: 2022-04-19

## 2022-04-19 LAB
BUN SERPL-MCNC: 13 MG/DL (ref 6–24)
BUN/CREAT SERPL: 11 (ref 9–20)
CALCIUM SERPL-MCNC: 9 MG/DL (ref 8.7–10.2)
CHLORIDE SERPL-SCNC: 103 MMOL/L (ref 96–106)
CHOLEST SERPL-MCNC: 184 MG/DL (ref 100–199)
CO2 SERPL-SCNC: 21 MMOL/L (ref 20–29)
CREAT SERPL-MCNC: 1.17 MG/DL (ref 0.76–1.27)
EGFR: 74 ML/MIN/1.73
EST. AVERAGE GLUCOSE BLD GHB EST-MCNC: 134 MG/DL
GLUCOSE SERPL-MCNC: 91 MG/DL (ref 65–99)
HBA1C MFR BLD: 6.3 % (ref 4.8–5.6)
HDLC SERPL-MCNC: 35 MG/DL
IMP & REVIEW OF LAB RESULTS: NORMAL
LDLC SERPL CALC-MCNC: 131 MG/DL (ref 0–99)
POTASSIUM SERPL-SCNC: 4.8 MMOL/L (ref 3.5–5.2)
SODIUM SERPL-SCNC: 138 MMOL/L (ref 134–144)
TRIGL SERPL-MCNC: 100 MG/DL (ref 0–149)
VLDLC SERPL CALC-MCNC: 18 MG/DL (ref 5–40)

## 2022-04-27 ENCOUNTER — OFFICE VISIT (OUTPATIENT)
Dept: ENDOCRINOLOGY | Age: 57
End: 2022-04-27
Payer: COMMERCIAL

## 2022-04-27 VITALS
SYSTOLIC BLOOD PRESSURE: 102 MMHG | OXYGEN SATURATION: 98 % | TEMPERATURE: 98.2 F | WEIGHT: 176 LBS | BODY MASS INDEX: 30.05 KG/M2 | RESPIRATION RATE: 20 BRPM | HEART RATE: 80 BPM | DIASTOLIC BLOOD PRESSURE: 64 MMHG | HEIGHT: 64 IN

## 2022-04-27 DIAGNOSIS — E11.65 TYPE 2 DIABETES MELLITUS WITH HYPERGLYCEMIA, WITHOUT LONG-TERM CURRENT USE OF INSULIN (HCC): Primary | ICD-10-CM

## 2022-04-27 DIAGNOSIS — E78.00 HYPERCHOLESTEROLEMIA: ICD-10-CM

## 2022-04-27 DIAGNOSIS — I10 BENIGN ESSENTIAL HTN: ICD-10-CM

## 2022-04-27 PROCEDURE — 3044F HG A1C LEVEL LT 7.0%: CPT | Performed by: INTERNAL MEDICINE

## 2022-04-27 PROCEDURE — 99214 OFFICE O/P EST MOD 30 MIN: CPT | Performed by: INTERNAL MEDICINE

## 2022-04-27 NOTE — PROGRESS NOTES
Lilly Vallecillo MD        Patient Information  Date:4/27/2022  Name : Demetrio Plaza 55 y.o.     YOB: 1965         Referred by: Claudeen Mormon, MD         Chief Complaint   Patient presents with    Diabetes       History of Present Illness: Demetrio Plaza is a 55 y.o. male here for fu  of  Type 2 Diabetes Mellitus. Type 2 Diabetes was diagnosed in 2016 . End organ effects of diabetes: none. Cardiovascular risk factors: dyslipidemia, diabetes mellitus, male gender   He is taking the medications consistently   not exercising has he was in the past      Generalized itching: Seen allergist/dermatology Dr. Annie Sprague, on antihistamines  No fever, cough      Hypoglycemia: no    Taking metformin consistently        Wt Readings from Last 3 Encounters:   04/27/22 176 lb (79.8 kg)   01/28/21 173 lb (78.5 kg)   09/28/20 167 lb (75.8 kg)       BP Readings from Last 3 Encounters:   04/27/22 102/64   01/28/21 106/70   09/28/20 107/69           Past Medical History:   Diagnosis Date    Allergic rhinitis 2/21/2014    Asthma     Benign essential HTN 2/24/2019    Diabetes ([facility] [address]     History of seasonal allergies     Newly converted positive PPD test 9/26/2012    KAYCE (obstructive sleep apnea)     Uses nightly CPAP machine. Follows with Dr. Gail Good ([facility]), annual visits     Current Outpatient Medications   Medication Sig    metFORMIN ER (GLUCOPHAGE XR) 500 mg tablet TAKE 1 TABLET BY MOUTH EVERY DAY    lisinopriL (PRINIVIL, ZESTRIL) 10 mg tablet TAKE 1 TABLET BY MOUTH EVERY DAY    rosuvastatin (CRESTOR) 10 mg tablet TAKE 1 TABLET BY MOUTH EVERY DAY AT NIGHT    fluticasone propionate (FLONASE) 50 mcg/actuation nasal spray SHAKE LIQUID AND USE 2 SPRAYS IN EACH NOSTRIL EVERY DAY    montelukast (Singulair) 10 mg tablet Take 1 Tab by mouth daily.  (Patient taking differently: Take 10 mg by mouth as needed.)    albuterol sulfate (PROVENTIL;VENTOLIN) 2.5 mg/0.5 mL nebu nebulizer solution 0.5 mL by Nebulization route every four (4) hours as needed for Wheezing or Respiratory Distress.  multivitamin (ONE A DAY) tablet Take 1 Tab by mouth daily.  albuterol (PROVENTIL HFA, VENTOLIN HFA, PROAIR HFA) 90 mcg/actuation inhaler INHALE 1 TO 2 PUFFS BY MOUTH EVERY 4 HOURS AS NEEDED FOR WHEEZING    mometasone-formoterol (Dulera) 200-5 mcg/actuation HFA inhaler INHALE 2 PUFFS BY MOUTH TWICE DAILY    cpap machine kit by Does Not Apply route. No current facility-administered medications for this visit. Allergies   Allergen Reactions    Pcn [Penicillins] Other (comments)     Father told him as child he was allergic to PCN. He does not know if he has every been on a cephalosporin that he has tolerated         Review of Systems:  -   - Per HPI    Physical Examination:   Blood pressure 102/64, pulse 80, temperature 98.2 °F (36.8 °C), temperature source Temporal, resp. rate 20, height 5' 4\" (1.626 m), weight 176 lb (79.8 kg), SpO2 98 %. Estimated body mass index is 30.21 kg/m² as calculated from the following:    Height as of this encounter: 5' 4\" (1.626 m). -   Weight as of this encounter: 176 lb (79.8 kg).   - General: pleasant, no distress, good eye contact  - HEENT: no exophthalmos, no periorbital edema, EOMI  - Neck: No thyromegaly  - CVS: S1-S2 regular  - RS: Normal respiratory effort  - Musculoskeletal: no tremors  - Neurological: alert and oriented  - Psychiatric: normal mood and affect  - Skin: Normal color      Data Reviewed:     Diabetic foot exam: January 2019    Left:     Vibratory sensation normal    Filament test normal sensation with micro filament   Pulse DP: 1+    Deformities: None  Right:    Vibratory sensation normal   Filament test normal sensation with micro filament   Pulse DP: 1+   Deformities: None        Lab Results   Component Value Date/Time    Hemoglobin A1c 6.3 (H) 04/18/2022 03:17 PM    Hemoglobin A1c 5.8 (H) 09/01/2021 08:30 AM Hemoglobin A1c 5.8 (H) 01/20/2021 09:22 AM    Hemoglobin A1c, External 7.4 09/23/2016 12:00 AM    Glucose 91 04/18/2022 03:17 PM    Glucose (POC) 124 (H) 12/31/2013 10:32 PM    Microalb/Creat ratio (ug/mg creat.) <3 01/20/2021 09:22 AM    LDL, calculated 131 (H) 04/18/2022 03:17 PM    LDL, calculated 100 (H) 01/07/2020 08:10 AM    Creatinine (POC) 1.3 12/31/2013 10:32 PM    Creatinine 1.17 04/18/2022 03:17 PM      Lab Results   Component Value Date/Time    Cholesterol, total 184 04/18/2022 03:17 PM    HDL Cholesterol 35 (L) 04/18/2022 03:17 PM    LDL, calculated 131 (H) 04/18/2022 03:17 PM    LDL, calculated 100 (H) 01/07/2020 08:10 AM    Triglyceride 100 04/18/2022 03:17 PM       Lab Results   Component Value Date/Time    ALT (SGPT) 15 01/20/2021 09:22 AM    Alk. phosphatase 59 01/20/2021 09:22 AM    Bilirubin, direct 0.27 08/14/2017 09:08 AM    Bilirubin, total 1.0 01/20/2021 09:22 AM       Lab Results   Component Value Date/Time    GFR est AA 80 09/01/2021 08:30 AM    GFR est non-AA 70 09/01/2021 08:30 AM    Creatinine (POC) 1.3 12/31/2013 10:32 PM    Creatinine 1.17 04/18/2022 03:17 PM    BUN 13 04/18/2022 03:17 PM    BUN (POC) 13 12/31/2013 10:32 PM    Sodium (POC) 140 12/31/2013 10:32 PM    Sodium 138 04/18/2022 03:17 PM    Potassium 4.8 04/18/2022 03:17 PM    Potassium (POC) 3.2 (L) 12/31/2013 10:32 PM    Chloride (POC) 105 12/31/2013 10:32 PM    Chloride 103 04/18/2022 03:17 PM    CO2 21 04/18/2022 03:17 PM        Assessment/Plan:     1. Type 2 diabetes mellitus with hyperglycemia, without long-term current use of insulin ([facility] [address]    2. Benign essential HTN    3. Hypercholesterolemia        1.  Type 2 Diabetes Mellitus   Lab Results   Component Value Date/Time    Hemoglobin A1c 6.3 (H) 04/18/2022 03:17 PM    Hemoglobin A1c (POC) 5.9 08/21/2017 12:33 PM    Hemoglobin A1c, External 7.4 09/23/2016 12:00 AM     At goal    Diabetic issues reviewed : glycemic goals ,  low carbohydrate diet, weight control , home glucose monitoring emphasized,      2. Hyperlipidemia : Statin         4. Obesity:Body mass index is 30.21 kg/m². Discussed about the importance of exercise and carbohydrate portion control. 5.  Itching rash: Followed by allergist    #6. BPH    Increase activity    There are no Patient Instructions on file for this visit. Follow-up and Dispositions    · Return in about 4 months (around 8/27/2022) for labs before next visit and follow up. Thank you for allowing me to participate in the care of this patient.     Ana Mantilla MD      Patient verbalized understanding

## 2022-04-27 NOTE — PROGRESS NOTES
Gonzales Faulkner is a 55 y.o. male here for   Chief Complaint   Patient presents with    Diabetes       1. Have you been to the ER, urgent care clinic since your last visit? Hospitalized since your last visit? -no    2. Have you seen or consulted any other health care providers outside of the [address] [place] [address] since your last visit?   Include any pap smears or colon screening.-cardiology Dr. Darryle Colder

## 2022-07-01 DIAGNOSIS — J45.40 MODERATE PERSISTENT ASTHMA WITHOUT COMPLICATION: ICD-10-CM

## 2022-07-04 RX ORDER — FLUTICASONE PROPIONATE 50 MCG
SPRAY, SUSPENSION (ML) NASAL
Refills: 4 | OUTPATIENT
Start: 2022-07-04

## 2022-07-05 DIAGNOSIS — J45.40 MODERATE PERSISTENT ASTHMA WITHOUT COMPLICATION: ICD-10-CM

## 2022-07-05 RX ORDER — ALBUTEROL SULFATE 90 UG/1
2 AEROSOL, METERED RESPIRATORY (INHALATION)
Qty: 18 G | Refills: 0 | Status: SHIPPED | OUTPATIENT
Start: 2022-07-05 | End: 2022-09-22

## 2022-07-07 RX ORDER — ALBUTEROL SULFATE 2.5 MG/.5ML
2.5 SOLUTION RESPIRATORY (INHALATION)
Qty: 30 NEBULE | Refills: 0 | Status: SHIPPED | OUTPATIENT
Start: 2022-07-07 | End: 2022-10-18

## 2022-08-13 DIAGNOSIS — J45.40 MODERATE PERSISTENT ASTHMA WITHOUT COMPLICATION: ICD-10-CM

## 2022-08-15 RX ORDER — ALBUTEROL SULFATE 90 UG/1
AEROSOL, METERED RESPIRATORY (INHALATION)
Qty: 18 EACH | OUTPATIENT
Start: 2022-08-15

## 2022-08-23 LAB
EST. AVERAGE GLUCOSE BLD GHB EST-MCNC: 128 MG/DL
HBA1C MFR BLD: 6.1 % (ref 4.8–5.6)
LDLC SERPL DIRECT ASSAY-MCNC: 120 MG/DL (ref 0–99)

## 2022-08-31 ENCOUNTER — OFFICE VISIT (OUTPATIENT)
Dept: ENDOCRINOLOGY | Age: 57
End: 2022-08-31
Payer: COMMERCIAL

## 2022-08-31 VITALS
BODY MASS INDEX: 29.02 KG/M2 | TEMPERATURE: 98.1 F | SYSTOLIC BLOOD PRESSURE: 101 MMHG | RESPIRATION RATE: 20 BRPM | WEIGHT: 170 LBS | OXYGEN SATURATION: 98 % | HEIGHT: 64 IN | HEART RATE: 88 BPM | DIASTOLIC BLOOD PRESSURE: 62 MMHG

## 2022-08-31 DIAGNOSIS — E78.2 MIXED HYPERLIPIDEMIA: ICD-10-CM

## 2022-08-31 DIAGNOSIS — I10 BENIGN ESSENTIAL HTN: ICD-10-CM

## 2022-08-31 DIAGNOSIS — E11.65 TYPE 2 DIABETES MELLITUS WITH HYPERGLYCEMIA, WITHOUT LONG-TERM CURRENT USE OF INSULIN (HCC): Primary | ICD-10-CM

## 2022-08-31 PROCEDURE — 3044F HG A1C LEVEL LT 7.0%: CPT | Performed by: INTERNAL MEDICINE

## 2022-08-31 PROCEDURE — 99214 OFFICE O/P EST MOD 30 MIN: CPT | Performed by: INTERNAL MEDICINE

## 2022-08-31 NOTE — PROGRESS NOTES
Rivas Gibbs MD        Patient Information  Date:8/31/2022  Name : Sebastian Sears 55 y.o.     YOB: 1965         Referred by: Cari Mccoy MD         Chief Complaint   Patient presents with    Diabetes       History of Present Illness: Sebastian Sears is a 55 y.o. male here for fu  of  Type 2 Diabetes Mellitus. Type 2 Diabetes was diagnosed in 2016 . End organ effects of diabetes: none. Cardiovascular risk factors: dyslipidemia, diabetes mellitus, male gender   He is taking the medications consistently  Lost weight, attributes to COVID      Generalized itching: Seen allergist/dermatology Dr. Munir Pugh, on antihistamines    Hypoglycemia: no    Taking metformin consistently        Wt Readings from Last 3 Encounters:   08/31/22 170 lb (77.1 kg)   04/27/22 176 lb (79.8 kg)   01/28/21 173 lb (78.5 kg)       BP Readings from Last 3 Encounters:   08/31/22 101/62   04/27/22 102/64   01/28/21 106/70           Past Medical History:   Diagnosis Date    Allergic rhinitis 2/21/2014    Asthma     Benign essential HTN 2/24/2019    Diabetes ([facility] [address]     History of seasonal allergies     Newly converted positive PPD test 9/26/2012    KAYCE (obstructive sleep apnea)     Uses nightly CPAP machine. Follows with Dr. Leda Camacho [facility]), annual visits     Current Outpatient Medications   Medication Sig    albuterol sulfate (PROVENTIL;VENTOLIN) 2.5 mg/0.5 mL nebu nebulizer solution 0.5 mL by Nebulization route every four (4) hours as needed for Wheezing or Respiratory Distress.     albuterol (PROVENTIL HFA, VENTOLIN HFA, PROAIR HFA) 90 mcg/actuation inhaler Take 2 Puffs by inhalation every four (4) hours as needed for Wheezing.    mometasone-formoterol (Dulera) 200-5 mcg/actuation HFA inhaler INHALE 2 PUFFS BY MOUTH TWICE DAILY (Patient taking differently: INHALE 2 PUFFS BY MOUTH TWICE DAILY PRN)    metFORMIN ER (GLUCOPHAGE XR) 500 mg tablet TAKE 1 TABLET BY MOUTH EVERY DAY    lisinopriL (PRINIVIL, ZESTRIL) 10 mg tablet TAKE 1 TABLET BY MOUTH EVERY DAY    rosuvastatin (CRESTOR) 10 mg tablet TAKE 1 TABLET BY MOUTH EVERY DAY AT NIGHT    fluticasone propionate (FLONASE) 50 mcg/actuation nasal spray SHAKE LIQUID AND USE 2 SPRAYS IN EACH NOSTRIL EVERY DAY    montelukast (Singulair) 10 mg tablet Take 1 Tab by mouth daily. (Patient taking differently: Take 10 mg by mouth as needed.)    multivitamin (ONE A DAY) tablet Take 1 Tab by mouth daily. cpap machine kit by Does Not Apply route. No current facility-administered medications for this visit. Allergies   Allergen Reactions    Pcn [Penicillins] Other (comments)     Father told him as child he was allergic to PCN. He does not know if he has every been on a cephalosporin that he has tolerated         Review of Systems:    Per HPI    Physical Examination:   Blood pressure 101/62, pulse 88, temperature 98.1 °F (36.7 °C), temperature source Temporal, resp. rate 20, height 5' 4\" (1.626 m), weight 170 lb (77.1 kg), SpO2 98 %. Estimated body mass index is 29.18 kg/m² as calculated from the following:    Height as of this encounter: 5' 4\" (1.626 m). Weight as of this encounter: 170 lb (77.1 kg).   General: pleasant, no distress, good eye contact  HEENT: no exophthalmos, no periorbital edema, EOMI  Neck: No thyromegaly  CVS: S1-S2 regular  RS: Normal respiratory effort  Musculoskeletal: no tremors  Neurological: alert and oriented  Psychiatric: normal mood and affect  Skin: Normal color      Data Reviewed:     Diabetic foot exam: January 2019    Left:     Vibratory sensation normal    Filament test normal sensation with micro filament   Pulse DP: 1+    Deformities: None  Right:    Vibratory sensation normal   Filament test normal sensation with micro filament   Pulse DP: 1+   Deformities: None        Lab Results   Component Value Date/Time    Hemoglobin A1c 6.1 (H) 08/22/2022 08:33 AM    Hemoglobin A1c 6.3 (H) 04/18/2022 03:17 PM    Hemoglobin A1c 5.8 (H) 09/01/2021 08:30 AM    Hemoglobin A1c, External 7.4 09/23/2016 12:00 AM    Glucose 91 04/18/2022 03:17 PM    Glucose (POC) 124 (H) 12/31/2013 10:32 PM    Microalb/Creat ratio (ug/mg creat.) <3 01/20/2021 09:22 AM    LDL,Direct 120 (H) 08/22/2022 08:33 AM    LDL, calculated 131 (H) 04/18/2022 03:17 PM    LDL, calculated 100 (H) 01/07/2020 08:10 AM    Creatinine (POC) 1.3 12/31/2013 10:32 PM    Creatinine 1.17 04/18/2022 03:17 PM      Lab Results   Component Value Date/Time    Cholesterol, total 184 04/18/2022 03:17 PM    HDL Cholesterol 35 (L) 04/18/2022 03:17 PM    LDL,Direct 120 (H) 08/22/2022 08:33 AM    LDL, calculated 131 (H) 04/18/2022 03:17 PM    LDL, calculated 100 (H) 01/07/2020 08:10 AM    Triglyceride 100 04/18/2022 03:17 PM       Lab Results   Component Value Date/Time    ALT (SGPT) 15 01/20/2021 09:22 AM    Alk. phosphatase 59 01/20/2021 09:22 AM    Bilirubin, direct 0.27 08/14/2017 09:08 AM    Bilirubin, total 1.0 01/20/2021 09:22 AM       Lab Results   Component Value Date/Time    GFR est AA 80 09/01/2021 08:30 AM    GFR est non-AA 70 09/01/2021 08:30 AM    Creatinine (POC) 1.3 12/31/2013 10:32 PM    Creatinine 1.17 04/18/2022 03:17 PM    BUN 13 04/18/2022 03:17 PM    BUN (POC) 13 12/31/2013 10:32 PM    Sodium (POC) 140 12/31/2013 10:32 PM    Sodium 138 04/18/2022 03:17 PM    Potassium 4.8 04/18/2022 03:17 PM    Potassium (POC) 3.2 (L) 12/31/2013 10:32 PM    Chloride (POC) 105 12/31/2013 10:32 PM    Chloride 103 04/18/2022 03:17 PM    CO2 21 04/18/2022 03:17 PM        Assessment/Plan:     1. Type 2 diabetes mellitus with hyperglycemia, without long-term current use of insulin ([facility]    2. Benign essential HTN        1.  Type 2 Diabetes Mellitus   Lab Results   Component Value Date/Time    Hemoglobin A1c 6.1 (H) 08/22/2022 08:33 AM    Hemoglobin A1c (POC) 5.9 08/21/2017 12:33 PM    Hemoglobin A1c, External 7.4 09/23/2016 12:00 AM     Controlled  Diabetic issues reviewed : glycemic goals ,  low carbohydrate diet, weight control ,    2. Hyperlipidemia : Statin       4. Obesity:Body mass index is 29.18 kg/m². Discussed about the importance of exercise and carbohydrate portion control. 5.  Itching rash: Followed by allergist    #6. BPH    Increase activity    There are no Patient Instructions on file for this visit. Thank you for allowing me to participate in the care of this patient.     Teresa Quiroz MD      Patient verbalized understanding

## 2022-08-31 NOTE — LETTER
8/31/2022    Patient: Per Dickson   YOB: 1965   Date of Visit: 8/31/2022     Tay Quiñones, [address]  [address] [facility] [address]    Dear Tay Quiñones MD,      Thank you for referring Mr. Per Dickson to [facility] for evaluation. My notes for this consultation are attached. If you have questions, please do not hesitate to call me. I look forward to following your patient along with you.       Sincerely,    Leo Rojas MD

## 2022-08-31 NOTE — PROGRESS NOTES
Blaine Leticia is a 55 y.o. male here for   Chief Complaint   Patient presents with    Diabetes       1. Have you been to the ER, urgent care clinic since your last visit? Hospitalized since your last visit? -no    2. Have you seen or consulted any other health care providers outside of the [address] [place] [address] since your last visit?   Include any pap smears or colon screening.-no

## 2022-09-22 DIAGNOSIS — J45.40 MODERATE PERSISTENT ASTHMA WITHOUT COMPLICATION: ICD-10-CM

## 2022-09-22 RX ORDER — ALBUTEROL SULFATE 90 UG/1
AEROSOL, METERED RESPIRATORY (INHALATION)
Qty: 18 EACH | Refills: 0 | Status: SHIPPED | OUTPATIENT
Start: 2022-09-22 | End: 2022-10-18

## 2022-10-18 ENCOUNTER — VIRTUAL VISIT (OUTPATIENT)
Dept: FAMILY MEDICINE CLINIC | Age: 57
End: 2022-10-18
Payer: COMMERCIAL

## 2022-10-18 ENCOUNTER — NURSE TRIAGE (OUTPATIENT)
Dept: OTHER | Facility: CLINIC | Age: 57
End: 2022-10-18

## 2022-10-18 DIAGNOSIS — J06.9 UPPER RESPIRATORY TRACT INFECTION, UNSPECIFIED TYPE: ICD-10-CM

## 2022-10-18 DIAGNOSIS — J20.9 ACUTE BRONCHITIS, UNSPECIFIED ORGANISM: Primary | ICD-10-CM

## 2022-10-18 DIAGNOSIS — J45.901 EXACERBATION OF ASTHMA, UNSPECIFIED ASTHMA SEVERITY, UNSPECIFIED WHETHER PERSISTENT: ICD-10-CM

## 2022-10-18 DIAGNOSIS — J45.40 MODERATE PERSISTENT ASTHMA WITHOUT COMPLICATION: ICD-10-CM

## 2022-10-18 PROCEDURE — 99214 OFFICE O/P EST MOD 30 MIN: CPT | Performed by: FAMILY MEDICINE

## 2022-10-18 RX ORDER — ALBUTEROL SULFATE 90 UG/1
AEROSOL, METERED RESPIRATORY (INHALATION)
Qty: 18 EACH | Refills: 0
Start: 2022-10-18

## 2022-10-18 RX ORDER — DOXYCYCLINE 100 MG/1
100 TABLET ORAL 2 TIMES DAILY
Qty: 20 TABLET | Refills: 0 | Status: SHIPPED | OUTPATIENT
Start: 2022-10-18 | End: 2022-10-27

## 2022-10-18 RX ORDER — CYCLOSPORINE 0 G/ML
SOLUTION/ DROPS OPHTHALMIC; TOPICAL
COMMUNITY
Start: 2022-09-19

## 2022-10-18 RX ORDER — FLUTICASONE PROPIONATE 50 MCG
SPRAY, SUSPENSION (ML) NASAL
Qty: 3 EACH | Refills: 4 | Status: SHIPPED | OUTPATIENT
Start: 2022-10-18

## 2022-10-18 RX ORDER — GUAIFENESIN AND PSEUDOEPHEDRINE HCL 1200; 120 MG/1; MG/1
1 TABLET, EXTENDED RELEASE ORAL
Qty: 30 TABLET | Refills: 0 | Status: SHIPPED | OUTPATIENT
Start: 2022-10-18

## 2022-10-18 NOTE — TELEPHONE ENCOUNTER
[facility]    Received call from [facility] at [facility] with [employer] Complaint. Subjective: Caller states \"Cold/flu sx\"     Current Symptoms: Productive cough, white-green phlegm, chest congestion, SOB, back pain    Onset: 10 days ago; gradual    Associated Symptoms: NA    Pain Severity: 8/10; pain; constant    Temperature: denies fever    What has been tried: Robitussin     LMP: NA Pregnant: NA    Recommended disposition: See PCP within 24 Hours    Care advice provided, patient verbalizes understanding; denies any other questions or concerns; instructed to call back for any new or worsening symptoms. Patient/Caller agrees with recommended disposition; writer provided warm transfer to Sandra Amato at [facility] for appointment scheduling    Attention Provider: Thank you for allowing me to participate in the care of your patient. The patient was connected to triage in response to information provided to the [facility]. Please do not respond through this encounter as the response is not directed to a shared pool.     Reason for Disposition   SEVERE coughing spells (e.g., whooping sound after coughing, vomiting after coughing)    Protocols used: Cough - Acute Productive-ADULT-AH

## 2022-10-18 NOTE — PROGRESS NOTES
Danielle Granda is a 55 y.o. male who was seen by synchronous (real-time) audio-video technology on 10/18/2022. Assessment & Plan:   Diagnoses and all orders for this visit:    1. Acute bronchitis, unspecified organism  -     doxycycline (ADOXA) 100 mg tablet; Take 1 Tablet by mouth two (2) times a day for 10 days.  -     PSEUDOEPHEDRINE-guaiFENesin (Mucinex D Maximum Strength) Tb12 extended release tablet; Take 1 Tablet by mouth two (2) times daily as needed for Cough or PRN Reason (Other) (congestion). 2. Exacerbation of asthma, unspecified asthma severity, unspecified whether persistent    3. Upper respiratory tract infection, unspecified type  -     PSEUDOEPHEDRINE-guaiFENesin (Mucinex D Maximum Strength) Tb12 extended release tablet; Take 1 Tablet by mouth two (2) times daily as needed for Cough or PRN Reason (Other) (congestion). 4. Moderate persistent asthma without complication  -     albuterol (PROVENTIL HFA, VENTOLIN HFA, PROAIR HFA) 90 mcg/actuation inhaler; INHALE 2 PUFFS BY MOUTH EVERY 4 HOURS AS NEEDED FOR WHEEZE  -     fluticasone propionate (FLONASE) 50 mcg/actuation nasal spray; SHAKE LIQUID AND USE 2 SPRAYS IN EACH NOSTRIL EVERY DAY      Acute bronchitis due to URI exacerbating asthma  Doxycycline  Mucinex D prn  Schedule albuterol x 3 days, then prn  Refills per orders  Rest, push fluids    Follow-up and Dispositions    Return if not better in 3 days. Reviewed plan of care. Patient has provided input and agrees with goals. CPT Codes 03198-08215 for Established Patients may apply to this Telehealth Visit      Subjective:   Danielle Granda was seen for Cold Symptoms (X 10days.) and Cough (Productive clear and sometimes yellow/greenish color phlem. Patient states that he is really congested and wheezing. Patient has albuterol Rx but states that he does not use it.)      Patient presents with:  Cold Symptoms: X 10days.   Cough: Productive clear and sometimes yellow/greenish color phlem. Patient states that he is really congested and wheezing. Patient has albuterol Rx but states that he does not use it. He has asthma and is using his Dulera regularly. Review of Systems   Constitutional:  Negative for chills, fever and malaise/fatigue. HENT:  Positive for congestion. Negative for ear pain and sore throat. Mucous is green and white in color. There is no sinus pain. Eyes:  Negative for discharge. Respiratory:  Positive for cough, sputum production, shortness of breath and wheezing. Negative for hemoptysis. Neurological:  Negative for headaches. Objective:     Physical Exam  Constitutional:       General: He is not in acute distress. Appearance: Normal appearance. He is not ill-appearing. Pulmonary:      Effort: Pulmonary effort is normal.   Neurological:      Mental Status: He is alert and oriented to person, place, and time. Due to this being a TeleHealth evaluation, many elements of the physical examination are unable to be assessed. We discussed the expected course, resolution and complications of the diagnosis(es) in detail. Medication risks, benefits, costs, interactions, and alternatives were discussed as indicated. I advised him to contact the office if his condition worsens, changes or fails to improve as anticipated. He expressed understanding with the diagnosis(es) and plan. Pursuant to the emergency declaration under the [facility], [facility] waiver authority and the [employer] General Act, this Virtual  Visit was conducted, with patient's consent, to reduce the patient's risk of exposure to COVID-19 and provide continuity of care for an established patient. Services were provided through a video synchronous discussion virtually to substitute for in-person clinic visit.     Tiesha Brand MD

## 2022-10-18 NOTE — PROGRESS NOTES
Chief Complaint   Patient presents with    Cold Symptoms     X 10days. Cough     Productive clear and sometimes yellow/greenish color phlem. Patient states that he is really congested and wheezing. Patient has albuterol Rx but states that he does not use it. 1. Have you been to the ER, urgent care clinic since your last visit? Hospitalized since your last visit? No    2. Have you seen or consulted any other health care providers outside of the [address] [place] [address] since your last visit? Include any pap smears or colon screening.  No

## 2022-10-26 ENCOUNTER — TELEPHONE (OUTPATIENT)
Dept: FAMILY MEDICINE CLINIC | Age: 57
End: 2022-10-26

## 2022-10-26 ENCOUNTER — HOSPITAL ENCOUNTER (OUTPATIENT)
Dept: GENERAL RADIOLOGY | Age: 57
Discharge: HOME | End: 2022-10-26
Payer: COMMERCIAL

## 2022-10-26 DIAGNOSIS — R05.1 ACUTE COUGH: ICD-10-CM

## 2022-10-26 DIAGNOSIS — R05.1 ACUTE COUGH: Primary | ICD-10-CM

## 2022-10-26 PROCEDURE — 71046 X-RAY EXAM CHEST 2 VIEWS: CPT

## 2022-10-27 ENCOUNTER — VIRTUAL VISIT (OUTPATIENT)
Dept: FAMILY MEDICINE CLINIC | Age: 57
End: 2022-10-27
Payer: COMMERCIAL

## 2022-10-27 DIAGNOSIS — J20.9 ACUTE BRONCHITIS, UNSPECIFIED ORGANISM: Primary | ICD-10-CM

## 2022-10-27 DIAGNOSIS — J01.90 ACUTE NON-RECURRENT SINUSITIS, UNSPECIFIED LOCATION: ICD-10-CM

## 2022-10-27 DIAGNOSIS — J45.40 MODERATE PERSISTENT ASTHMA WITHOUT COMPLICATION: ICD-10-CM

## 2022-10-27 DIAGNOSIS — J45.901 EXACERBATION OF PERSISTENT ASTHMA, UNSPECIFIED ASTHMA SEVERITY: ICD-10-CM

## 2022-10-27 PROCEDURE — 99214 OFFICE O/P EST MOD 30 MIN: CPT | Performed by: FAMILY MEDICINE

## 2022-10-27 RX ORDER — PREDNISONE 20 MG/1
20 TABLET ORAL 3 TIMES DAILY
Qty: 15 TABLET | Refills: 0 | Status: SHIPPED | OUTPATIENT
Start: 2022-10-27 | End: 2022-11-01

## 2022-10-27 RX ORDER — LEVOFLOXACIN 500 MG/1
500 TABLET, FILM COATED ORAL DAILY
Qty: 5 TABLET | Refills: 0 | Status: SHIPPED | OUTPATIENT
Start: 2022-10-27 | End: 2022-11-01

## 2022-10-27 RX ORDER — BENZONATATE 200 MG/1
200 CAPSULE ORAL
Qty: 30 CAPSULE | Refills: 0 | Status: SHIPPED | OUTPATIENT
Start: 2022-10-27

## 2022-10-27 RX ORDER — MONTELUKAST SODIUM 10 MG/1
10 TABLET ORAL DAILY
Qty: 90 TABLET | Refills: 0 | Status: SHIPPED | OUTPATIENT
Start: 2022-10-27

## 2022-10-27 NOTE — PROGRESS NOTES
Shante Suresh is a 55 y.o. male who was seen by synchronous (real-time) audio-video technology on 10/27/2022. Assessment & Plan:   Diagnoses and all orders for this visit:    1. Acute bronchitis, unspecified organism  -     levoFLOXacin (Levaquin) 500 mg tablet; Take 1 Tablet by mouth daily for 5 days. -     benzonatate (TESSALON) 200 mg capsule; Take 1 Capsule by mouth three (3) times daily as needed for Cough. 2. Acute non-recurrent sinusitis, unspecified location  -     levoFLOXacin (Levaquin) 500 mg tablet; Take 1 Tablet by mouth daily for 5 days. 3. Exacerbation of persistent asthma, unspecified asthma severity  -     predniSONE (DELTASONE) 20 mg tablet; Take 1 Tablet by mouth three (3) times daily for 5 days. 4. Moderate persistent asthma without complication  -     montelukast (Singulair) 10 mg tablet; Take 1 Tablet by mouth daily. Stopped doxycycline so no better  Levaquin  Prednisone  Tessalon prn  Rest, push fluids  Refills per orders    Follow-up and Dispositions    Return if not better in one week. Reviewed plan of care. Patient has provided input and agrees with goals. CPT Codes 33048-98791 for Established Patients may apply to this Telehealth Visit      Subjective:   Shante Suresh was seen for URI (Follow up. Patient states that his symptoms have gotten worse since he saw provider last week. Patient did not complete doxycyline because he states that it stopped his urine flow. )      Patient presents with:  URI: Follow up. Patient states that his symptoms have gotten worse since he saw provider last week. Patient did not complete doxycyline because he states that it stopped his urine flow. I saw him for acute bronchitis, due to a URI, exacerbating his asthma. His chest X-ray was negative yesterday. His albuterol is not working. Review of Systems   Constitutional:  Positive for malaise/fatigue. Negative for chills and fever.    HENT:  Positive for congestion and sinus pain. Negative for ear pain and sore throat. His mucus is green to white in color   Respiratory:  Positive for cough, sputum production, shortness of breath and wheezing. Negative for hemoptysis. His sputum is green to mouth   Neurological:  Negative for headaches. Objective:     Physical Exam  Constitutional:       General: He is not in acute distress. Appearance: Normal appearance. Pulmonary:      Effort: Pulmonary effort is normal.   Neurological:      Mental Status: He is alert and oriented to person, place, and time. Due to this being a TeleHealth evaluation, many elements of the physical examination are unable to be assessed. We discussed the expected course, resolution and complications of the diagnosis(es) in detail. Medication risks, benefits, costs, interactions, and alternatives were discussed as indicated. I advised him to contact the office if his condition worsens, changes or fails to improve as anticipated. He expressed understanding with the diagnosis(es) and plan. Pursuant to the emergency declaration under the [facility], [facility] waiver authority and the [employer] General Act, this Virtual  Visit was conducted, with patient's consent, to reduce the patient's risk of exposure to COVID-19 and provide continuity of care for an established patient. Services were provided through a video synchronous discussion virtually to substitute for in-person clinic visit.     Alycia Marie MD

## 2022-10-27 NOTE — PROGRESS NOTES
Chief Complaint   Patient presents with    URI     Follow up. Patient states that his symptoms have gotten worse since he saw provider last week. Patient did not complete doxycyline because he states that it stopped his urine flow. 1. Have you been to the ER, urgent care clinic since your last visit? Hospitalized since your last visit? No    2. Have you seen or consulted any other health care providers outside of the [address] [place] [address] since your last visit? Include any pap smears or colon screening.  No

## 2022-11-30 DIAGNOSIS — J45.40 MODERATE PERSISTENT ASTHMA WITHOUT COMPLICATION: ICD-10-CM

## 2022-12-01 RX ORDER — ALBUTEROL SULFATE 90 UG/1
AEROSOL, METERED RESPIRATORY (INHALATION)
Qty: 18 EACH | Refills: 0 | Status: SHIPPED | OUTPATIENT
Start: 2022-12-01

## 2023-02-17 NOTE — TELEPHONE ENCOUNTER
Pt called to advise Dr. Dorothy Carver he's unable to take the atarax during the day due to extreme drowsiness and dizziness causing him to have to miss work, stating he was unable to drive after he took it. Pt asking if he can resume claritin during the day and take Atarax at night or if Dr. Dorothy Carver can recommend something non-drowsy. Please call to advise at [phone].  Ldm Enoxaparin/Lovenox is used to treat and prevent blood clots. Use a different body area each time you give yourself a shot. Keep track of where you give each shot to make sure you rotate body areas. Use a new needle and syringe each time you inject your medicine. Never skip a dose of Enoxaparin/Lovenox. You must use this medicine on a fixed schedule.  NEVER TAKE A DOUBLE DOSE. Call your doctor or pharmacist if you miss a dose. Take Enoxaparin/Lovenox at the same time each morning and/or evening.

## 2023-03-18 DIAGNOSIS — J45.40 MODERATE PERSISTENT ASTHMA WITHOUT COMPLICATION: ICD-10-CM

## 2023-03-19 RX ORDER — ALBUTEROL SULFATE 90 UG/1
AEROSOL, METERED RESPIRATORY (INHALATION)
Qty: 18 EACH | Refills: 0 | Status: SHIPPED | OUTPATIENT
Start: 2023-03-19

## 2023-04-19 LAB
ALBUMIN/CREAT UR: <3 MG/G CREAT (ref 0–29)
BUN SERPL-MCNC: 17 MG/DL (ref 6–24)
BUN/CREAT SERPL: 16 (ref 9–20)
CALCIUM SERPL-MCNC: 9 MG/DL (ref 8.7–10.2)
CHLORIDE SERPL-SCNC: 108 MMOL/L (ref 96–106)
CO2 SERPL-SCNC: 15 MMOL/L (ref 20–29)
CREAT SERPL-MCNC: 1.06 MG/DL (ref 0.76–1.27)
CREAT UR-MCNC: 92.7 MG/DL
EGFRCR SERPLBLD CKD-EPI 2021: 82 ML/MIN/1.73
EST. AVERAGE GLUCOSE BLD GHB EST-MCNC: 128 MG/DL
GLUCOSE SERPL-MCNC: 91 MG/DL (ref 70–99)
HBA1C MFR BLD: 6.1 % (ref 4.8–5.6)
LDLC SERPL DIRECT ASSAY-MCNC: 171 MG/DL (ref 0–99)
MICROALBUMIN UR-MCNC: <3 UG/ML
POTASSIUM SERPL-SCNC: 4.2 MMOL/L (ref 3.5–5.2)
SODIUM SERPL-SCNC: 139 MMOL/L (ref 134–144)

## 2023-04-24 NOTE — LETTER
4/27/2022    Patient: Chayo Nelson   YOB: 1965   Date of Visit: 4/27/2022     Marybeth Oconnor, [address] [address] [address]  [facility] [address]    Dear Marybeth Oconnor MD,      Thank you for referring Mr. Chayo Nelson to [facility] for evaluation. My notes for this consultation are attached. If you have questions, please do not hesitate to call me. I look forward to following your patient along with you.       Sincerely,    Jocelyn Banda MD [employer] Pregnancy And Lactation Text: This medication is Pregnancy Category B and is considered safe. There is some data to suggest avoiding during the third trimester. It is unknown if this medication is excreted in breast milk.

## 2023-04-27 ENCOUNTER — OFFICE VISIT (OUTPATIENT)
Dept: ENDOCRINOLOGY | Age: 58
End: 2023-04-27
Payer: COMMERCIAL

## 2023-04-27 VITALS
HEART RATE: 105 BPM | SYSTOLIC BLOOD PRESSURE: 107 MMHG | TEMPERATURE: 99 F | OXYGEN SATURATION: 97 % | RESPIRATION RATE: 20 BRPM | WEIGHT: 176 LBS | DIASTOLIC BLOOD PRESSURE: 67 MMHG | BODY MASS INDEX: 30.05 KG/M2 | HEIGHT: 64 IN

## 2023-04-27 DIAGNOSIS — I10 BENIGN ESSENTIAL HTN: ICD-10-CM

## 2023-04-27 DIAGNOSIS — E78.2 MIXED HYPERLIPIDEMIA: ICD-10-CM

## 2023-04-27 DIAGNOSIS — E11.65 TYPE 2 DIABETES MELLITUS WITH HYPERGLYCEMIA, WITHOUT LONG-TERM CURRENT USE OF INSULIN (HCC): Primary | ICD-10-CM

## 2023-04-27 PROCEDURE — 3044F HG A1C LEVEL LT 7.0%: CPT | Performed by: INTERNAL MEDICINE

## 2023-04-27 PROCEDURE — 99214 OFFICE O/P EST MOD 30 MIN: CPT | Performed by: INTERNAL MEDICINE

## 2023-04-27 PROCEDURE — 3078F DIAST BP <80 MM HG: CPT | Performed by: INTERNAL MEDICINE

## 2023-04-27 PROCEDURE — 3074F SYST BP LT 130 MM HG: CPT | Performed by: INTERNAL MEDICINE

## 2023-04-27 NOTE — PROGRESS NOTES
Angie Huizar MD        Patient Information  Date:4/27/2023  Name : Jane Logan 65 y.o.     YOB: 1965         Referred by: Eli Oneal MD         Chief Complaint   Patient presents with    Diabetes       History of Present Illness: Jane Logan is a 65 y.o. male here for fu  of  Type 2 Diabetes Mellitus. Type 2 Diabetes was diagnosed in 2016 . End organ effects of diabetes: none. Cardiovascular risk factors: dyslipidemia, diabetes mellitus, male gender   Not taking cholesterol medicine consistently, during fasting he had increased increased fat intake   Metformin  Had labs recently    Generalized itching: Seen allergist/dermatology Dr. Geovani Fatima, on antihistamines    Hypoglycemia: no          Wt Readings from Last 3 Encounters:   08/31/22 170 lb (77.1 kg)   04/27/22 176 lb (79.8 kg)   01/28/21 173 lb (78.5 kg)       BP Readings from Last 3 Encounters:   08/31/22 101/62   04/27/22 102/64   01/28/21 106/70           Past Medical History:   Diagnosis Date    Allergic rhinitis 2/21/2014    Asthma     Benign essential HTN 2/24/2019    Diabetes ([facility] [address]     History of seasonal allergies     Newly converted positive PPD test 9/26/2012    KAYCE (obstructive sleep apnea)     Uses nightly CPAP machine. Follows with Dr. Ramona Charles [facility]), annual visits     Current Outpatient Medications   Medication Sig    albuterol (PROVENTIL HFA, VENTOLIN HFA, PROAIR HFA) 90 mcg/actuation inhaler TAKE 2 PUFFS BY MOUTH EVERY 4 HOURS AS NEEDED FOR WHEEZE    montelukast (Singulair) 10 mg tablet Take 1 Tablet by mouth daily. benzonatate (TESSALON) 200 mg capsule Take 1 Capsule by mouth three (3) times daily as needed for Cough.     Cequa 0.09 % dpet INSTILL 1 DROP INTO BOTH EYES TWICE A DAY    PSEUDOEPHEDRINE-guaiFENesin (Mucinex D Maximum Strength) Tb12 extended release tablet Take 1 Tablet by mouth two (2) times daily as needed for Cough or PRN Reason (Other) (congestion). fluticasone propionate (FLONASE) 50 mcg/actuation nasal spray SHAKE LIQUID AND USE 2 SPRAYS IN EACH NOSTRIL EVERY DAY    mometasone-formoterol (Dulera) 200-5 mcg/actuation HFA inhaler INHALE 2 PUFFS INTO THE LUNGS TWICE A DAY    metFORMIN ER (GLUCOPHAGE XR) 500 mg tablet TAKE 1 TABLET BY MOUTH EVERY DAY    lisinopriL (PRINIVIL, ZESTRIL) 10 mg tablet TAKE 1 TABLET BY MOUTH EVERY DAY    rosuvastatin (CRESTOR) 10 mg tablet TAKE 1 TABLET BY MOUTH EVERY DAY AT NIGHT    multivitamin (ONE A DAY) tablet Take 1 Tab by mouth daily. No current facility-administered medications for this visit. Allergies   Allergen Reactions    Pcn [Penicillins] Other (comments)     Father told him as child he was allergic to PCN. He does not know if he has every been on a cephalosporin that he has tolerated    Penicillin Unknown (comments)         Review of Systems:    Per HPI    Physical Examination:   Height 5' 4\" (1.626 m). Estimated body mass index is 29.18 kg/m² as calculated from the following:    Height as of this encounter: 5' 4\" (1.626 m). Weight as of 8/31/22: 170 lb (77.1 kg).   General: pleasant, no distress, good eye contact  HEENT: no exophthalmos, no periorbital edema, EOMI  Neck: No thyromegaly  CVS: S1-S2 regular  RS: Normal respiratory effort  Musculoskeletal: no tremors  Neurological: alert and oriented  Psychiatric: normal mood and affect  Skin: Normal color      Data Reviewed:     Diabetic foot exam: January 2019    Left:     Vibratory sensation normal    Filament test normal sensation with micro filament   Pulse DP: 1+    Deformities: None  Right:    Vibratory sensation normal   Filament test normal sensation with micro filament   Pulse DP: 1+   Deformities: None        Lab Results   Component Value Date/Time    Hemoglobin A1c 6.1 (H) 04/18/2023 12:47 PM    Hemoglobin A1c 6.1 (H) 08/22/2022 08:33 AM    Hemoglobin A1c 6.3 (H) 04/18/2022 03:17 PM    Hemoglobin A1c, External 7.4 09/23/2016 12:00 AM    Glucose 91 04/18/2023 12:47 PM    Glucose (POC) 124 (H) 12/31/2013 10:32 PM    Microalb/Creat ratio (ug/mg creat.) <3 04/18/2023 12:47 PM    LDL,Direct 171 (H) 04/18/2023 12:47 PM    LDL, calculated 131 (H) 04/18/2022 03:17 PM    LDL, calculated 100 (H) 01/07/2020 08:10 AM    Creatinine (POC) 1.3 12/31/2013 10:32 PM    Creatinine 1.06 04/18/2023 12:47 PM      Lab Results   Component Value Date/Time    Cholesterol, total 184 04/18/2022 03:17 PM    HDL Cholesterol 35 (L) 04/18/2022 03:17 PM    LDL,Direct 171 (H) 04/18/2023 12:47 PM    LDL, calculated 131 (H) 04/18/2022 03:17 PM    LDL, calculated 100 (H) 01/07/2020 08:10 AM    Triglyceride 100 04/18/2022 03:17 PM       Lab Results   Component Value Date/Time    ALT (SGPT) 15 01/20/2021 09:22 AM    Alk. phosphatase 59 01/20/2021 09:22 AM    Bilirubin, direct 0.27 08/14/2017 09:08 AM    Bilirubin, total 1.0 01/20/2021 09:22 AM       Lab Results   Component Value Date/Time    GFR est AA 80 09/01/2021 08:30 AM    GFR est non-AA 70 09/01/2021 08:30 AM    Creatinine (POC) 1.3 12/31/2013 10:32 PM    Creatinine 1.06 04/18/2023 12:47 PM    BUN 17 04/18/2023 12:47 PM    BUN (POC) 13 12/31/2013 10:32 PM    Sodium (POC) 140 12/31/2013 10:32 PM    Sodium 139 04/18/2023 12:47 PM    Potassium 4.2 04/18/2023 12:47 PM    Potassium (POC) 3.2 (L) 12/31/2013 10:32 PM    Chloride (POC) 105 12/31/2013 10:32 PM    Chloride 108 (H) 04/18/2023 12:47 PM    CO2 15 (L) 04/18/2023 12:47 PM        Assessment/Plan:     1. Type 2 diabetes mellitus with hyperglycemia, without long-term current use of insulin ([facility] [address]    2. Mixed hyperlipidemia    3. Benign essential HTN        1. Type 2 Diabetes Mellitus   Lab Results   Component Value Date/Time    Hemoglobin A1c 6.1 (H) 04/18/2023 12:47 PM    Hemoglobin A1c (POC) 5.9 08/21/2017 12:33 PM    Hemoglobin A1c, External 7.4 09/23/2016 12:00 AM     Controlled  On metformin    2. Hyperlipidemia : Statin: Resume      4. Obesity:Body mass index is 29.18 kg/m². Discussed about the importance of exercise and carbohydrate portion control. 5.  Itching rash: Followed by allergist    #6. BPH    Increase activity    There are no Patient Instructions on file for this visit. Thank you for allowing me to participate in the care of this patient.     Gonsalo Menjivar MD      Patient verbalized understanding

## 2023-04-27 NOTE — PROGRESS NOTES
Eva Hummel is a 65 y.o. male here for   Chief Complaint   Patient presents with    Diabetes       1. Have you been to the ER, urgent care clinic since your last visit? Hospitalized since your last visit? -    2. Have you seen or consulted any other health care providers outside of the [address] [place] [address] since your last visit?   Include any pap smears or colon screening.-

## 2023-05-03 ENCOUNTER — TELEPHONE (OUTPATIENT)
Dept: ENDOCRINOLOGY | Age: 58
End: 2023-05-03

## 2023-05-03 DIAGNOSIS — E11.9 DIABETES MELLITUS WITHOUT COMPLICATION (HCC): ICD-10-CM

## 2023-05-03 DIAGNOSIS — I10 BENIGN ESSENTIAL HTN: ICD-10-CM

## 2023-05-03 RX ORDER — LISINOPRIL 10 MG/1
10 TABLET ORAL DAILY
Qty: 90 TABLET | Refills: 3 | Status: CANCELLED | OUTPATIENT
Start: 2023-05-03

## 2023-05-04 DIAGNOSIS — E11.9 DIABETES MELLITUS WITHOUT COMPLICATION (HCC): ICD-10-CM

## 2023-05-04 DIAGNOSIS — I10 BENIGN ESSENTIAL HTN: ICD-10-CM

## 2023-05-04 RX ORDER — LISINOPRIL 10 MG/1
10 TABLET ORAL DAILY
Qty: 90 TABLET | Refills: 3 | Status: SHIPPED | OUTPATIENT
Start: 2023-05-04

## 2023-05-16 RX ORDER — ALBUTEROL SULFATE 90 UG/1
AEROSOL, METERED RESPIRATORY (INHALATION)
COMMUNITY
Start: 2023-03-19

## 2023-05-16 RX ORDER — ROSUVASTATIN CALCIUM 10 MG/1
1 TABLET, COATED ORAL NIGHTLY
COMMUNITY
Start: 2022-04-17

## 2023-05-16 RX ORDER — FLUTICASONE PROPIONATE 50 MCG
SPRAY, SUSPENSION (ML) NASAL
COMMUNITY
Start: 2022-10-18

## 2023-05-16 RX ORDER — METFORMIN HYDROCHLORIDE 500 MG/1
1 TABLET, EXTENDED RELEASE ORAL DAILY
COMMUNITY
Start: 2022-04-18 | End: 2023-06-19

## 2023-05-16 RX ORDER — GUAIFENESIN AND PSEUDOEPHEDRINE HCL 1200; 120 MG/1; MG/1
1 TABLET, EXTENDED RELEASE ORAL 2 TIMES DAILY PRN
COMMUNITY
Start: 2022-10-18

## 2023-05-16 RX ORDER — LISINOPRIL 10 MG/1
10 TABLET ORAL DAILY
COMMUNITY
Start: 2023-05-04

## 2023-05-16 RX ORDER — BENZONATATE 200 MG/1
200 CAPSULE ORAL 3 TIMES DAILY PRN
COMMUNITY
Start: 2022-10-27

## 2023-05-16 RX ORDER — MONTELUKAST SODIUM 10 MG/1
10 TABLET ORAL DAILY
COMMUNITY
Start: 2022-10-27

## 2023-05-16 RX ORDER — CYCLOSPORINE 0 G/ML
SOLUTION/ DROPS OPHTHALMIC; TOPICAL
COMMUNITY
Start: 2022-09-19

## 2023-06-19 DIAGNOSIS — E11.9 TYPE 2 DIABETES MELLITUS WITHOUT COMPLICATIONS (HCC): ICD-10-CM

## 2023-06-19 RX ORDER — METFORMIN HYDROCHLORIDE 500 MG/1
TABLET, EXTENDED RELEASE ORAL
Qty: 90 TABLET | Refills: 3 | Status: SHIPPED | OUTPATIENT
Start: 2023-06-19

## 2023-09-07 LAB
ALBUMIN/CREAT UR: 15 MG/G CREAT (ref 0–29)
BUN SERPL-MCNC: 12 MG/DL (ref 6–24)
BUN/CREAT SERPL: 9 (ref 9–20)
CALCIUM SERPL-MCNC: 9.2 MG/DL (ref 8.7–10.2)
CHLORIDE SERPL-SCNC: 102 MMOL/L (ref 96–106)
CO2 SERPL-SCNC: 21 MMOL/L (ref 20–29)
CREAT SERPL-MCNC: 1.29 MG/DL (ref 0.76–1.27)
CREAT UR-MCNC: 65.2 MG/DL
EGFRCR SERPLBLD CKD-EPI 2021: 65 ML/MIN/1.73
EST. AVERAGE GLUCOSE BLD GHB EST-MCNC: 134 MG/DL
GLUCOSE SERPL-MCNC: 86 MG/DL (ref 70–99)
HBA1C MFR BLD: 6.3 % (ref 4.8–5.6)
LDLC SERPL DIRECT ASSAY-MCNC: 94 MG/DL (ref 0–99)
MICROALBUMIN UR-MCNC: 9.8 UG/ML
POTASSIUM SERPL-SCNC: 4.9 MMOL/L (ref 3.5–5.2)
SODIUM SERPL-SCNC: 136 MMOL/L (ref 134–144)

## 2023-09-13 ENCOUNTER — OFFICE VISIT (OUTPATIENT)
Age: 58
End: 2023-09-13
Payer: COMMERCIAL

## 2023-09-13 VITALS
DIASTOLIC BLOOD PRESSURE: 70 MMHG | WEIGHT: 183.3 LBS | TEMPERATURE: 98.9 F | RESPIRATION RATE: 18 BRPM | SYSTOLIC BLOOD PRESSURE: 108 MMHG | HEART RATE: 74 BPM | BODY MASS INDEX: 31.29 KG/M2 | OXYGEN SATURATION: 97 % | HEIGHT: 64 IN

## 2023-09-13 DIAGNOSIS — I10 ESSENTIAL HYPERTENSION: ICD-10-CM

## 2023-09-13 DIAGNOSIS — E11.65 TYPE 2 DIABETES MELLITUS WITH HYPERGLYCEMIA, UNSPECIFIED WHETHER LONG TERM INSULIN USE (HCC): Primary | ICD-10-CM

## 2023-09-13 DIAGNOSIS — E78.2 MIXED HYPERLIPIDEMIA: ICD-10-CM

## 2023-09-13 PROCEDURE — 3044F HG A1C LEVEL LT 7.0%: CPT | Performed by: INTERNAL MEDICINE

## 2023-09-13 PROCEDURE — 3074F SYST BP LT 130 MM HG: CPT | Performed by: INTERNAL MEDICINE

## 2023-09-13 PROCEDURE — 99214 OFFICE O/P EST MOD 30 MIN: CPT | Performed by: INTERNAL MEDICINE

## 2023-09-13 PROCEDURE — 3078F DIAST BP <80 MM HG: CPT | Performed by: INTERNAL MEDICINE

## 2023-09-13 NOTE — PROGRESS NOTES
Manny Woodson is a 65 y.o. male here for   Chief Complaint   Patient presents with    Diabetes       1. Have you been to the ER, urgent care clinic since your last visit? Hospitalized since your last visit? - no    2. Have you seen or consulted any other health care providers outside of the [address] [place] [address] Avenue since your last visit?   Include any pap smears or colon screening.- Cardiology, Vascular

## 2023-09-13 NOTE — PROGRESS NOTES
Benito De La Rosa MD            Patient Information   Date:4/27/2023   Name : Paula Singh 65 y.o.       YOB: 1965           Referred by: Erasmo Caruso MD                    History of Present Illness: Paula Singh is a 65 y.o. male here  for fu  of  Type 2 Diabetes Mellitus . Type 2 Diabetes was diagnosed in 2016 . End organ effects of diabetes:  none.      Cardiovascular risk factors: dyslipidemia, diabetes mellitus, male gender   Taking statin, exercising   Metformin   Had labs recently      Generalized itching: Seen allergist/dermatology Dr. Eula Essex, on antihistamines      Hypoglycemia: no                     Physical Examination:      General: pleasant, no distress, good eye contact    HEENT: no exophthalmos, no periorbital edema, EOMI    Neck: No thyromegaly    CVS: S1-S2 regular    RS: Normal respiratory effort    Musculoskeletal: no tremors    Neurological: alert and oriented    Psychiatric: normal mood and affect    Skin: Normal color         Data Reviewed:       Diabetic foot exam: January 2019     Left:      Vibratory sensation normal     Filament test normal sensation with micro filament    Pulse DP: 1+     Deformities: None   Right:     Vibratory sensation normal    Filament test normal sensation with micro filament    Pulse DP: 1+    Deformities: None                 Lab Results   Component Value Date    GFRAA 80 09/01/2021        Lab Results   Component Value Date    LABA1C 6.3 (H) 09/06/2023    LABA1C 6.1 (H) 04/18/2023    LABA1C 6.1 (H) 08/22/2022         Lab Results   Component Value Date    LDLCALC 131 (H) 04/18/2022    TRIG 100 04/18/2022    HDL 35 (L) 04/18/2022     09/06/2023    K 4.9 09/06/2023     09/06/2023    CO2 21 09/06/2023    BUN 12 09/06/2023    CREATININE 1.29 (H) 09/06/2023    GFRAA 80 09/01/2021    GLUCOSE 86 09/06/2023    CALCIUM 9.2 09/06/2023    MALBCR 15 09/06/2023

## 2023-10-29 DIAGNOSIS — J45.40 MODERATE PERSISTENT ASTHMA, UNCOMPLICATED: ICD-10-CM

## 2023-10-30 RX ORDER — ALBUTEROL SULFATE 90 UG/1
AEROSOL, METERED RESPIRATORY (INHALATION)
Qty: 3 EACH | Refills: 0 | Status: SHIPPED | OUTPATIENT
Start: 2023-10-30

## 2023-10-30 RX ORDER — FLUTICASONE PROPIONATE 50 MCG
SPRAY, SUSPENSION (ML) NASAL
Qty: 3 EACH | Refills: 1 | Status: SHIPPED | OUTPATIENT
Start: 2023-10-30

## 2023-12-29 DIAGNOSIS — J45.40 MODERATE PERSISTENT ASTHMA, UNCOMPLICATED: ICD-10-CM

## 2024-01-03 ENCOUNTER — OFFICE VISIT (OUTPATIENT)
Age: 59
End: 2024-01-03
Payer: COMMERCIAL

## 2024-01-03 VITALS
DIASTOLIC BLOOD PRESSURE: 87 MMHG | WEIGHT: 178.9 LBS | OXYGEN SATURATION: 98 % | HEIGHT: 64 IN | RESPIRATION RATE: 18 BRPM | SYSTOLIC BLOOD PRESSURE: 129 MMHG | BODY MASS INDEX: 30.54 KG/M2 | TEMPERATURE: 98.8 F | HEART RATE: 81 BPM

## 2024-01-03 DIAGNOSIS — E11.65 TYPE 2 DIABETES MELLITUS WITH HYPERGLYCEMIA, WITHOUT LONG-TERM CURRENT USE OF INSULIN (HCC): Primary | ICD-10-CM

## 2024-01-03 DIAGNOSIS — I10 BENIGN ESSENTIAL HTN: ICD-10-CM

## 2024-01-03 DIAGNOSIS — E78.00 HYPERCHOLESTEROLEMIA: ICD-10-CM

## 2024-01-03 PROCEDURE — 3079F DIAST BP 80-89 MM HG: CPT | Performed by: INTERNAL MEDICINE

## 2024-01-03 PROCEDURE — 3074F SYST BP LT 130 MM HG: CPT | Performed by: INTERNAL MEDICINE

## 2024-01-03 PROCEDURE — 99214 OFFICE O/P EST MOD 30 MIN: CPT | Performed by: INTERNAL MEDICINE

## 2024-01-03 RX ORDER — MONTELUKAST SODIUM 10 MG/1
10 TABLET ORAL DAILY
Qty: 30 TABLET | Refills: 0 | Status: SHIPPED | OUTPATIENT
Start: 2024-01-03 | End: 2024-01-03

## 2024-01-03 RX ORDER — MOMETASONE FUROATE AND FORMOTEROL FUMARATE DIHYDRATE 200; 5 UG/1; UG/1
2 AEROSOL RESPIRATORY (INHALATION) 2 TIMES DAILY
Qty: 1 EACH | Refills: 0 | Status: SHIPPED | OUTPATIENT
Start: 2024-01-03

## 2024-01-03 NOTE — PROGRESS NOTES
Jolly Almonte is a 58 y.o. male here for   Chief Complaint   Patient presents with    Diabetes       1. Have you been to the ER, urgent care clinic since your last visit?  Hospitalized since your last visit? - [facility]- COVID     2. Have you seen or consulted any other health care providers outside of the [facility] since your last visit?  Include any pap smears or colon screening.- No

## 2024-01-03 NOTE — PROGRESS NOTES
[facility]OCRINOLOGY                 Sravanthi Cooper MD            Patient Information   Date:4/27/2023   Name : Jolly Almonte 57 y.o.       YOB: 1965           Referred by: Meredith Case MD            Chief Complaint   Patient presents with    Diabetes               History of Present Illness: Jolly Almonte is a 57 y.o. male here  for fu  of  Type 2 Diabetes Mellitus .       Type 2 Diabetes was diagnosed in 2016 . End organ effects of diabetes:  none.     Cardiovascular risk factors: dyslipidemia, diabetes mellitus, male gender   Taking statin,   Metformin  Has cut down portions, was not able to exercise due to cold weather  Recent COVID      Generalized itching: Seen allergist/dermatology Dr. Duran, on antihistamines      Hypoglycemia: no                     Physical Examination:      General: pleasant, no distress, good eye contact    HEENT: no exophthalmos, no periorbital edema, EOMI    Neck: No thyromegaly    CVS: S1-S2 regular    RS: Normal respiratory effort    Musculoskeletal: no tremors    Neurological: alert and oriented    Psychiatric: normal mood and affect    Skin: Normal color         Data Reviewed:       Diabetic foot exam: January 2019     Left:      Vibratory sensation normal     Filament test normal sensation with micro filament    Pulse DP: 1+     Deformities: None   Right:     Vibratory sensation normal    Filament test normal sensation with micro filament    Pulse DP: 1+    Deformities: None                 Lab Results   Component Value Date    GFRAA 80 09/01/2021        Lab Results   Component Value Date    LABA1C 6.4 (H) 12/22/2023    LABA1C 6.3 (H) 09/06/2023    LABA1C 6.1 (H) 04/18/2023         Lab Results   Component Value Date    LDLCALC 131 (H) 04/18/2022    TRIG 100 04/18/2022    HDL 35 (L) 04/18/2022     12/22/2023    K 4.6 12/22/2023     12/22/2023    CO2 21 12/22/2023    BUN 15 12/22/2023    CREATININE 1.20 12/22/2023

## 2024-02-13 ENCOUNTER — OFFICE VISIT (OUTPATIENT)
Age: 59
End: 2024-02-13
Payer: COMMERCIAL

## 2024-02-13 VITALS
TEMPERATURE: 97.6 F | SYSTOLIC BLOOD PRESSURE: 104 MMHG | DIASTOLIC BLOOD PRESSURE: 69 MMHG | HEIGHT: 64 IN | HEART RATE: 89 BPM | OXYGEN SATURATION: 97 % | WEIGHT: 177.03 LBS | BODY MASS INDEX: 30.22 KG/M2 | RESPIRATION RATE: 18 BRPM

## 2024-02-13 DIAGNOSIS — M54.50 ACUTE MIDLINE LOW BACK PAIN WITHOUT SCIATICA: Primary | ICD-10-CM

## 2024-02-13 DIAGNOSIS — M62.830 PARASPINAL MUSCLE SPASM: ICD-10-CM

## 2024-02-13 PROCEDURE — 3074F SYST BP LT 130 MM HG: CPT | Performed by: STUDENT IN AN ORGANIZED HEALTH CARE EDUCATION/TRAINING PROGRAM

## 2024-02-13 PROCEDURE — 3078F DIAST BP <80 MM HG: CPT | Performed by: STUDENT IN AN ORGANIZED HEALTH CARE EDUCATION/TRAINING PROGRAM

## 2024-02-13 PROCEDURE — 99213 OFFICE O/P EST LOW 20 MIN: CPT | Performed by: STUDENT IN AN ORGANIZED HEALTH CARE EDUCATION/TRAINING PROGRAM

## 2024-02-13 RX ORDER — NAPROXEN 500 MG/1
500 TABLET ORAL 2 TIMES DAILY WITH MEALS
Qty: 20 TABLET | Refills: 0 | Status: SHIPPED | OUTPATIENT
Start: 2024-02-13

## 2024-02-13 RX ORDER — LIFITEGRAST 50 MG/ML
1 SOLUTION/ DROPS OPHTHALMIC 2 TIMES DAILY
COMMUNITY
Start: 2023-12-30

## 2024-02-13 NOTE — PROGRESS NOTES
Identified pt with two pt identifiers(name and ). Reviewed record in preparation for visit and have obtained necessary documentation.  Chief Complaint   Patient presents with    [facility] [place] [facility]        Health Maintenance Due   Topic    Hepatitis B vaccine (1 of 3 - 3-dose series)    COVID-19 Vaccine (1)    Pneumococcal 0-64 years Vaccine (1 - PCV)    Hepatitis C screen     DTaP/Tdap/Td vaccine (1 - Tdap)    Colorectal Cancer Screen     Shingles vaccine (1 of 2)    Diabetic foot exam     Flu vaccine (1)    Depression Screen        Vitals:    24 1446   BP: 104/69   Site: Right Upper Arm   Position: Sitting   Cuff Size: Medium Adult   Pulse: 89   Resp: 18   Temp: 97.6 °F (36.4 °C)   TempSrc: Oral   SpO2: 97%   Weight: 80.3 kg (177 lb 0.5 oz)   Height: 1.626 m (5' 4\")           Coordination of Care Questionnaire:  :   1. Have you been to the ER, urgent care clinic since your last visit?  Hospitalized since your last visit?No    2. Have you seen or consulted any other health care providers outside of the [facility] since your last visit?  Include any pap smears or colon screening. No    This patient is accompanied in the office by his self.  I have received verbal consent from Jolly Almonte to discuss any/all medical information while they are present in the room.

## 2024-02-13 NOTE — PROGRESS NOTES
[address] [place] [address]   Office [phone], Fax [phone]    Subjective   CC:  Jolly Almonte is a 58 y.o. male who presents for re-establish care.     Back pain:  - Left lower back pain  - Feels pain is more central  - Started 3-4 days ago after changing tire  - No injury, trauma  - Has been using heating pad, Ibupofen - getting somewhat better  - No f/c, night sweats, weight changes, urinary/bowel incontinence, saddle anesthesia      Complete ROS performed and pertinent positives/negatives are documented in HPI.    Past Medical History  Past Medical History:   Diagnosis Date    Allergic rhinitis 2/21/2014    Asthma     Benign essential HTN 2/24/2019    Diabetes (HCC)     History of seasonal allergies     Newly converted positive PPD test 9/26/2012    BARRETT (obstructive sleep apnea)     Uses nightly CPAP machine. Follows with Dr. Angel ([facility]), annual visits       Current Medications  Current Outpatient Medications   Medication Sig Dispense Refill    XIIDRA 5 % SOLN Place 1 drop into both eyes in the morning and at bedtime      mometasone-formoterol (DULERA) 200-5 MCG/ACT inhaler INHALE 2 PUFFS INTO THE LUNGS TWICE A DAY 1 each 0    albuterol sulfate HFA (PROVENTIL;VENTOLIN;PROAIR) 108 (90 Base) MCG/ACT inhaler INHALE 2 PUFFS BY MOUTH EVERY 4 HOURS AS NEEDED FOR WHEEZE 3 each 0    fluticasone (FLONASE) 50 MCG/ACT nasal spray SHAKE LIQUID AND USE 2 SPRAYS IN EACH NOSTRIL EVERY DAY 3 each 1    metFORMIN (GLUCOPHAGE-XR) 500 MG extended release tablet TAKE 1 TABLET BY MOUTH EVERY DAY 90 tablet 3    lisinopril (PRINIVIL;ZESTRIL) 10 MG tablet Take 1 tablet by mouth daily      rosuvastatin (CRESTOR) 10 MG tablet Take 1 tablet by mouth nightly      benzonatate (TESSALON) 200 MG capsule Take 1 capsule by mouth 3 times daily as needed (Patient not taking: Reported on 2/13/2024)      cycloSPORINE, PF, (CEQUA) 0.09 % SOLN INSTILL 1 DROP INTO BOTH EYES TWICE A DAY (Patient not

## 2024-02-17 DIAGNOSIS — J45.40 MODERATE PERSISTENT ASTHMA, UNCOMPLICATED: ICD-10-CM

## 2024-02-19 RX ORDER — ALBUTEROL SULFATE 90 UG/1
2 AEROSOL, METERED RESPIRATORY (INHALATION) EVERY 4 HOURS PRN
Qty: 8.5 EACH | Refills: 2 | Status: SHIPPED | OUTPATIENT
Start: 2024-02-19

## 2024-02-19 RX ORDER — MOMETASONE FUROATE AND FORMOTEROL FUMARATE DIHYDRATE 200; 5 UG/1; UG/1
2 AEROSOL RESPIRATORY (INHALATION) 2 TIMES DAILY
Qty: 13 EACH | OUTPATIENT
Start: 2024-02-19

## 2024-02-29 DIAGNOSIS — J45.40 MODERATE PERSISTENT ASTHMA, UNCOMPLICATED: ICD-10-CM

## 2024-02-29 NOTE — TELEPHONE ENCOUNTER
Yasmani Duran,    Patient is requesting a refill on his medication dulera 200 mcg. I did not see this on his med list.    Please send to:    [facility]/pharmacy #[id number] - [place], [place] - [address] - P [phone] - F [phone]  [address], [facility] [address]  Phone: [phone]  Fax: [phone]

## 2024-03-06 ENCOUNTER — OFFICE VISIT (OUTPATIENT)
Age: 59
End: 2024-03-06
Payer: COMMERCIAL

## 2024-03-06 VITALS
OXYGEN SATURATION: 95 % | TEMPERATURE: 98 F | HEART RATE: 102 BPM | DIASTOLIC BLOOD PRESSURE: 71 MMHG | WEIGHT: 177.6 LBS | RESPIRATION RATE: 18 BRPM | SYSTOLIC BLOOD PRESSURE: 105 MMHG | HEIGHT: 64 IN | BODY MASS INDEX: 30.32 KG/M2

## 2024-03-06 DIAGNOSIS — M62.830 PARASPINAL MUSCLE SPASM: ICD-10-CM

## 2024-03-06 DIAGNOSIS — J45.40 MODERATE PERSISTENT ASTHMA, UNCOMPLICATED: ICD-10-CM

## 2024-03-06 DIAGNOSIS — J30.2 SEASONAL ALLERGIC REACTION: ICD-10-CM

## 2024-03-06 DIAGNOSIS — M54.50 ACUTE MIDLINE LOW BACK PAIN WITHOUT SCIATICA: Primary | ICD-10-CM

## 2024-03-06 PROCEDURE — 3074F SYST BP LT 130 MM HG: CPT | Performed by: STUDENT IN AN ORGANIZED HEALTH CARE EDUCATION/TRAINING PROGRAM

## 2024-03-06 PROCEDURE — 99214 OFFICE O/P EST MOD 30 MIN: CPT | Performed by: STUDENT IN AN ORGANIZED HEALTH CARE EDUCATION/TRAINING PROGRAM

## 2024-03-06 PROCEDURE — 3078F DIAST BP <80 MM HG: CPT | Performed by: STUDENT IN AN ORGANIZED HEALTH CARE EDUCATION/TRAINING PROGRAM

## 2024-03-06 RX ORDER — FLUTICASONE PROPIONATE 50 MCG
SPRAY, SUSPENSION (ML) NASAL
Qty: 3 EACH | Refills: 1 | Status: SHIPPED | OUTPATIENT
Start: 2024-03-06

## 2024-03-06 RX ORDER — NAPROXEN 500 MG/1
500 TABLET ORAL 2 TIMES DAILY WITH MEALS
Qty: 20 TABLET | Refills: 0 | Status: SHIPPED | OUTPATIENT
Start: 2024-03-06

## 2024-03-06 ASSESSMENT — PATIENT HEALTH QUESTIONNAIRE - PHQ9
SUM OF ALL RESPONSES TO PHQ QUESTIONS 1-9: 0
SUM OF ALL RESPONSES TO PHQ QUESTIONS 1-9: 0
SUM OF ALL RESPONSES TO PHQ9 QUESTIONS 1 & 2: 0
1. LITTLE INTEREST OR PLEASURE IN DOING THINGS: 0
SUM OF ALL RESPONSES TO PHQ QUESTIONS 1-9: 0
SUM OF ALL RESPONSES TO PHQ QUESTIONS 1-9: 0
2. FEELING DOWN, DEPRESSED OR HOPELESS: 0

## 2024-03-06 NOTE — PROGRESS NOTES
[facility] Family Medicine Residency   [facility] Sports Medicine      Chief Complaint:   Chief Complaint   Patient presents with    Back Pain     Pt reports for cervical pain & check up       Subjective:   History: This patient is a 58 y.o. male with a chief complaint of back pain.     - Feels pain in mid-back, mostly when trying to maintain good posture.   - Pain started several months ago  - Feels like he is slouching more than usual  - Pain worse with prolonged sitting   - Recently took course of Naproxen for low back pain which did not help with mid-back pain (but did help with low back pain)  - Pain rates 6/10  - No h/o injury  - No bowel or bladder incontinence. No fever, night sweats, or weight changes. No saddle anesthesia.  - Works as a      Review of Systems:  See HPI.    Past Medical History:   Diagnosis Date    Allergic rhinitis 2/21/2014    Asthma     Benign essential HTN 2/24/2019    Diabetes (HCC)     History of seasonal allergies     Newly converted positive PPD test 9/26/2012    BARRETT (obstructive sleep apnea)     Uses nightly CPAP machine. Follows with Dr. Angel ([facility]), annual visits     Family History   Problem Relation Age of Onset    No Known Problems Father     No Known Problems Mother      Current Outpatient Medications   Medication Sig Dispense Refill    fluticasone (FLONASE) 50 MCG/ACT nasal spray SHAKE LIQUID AND USE 2 SPRAYS IN EACH NOSTRIL EVERY DAY 3 each 1    naproxen (NAPROSYN) 500 MG tablet Take 1 tablet by mouth 2 times daily (with meals) 20 tablet 0    mometasone-formoterol (DULERA) 200-5 MCG/ACT inhaler Inhale 2 puffs into the lungs in the morning and at bedtime 1 each 0    albuterol sulfate HFA (PROVENTIL;VENTOLIN;PROAIR) 108 (90 Base) MCG/ACT inhaler INHALE 2 PUFFS BY MOUTH EVERY 4 HOURS AS NEEDED FOR WHEEZING 8.5 each 2    metFORMIN (GLUCOPHAGE-XR) 500 MG extended release tablet TAKE 1 TABLET BY MOUTH EVERY DAY

## 2024-03-06 NOTE — PATIENT INSTRUCTIONS
Schedule an appointment for an annual physical at your earliest convenience.    Do stretches regularly at home.    Follow-up in 2 months for back pain if needed.

## 2024-03-06 NOTE — PROGRESS NOTES
Patient has been identified by name and .    Chief Complaint   Patient presents with    Back Pain     Pt reports for cervical pain & check up       Vitals:    24 1529   BP: 105/71   Site: Left Upper Arm   Position: Sitting   Cuff Size: Medium Adult   Pulse: (!) 102   Resp: 18   Temp: 98 °F (36.7 °C)   TempSrc: Oral   SpO2: 95%   Weight: 80.6 kg (177 lb 9.6 oz)   Height: 1.626 m (5' 4\")        \"Have you been to the ER, urgent care clinic since your last visit?  Hospitalized since your last visit?\"    NO    “Have you seen or consulted any other health care providers outside of [facility] since your last visit?”    NO    “Have you had a colorectal cancer screening such as a colonoscopy/FIT/Cologuard?    NO

## 2024-03-22 DIAGNOSIS — J45.40 MODERATE PERSISTENT ASTHMA, UNCOMPLICATED: ICD-10-CM

## 2024-03-22 DIAGNOSIS — I10 ESSENTIAL (PRIMARY) HYPERTENSION: ICD-10-CM

## 2024-03-22 DIAGNOSIS — E11.9 TYPE 2 DIABETES MELLITUS WITHOUT COMPLICATIONS (HCC): ICD-10-CM

## 2024-03-22 RX ORDER — MONTELUKAST SODIUM 10 MG/1
10 TABLET ORAL DAILY
Qty: 30 TABLET | Refills: 0 | OUTPATIENT
Start: 2024-03-22

## 2024-03-22 RX ORDER — LISINOPRIL 10 MG/1
10 TABLET ORAL DAILY
Qty: 90 TABLET | Refills: 3 | OUTPATIENT
Start: 2024-03-22

## 2024-03-22 NOTE — TELEPHONE ENCOUNTER
Medication Refill Request    Jolly Almonte is requesting a refill of the following medication(s):   Requested Prescriptions     Pending Prescriptions Disp Refills    DULERA 200-5 MCG/ACT inhaler [Pharmacy Med Name: DULERA 200 MCG-5 MCG INHALER] 13 each      Sig: INHALE 2 PUFFS INTO THE LUNGS IN THE MORNING AND AT BEDTIME        Listed PCP is Amy Fong MD   Last provider to prescribe medication: Dr. Duran  Last Date of Medication Prescribed:  03/01/2024  Date of Last Office Visit at [facility]: 02/13/2024       Future Appointment:   Future Appointments   Date Time Provider Department Center   4/15/2024  1:45 PM Sravanthi Cooper MD CDE BS AMB       Please send refill to:    [facility]/pharmacy #[id number] - [place], [place] - [address] - P [phone] - F [phone]  [address] [facility] [address]  Phone: [phone] Fax: [phone]      ////

## 2024-03-26 RX ORDER — MOMETASONE FUROATE AND FORMOTEROL FUMARATE DIHYDRATE 200; 5 UG/1; UG/1
AEROSOL RESPIRATORY (INHALATION)
Qty: 13 EACH | Refills: 0 | Status: SHIPPED | OUTPATIENT
Start: 2024-03-26

## 2024-03-27 NOTE — LETTER
9/28/20 Patient: Ziyad Lynn  
YOB: 1965 Date of Visit: 9/28/2020 Yaron Hidalgo MD 
[address] [place] [address] VIA In Basket Dear Yaron Hidalgo MD, Thank you for referring . Ziyad Lynn to [facility] DI[facility]OLOGY for evaluation. My notes for this consultation are attached. If you have questions, please do not hesitate to call me. I look forward to following your patient along with you. Sincerely, Jaqueline Al MD 
 
 Multiple views of the left coronary artery obtained using hand injection.

## 2024-04-02 ENCOUNTER — OFFICE VISIT (OUTPATIENT)
Age: 59
End: 2024-04-02
Payer: COMMERCIAL

## 2024-04-02 VITALS
TEMPERATURE: 98.4 F | HEART RATE: 85 BPM | DIASTOLIC BLOOD PRESSURE: 76 MMHG | BODY MASS INDEX: 29.19 KG/M2 | RESPIRATION RATE: 16 BRPM | HEIGHT: 64 IN | SYSTOLIC BLOOD PRESSURE: 137 MMHG | WEIGHT: 171 LBS | OXYGEN SATURATION: 95 %

## 2024-04-02 DIAGNOSIS — N40.1 BENIGN PROSTATIC HYPERPLASIA WITH NOCTURIA: ICD-10-CM

## 2024-04-02 DIAGNOSIS — J44.1 COPD WITH ACUTE EXACERBATION (HCC): Primary | ICD-10-CM

## 2024-04-02 DIAGNOSIS — R35.1 BENIGN PROSTATIC HYPERPLASIA WITH NOCTURIA: ICD-10-CM

## 2024-04-02 DIAGNOSIS — J30.2 SEASONAL ALLERGIC REACTION: ICD-10-CM

## 2024-04-02 DIAGNOSIS — J45.40 MODERATE PERSISTENT ASTHMA, UNCOMPLICATED: ICD-10-CM

## 2024-04-02 PROCEDURE — 99214 OFFICE O/P EST MOD 30 MIN: CPT

## 2024-04-02 RX ORDER — MONTELUKAST SODIUM 10 MG/1
10 TABLET ORAL NIGHTLY
Qty: 90 TABLET | Refills: 3 | Status: SHIPPED | OUTPATIENT
Start: 2024-04-02

## 2024-04-02 RX ORDER — TAMSULOSIN HYDROCHLORIDE 0.4 MG/1
0.4 CAPSULE ORAL DAILY
Qty: 30 CAPSULE | Refills: 5 | Status: SHIPPED | OUTPATIENT
Start: 2024-04-02

## 2024-04-02 RX ORDER — BENZONATATE 100 MG/1
100 CAPSULE ORAL 3 TIMES DAILY PRN
Qty: 30 CAPSULE | Refills: 0 | Status: SHIPPED | OUTPATIENT
Start: 2024-04-02 | End: 2024-04-12

## 2024-04-02 RX ORDER — AZITHROMYCIN 250 MG/1
TABLET, FILM COATED ORAL
Qty: 6 TABLET | Refills: 0 | Status: SHIPPED | OUTPATIENT
Start: 2024-04-02 | End: 2024-04-12

## 2024-04-02 RX ORDER — PREDNISONE 20 MG/1
60 TABLET ORAL DAILY
Qty: 15 TABLET | Refills: 0 | Status: SHIPPED | OUTPATIENT
Start: 2024-04-02 | End: 2024-04-07

## 2024-04-02 ASSESSMENT — PATIENT HEALTH QUESTIONNAIRE - PHQ9
SUM OF ALL RESPONSES TO PHQ QUESTIONS 1-9: 0
SUM OF ALL RESPONSES TO PHQ QUESTIONS 1-9: 0
1. LITTLE INTEREST OR PLEASURE IN DOING THINGS: NOT AT ALL
SUM OF ALL RESPONSES TO PHQ QUESTIONS 1-9: 0
2. FEELING DOWN, DEPRESSED OR HOPELESS: NOT AT ALL
SUM OF ALL RESPONSES TO PHQ QUESTIONS 1-9: 0
SUM OF ALL RESPONSES TO PHQ9 QUESTIONS 1 & 2: 0

## 2024-04-02 NOTE — PROGRESS NOTES
Identified pt with two pt identifiers(name and ). Reviewed record in preparation for visit and have obtained necessary documentation.  Chief Complaint   Patient presents with    Cough    Congestion    Started x 1 week ago, cough with chest congestion , wheezing. Weather changes causing allergies    Using inhalers but doesn't feel like working    + sneezing , runny nose    Has not checked for covid at home   Had CPAP machine years ago but hasn't used in 3+ years     Health Maintenance Due   Topic    Hepatitis B vaccine (1 of 3 - 3-dose series)    COVID-19 Vaccine (1)    Pneumococcal 0-64 years Vaccine (1 of 2 - PCV)    Hepatitis C screen     DTaP/Tdap/Td vaccine (1 - Tdap)    Colorectal Cancer Screen     Shingles vaccine (1 of 2)    Diabetic foot exam        Vitals:    24 1457   BP: 137/76   Site: Left Upper Arm   Position: Sitting   Cuff Size: Large Adult   Pulse: 85   Resp: 16   Temp: 98.4 °F (36.9 °C)   TempSrc: Temporal   SpO2: 95%   Weight: 77.6 kg (171 lb)   Height: 1.626 m (5' 4\")           Coordination of Care Questionnaire:  :   1. Have you been to the ER, urgent care clinic since your last visit?  Hospitalized since your last visit?No    2. Have you seen or consulted any other health care providers outside of the [facility] since your last visit?  Include any pap smears or colon screening. No    This patient is accompanied in the office by his self.  I have received verbal consent from Jolly Almonte to discuss any/all medical information while they are present in the room.

## 2024-04-02 NOTE — PROGRESS NOTES
[address] [id number]   Office [phone], Fax [phone]  Subjective   Jolly Almonte is a 58 y.o. male who presents for:    #cough w/ mucus, chest congestion: has a h/o asthma on Dulera BID and albuterol   - started a week ago  - sick contacts: none  - increased SOB, cough and sputum production  - sputum: green/white alternate, medium, thick in consistency  - has noticed increased wheezing, feels like his inhaler is not working   - has been needing albuterol: x2/day, has had to wake up in the middle of the night due to wheezing  - denies fever, chills, nausea, vomiting, loose stools        #BPH: was taking Flomax 0.4 mg daily   - urinary hesitancy   - weak flow  - getting up in the middle of the night to urinate   - has been doing Ramadan fasting, so drinking water later in the night causing him to wake up at least once   - has tried going to the restroom before bed without improvement.     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 Follows with Dr. Angel ([facility]), annual visits       Current Medications  Current Outpatient Medications   Medication Sig Dispense Refill    montelukast (SINGULAIR) 10 MG tablet Take 1 tablet by mouth nightly 90 tablet 3    benzonatate (TESSALON) 100 MG capsule Take 1 capsule by mouth 3 times daily as needed for Cough 30 capsule 0    tamsulosin (FLOMAX) 0.4 MG capsule Take 1 capsule by mouth daily 30 capsule 5    predniSONE (DELTASONE) 20 MG tablet Take 3 tablets by mouth daily for 5 days 15 tablet 0    azithromycin (ZITHROMAX) 250 MG tablet 500mg on day 1 followed by 250mg on days 2 - 5 6 tablet 0

## 2024-04-10 LAB
ALBUMIN/CREAT UR: <6 MG/G CREAT (ref 0–29)
BUN SERPL-MCNC: 21 MG/DL (ref 6–24)
BUN/CREAT SERPL: 17 (ref 9–20)
CALCIUM SERPL-MCNC: 9.4 MG/DL (ref 8.7–10.2)
CHLORIDE SERPL-SCNC: 104 MMOL/L (ref 96–106)
CO2 SERPL-SCNC: 20 MMOL/L (ref 20–29)
CREAT SERPL-MCNC: 1.25 MG/DL (ref 0.76–1.27)
CREAT UR-MCNC: 53.4 MG/DL
EGFRCR SERPLBLD CKD-EPI 2021: 67 ML/MIN/1.73
GLUCOSE SERPL-MCNC: 131 MG/DL (ref 70–99)
HBA1C MFR BLD: 6.5 % (ref 4.8–5.6)
LDLC SERPL DIRECT ASSAY-MCNC: 85 MG/DL (ref 0–99)
MICROALBUMIN UR-MCNC: <3 UG/ML
POTASSIUM SERPL-SCNC: 4.8 MMOL/L (ref 3.5–5.2)
SODIUM SERPL-SCNC: 142 MMOL/L (ref 134–144)

## 2024-04-15 ENCOUNTER — OFFICE VISIT (OUTPATIENT)
Age: 59
End: 2024-04-15
Payer: COMMERCIAL

## 2024-04-15 VITALS
SYSTOLIC BLOOD PRESSURE: 99 MMHG | BODY MASS INDEX: 29.02 KG/M2 | DIASTOLIC BLOOD PRESSURE: 71 MMHG | OXYGEN SATURATION: 100 % | TEMPERATURE: 98.8 F | HEIGHT: 64 IN | HEART RATE: 100 BPM | RESPIRATION RATE: 16 BRPM | WEIGHT: 170 LBS

## 2024-04-15 DIAGNOSIS — E78.00 HYPERCHOLESTEROLEMIA: ICD-10-CM

## 2024-04-15 DIAGNOSIS — E11.65 TYPE 2 DIABETES MELLITUS WITH HYPERGLYCEMIA, WITHOUT LONG-TERM CURRENT USE OF INSULIN (HCC): Primary | ICD-10-CM

## 2024-04-15 DIAGNOSIS — I10 BENIGN ESSENTIAL HTN: ICD-10-CM

## 2024-04-15 PROCEDURE — 3074F SYST BP LT 130 MM HG: CPT | Performed by: INTERNAL MEDICINE

## 2024-04-15 PROCEDURE — 3078F DIAST BP <80 MM HG: CPT | Performed by: INTERNAL MEDICINE

## 2024-04-15 PROCEDURE — 3044F HG A1C LEVEL LT 7.0%: CPT | Performed by: INTERNAL MEDICINE

## 2024-04-15 PROCEDURE — 99214 OFFICE O/P EST MOD 30 MIN: CPT | Performed by: INTERNAL MEDICINE

## 2024-05-29 ENCOUNTER — TELEPHONE (OUTPATIENT)
Age: 59
End: 2024-05-29

## 2024-05-29 ENCOUNTER — PATIENT MESSAGE (OUTPATIENT)
Age: 59
End: 2024-05-29

## 2024-05-29 RX ORDER — LISINOPRIL 10 MG/1
10 TABLET ORAL DAILY
Qty: 90 TABLET | Refills: 0 | Status: SHIPPED | OUTPATIENT
Start: 2024-05-29

## 2024-05-29 NOTE — TELEPHONE ENCOUNTER
Patient called requesting a refill on Lisinopril 10 mg. Patient is currently out of the medication. Would like for prescription to be sent to [facility] on [address]    Thanks!

## 2024-06-10 ENCOUNTER — OFFICE VISIT (OUTPATIENT)
Age: 59
End: 2024-06-10
Payer: COMMERCIAL

## 2024-06-10 VITALS
TEMPERATURE: 97.8 F | OXYGEN SATURATION: 97 % | SYSTOLIC BLOOD PRESSURE: 100 MMHG | DIASTOLIC BLOOD PRESSURE: 65 MMHG | WEIGHT: 177 LBS | BODY MASS INDEX: 30.22 KG/M2 | RESPIRATION RATE: 18 BRPM | HEIGHT: 64 IN | HEART RATE: 98 BPM

## 2024-06-10 DIAGNOSIS — J30.2 SEASONAL ALLERGIC REACTION: ICD-10-CM

## 2024-06-10 DIAGNOSIS — J45.41 MODERATE PERSISTENT ASTHMA WITH ACUTE EXACERBATION: ICD-10-CM

## 2024-06-10 PROCEDURE — 3074F SYST BP LT 130 MM HG: CPT

## 2024-06-10 PROCEDURE — 3078F DIAST BP <80 MM HG: CPT

## 2024-06-10 PROCEDURE — 99213 OFFICE O/P EST LOW 20 MIN: CPT

## 2024-06-10 RX ORDER — PREDNISONE 20 MG/1
40 TABLET ORAL DAILY
Qty: 10 TABLET | Refills: 0 | Status: SHIPPED | OUTPATIENT
Start: 2024-06-10 | End: 2024-06-15

## 2024-06-10 RX ORDER — BENZONATATE 100 MG/1
100 CAPSULE ORAL 2 TIMES DAILY PRN
Qty: 20 CAPSULE | Refills: 3 | Status: SHIPPED | OUTPATIENT
Start: 2024-06-10 | End: 2024-07-20

## 2024-06-10 RX ORDER — ALBUTEROL SULFATE 90 UG/1
2 AEROSOL, METERED RESPIRATORY (INHALATION) EVERY 4 HOURS PRN
Qty: 8.5 EACH | Refills: 2 | Status: SHIPPED | OUTPATIENT
Start: 2024-06-10

## 2024-06-10 RX ORDER — FLUTICASONE PROPIONATE 50 MCG
SPRAY, SUSPENSION (ML) NASAL
Qty: 3 EACH | Refills: 1 | Status: SHIPPED | OUTPATIENT
Start: 2024-06-10

## 2024-06-10 NOTE — PROGRESS NOTES
[address] [id number]   Office [phone], Fax [phone]      Chief Complaint:     Chief Complaint   Patient presents with    Cough           Subjective:   HPI:  Jolly Almonte is a 58 y.o. male that presents for:    Patient is here with 1 week of cough with scanty white sputum, mild wheezing, and congestion. No fever, chills, SOB, nausea, vomiting, abdominal pain, dysuria, ear pain, diarrhea, headache, seizures, sick contacts.   So far has tried montelukast, prednisone (few tablets left over from last exacerbation), robitussin w/o much relief. Has needed to take albuterol q4h and Dulera BID as well.  Non smoker    Please note that this dictation was completed with [employer], the [employer] voice recognition software.  Quite often unanticipated grammatical, syntax, homophones, and other interpretive errors are inadvertently transcribed by the computer software.  Please disregard these errors.  Please excuse any errors that have escaped final proofreading.     Past medical history, social history, medications, and allergies personally reviewed.      Medications:   Current Outpatient Medications   Medication Sig    fluticasone (FLONASE) 50 MCG/ACT nasal spray SHAKE LIQUID AND USE 2 SPRAYS IN EACH NOSTRIL EVERY DAY    albuterol sulfate HFA (PROVENTIL;VENTOLIN;PROAIR) 108 (90 Base) MCG/ACT inhaler Inhale 2 puffs into the lungs every 4 hours as needed for Wheezing    benzonatate (TESSALON) 100 MG capsule Take 1 capsule by mouth 2 times daily as needed for Cough    predniSONE (DELTASONE) 20 MG tablet Take 2 tablets by mouth daily for 5 days    lisinopril (PRINIVIL;ZESTRIL) 10 MG tablet Take 1 tablet by mouth daily    montelukast (SINGULAIR) 10 MG tablet Take 1 tablet by mouth nightly    DULERA 200-5 MCG/ACT inhaler INHALE 2 PUFFS INTO THE LUNGS IN THE MORNING AND AT BEDTIME    metFORMIN (GLUCOPHAGE-XR) 500 MG extended release tablet TAKE 1 TABLET BY MOUTH EVERY DAY    rosuvastatin (CRESTOR) 10

## 2024-06-10 NOTE — PROGRESS NOTES
Room 17    Patient is accompanied by self. I have received verbal consent from Jolly Almonte to discuss any/all medical information while they are present in the room.    Identified pt with two pt identifiers(name and ). Reviewed record in preparation for visit and have obtained necessary documentation.  Chief Complaint   Patient presents with    Cough     Cough / wheezing / chest congestion / shortness of breath / x 1 week    Inhalers not helping    Denies F/C        Health Maintenance Due   Topic    Hepatitis B vaccine (1 of 3 - 3-dose series)    COVID-19 Vaccine (1)    Pneumococcal 0-64 years Vaccine (1 of 2 - PCV)    Hepatitis C screen     DTaP/Tdap/Td vaccine (1 - Tdap)    Colorectal Cancer Screen     Shingles vaccine (1 of 2)    Diabetic foot exam        Vitals:    06/10/24 1538   BP: 100/65   Site: Left Lower Arm   Position: Sitting   Cuff Size: Large Adult   Pulse: 98   Resp: 18   Temp: 97.8 °F (36.6 °C)   TempSrc: Temporal   SpO2: 97%   Weight: 80.3 kg (177 lb)   Height: 1.626 m (5' 4\")       Social Determinants Of Health:       SDOH screening not required at visit.  Resources Declined.   See AVS for attached resources, if requested.    Coordination of Care Questionnaire:       \"Have you been to the ER, urgent care clinic since your last visit?  Hospitalized since your last visit?\"    NO    “Have you seen or consulted any other health care providers outside of [facility] since your last visit?”    NO        “Have you had a colorectal cancer screening such as a colonoscopy/FIT/Cologuard?    NO    No colonoscopy on file  No cologuard on file  No FIT/FOBT on file   No flexible sigmoidoscopy on file         Click Here for Release of Records Request

## 2024-06-18 DIAGNOSIS — J45.41 MODERATE PERSISTENT ASTHMA WITH ACUTE EXACERBATION: ICD-10-CM

## 2024-06-18 RX ORDER — PREDNISONE 20 MG/1
TABLET ORAL
Qty: 10 TABLET | Refills: 0 | OUTPATIENT
Start: 2024-06-18

## 2024-06-18 NOTE — TELEPHONE ENCOUNTER
Medication Refill Request    Jolly Almonte is requesting a refill of the following medication(s):   Requested Prescriptions     Pending Prescriptions Disp Refills    predniSONE (DELTASONE) 20 MG tablet [Pharmacy Med Name: PREDNISONE 20 MG TABLET] 10 tablet 0     Sig: TAKE 2 TABLETS BY MOUTH EVERY DAY FOR 5 DAYS        Listed PCP is Andria Enriquez MD   Last provider to prescribe medication: Michael  Last Date of Medication Prescribed: 04/02/2024  Date of Last Office Visit at [facility]: 06/10/2024  FUTURE APPOINTMENT:   Future Appointments   Date Time Provider Department Center   8/19/2024  1:30 PM Sravanthi Cooper MD CDE BS AMB       Please send refill to:    [facility]/pharmacy #[id number] - [place], [place] - [address] - P [phone] - F [phone]  [address] [facility] [address]  Phone: [phone] Fax: [phone]      Please review request and approve or deny with recommendations.

## 2024-07-04 DIAGNOSIS — E11.9 TYPE 2 DIABETES MELLITUS WITHOUT COMPLICATIONS (HCC): ICD-10-CM

## 2024-07-05 RX ORDER — METFORMIN HYDROCHLORIDE 500 MG/1
TABLET, EXTENDED RELEASE ORAL
Qty: 90 TABLET | Refills: 3 | Status: SHIPPED | OUTPATIENT
Start: 2024-07-05

## 2024-07-12 ENCOUNTER — CLINICAL DOCUMENTATION (OUTPATIENT)
Age: 59
End: 2024-07-12

## 2024-07-12 NOTE — PROGRESS NOTES
[facility] for nonvenomous insect bites.  There is no signs of serious systemic reaction.  He was advised to take over-the-counter Benadryl and a course of prednisone 20 mg.    eBatrice Pearl MD  2:01 PM

## 2024-08-13 LAB
BUN SERPL-MCNC: 11 MG/DL (ref 6–24)
BUN/CREAT SERPL: 8 (ref 9–20)
CALCIUM SERPL-MCNC: 9.3 MG/DL (ref 8.7–10.2)
CHLORIDE SERPL-SCNC: 104 MMOL/L (ref 96–106)
CO2 SERPL-SCNC: 20 MMOL/L (ref 20–29)
CREAT SERPL-MCNC: 1.37 MG/DL (ref 0.76–1.27)
EGFRCR SERPLBLD CKD-EPI 2021: 60 ML/MIN/1.73
GLUCOSE SERPL-MCNC: 96 MG/DL (ref 70–99)
HBA1C MFR BLD: 6.2 % (ref 4.8–5.6)
LDLC SERPL DIRECT ASSAY-MCNC: 88 MG/DL (ref 0–99)
POTASSIUM SERPL-SCNC: 5.1 MMOL/L (ref 3.5–5.2)
SODIUM SERPL-SCNC: 139 MMOL/L (ref 134–144)

## 2024-08-19 ENCOUNTER — OFFICE VISIT (OUTPATIENT)
Age: 59
End: 2024-08-19
Payer: COMMERCIAL

## 2024-08-19 VITALS
WEIGHT: 171.9 LBS | DIASTOLIC BLOOD PRESSURE: 69 MMHG | SYSTOLIC BLOOD PRESSURE: 103 MMHG | HEART RATE: 86 BPM | HEIGHT: 64 IN | OXYGEN SATURATION: 99 % | BODY MASS INDEX: 29.35 KG/M2 | TEMPERATURE: 97.6 F

## 2024-08-19 DIAGNOSIS — I10 BENIGN ESSENTIAL HTN: ICD-10-CM

## 2024-08-19 DIAGNOSIS — E78.00 HYPERCHOLESTEROLEMIA: ICD-10-CM

## 2024-08-19 DIAGNOSIS — E11.65 TYPE 2 DIABETES MELLITUS WITH HYPERGLYCEMIA, WITHOUT LONG-TERM CURRENT USE OF INSULIN (HCC): Primary | ICD-10-CM

## 2024-08-19 PROCEDURE — 3078F DIAST BP <80 MM HG: CPT | Performed by: INTERNAL MEDICINE

## 2024-08-19 PROCEDURE — 3044F HG A1C LEVEL LT 7.0%: CPT | Performed by: INTERNAL MEDICINE

## 2024-08-19 PROCEDURE — 3074F SYST BP LT 130 MM HG: CPT | Performed by: INTERNAL MEDICINE

## 2024-08-19 PROCEDURE — 99214 OFFICE O/P EST MOD 30 MIN: CPT | Performed by: INTERNAL MEDICINE

## 2024-08-19 RX ORDER — ROSUVASTATIN CALCIUM 10 MG/1
10 TABLET, COATED ORAL NIGHTLY
Qty: 90 TABLET | Refills: 2 | Status: SHIPPED | OUTPATIENT
Start: 2024-08-19

## 2024-08-19 NOTE — PROGRESS NOTES
[facility] DIABETES AND ENDOCRINOLOGY                 Sravanthi Cooper MD                 Referred by: Meredith Case MD            Chief Complaint   Patient presents with    Diabetes               History of Present Illness: Jolly Almonte is here  for fu  of  Type 2 Diabetes Mellitus .       Type 2 Diabetes was diagnosed in 2016 . End organ effects of diabetes:  none.     Cardiovascular risk factors: dyslipidemia, diabetes mellitus, male gender   Taking statin,  Metformin   [place] of [place] 6 [place]    Generalized itching: Seen allergist/dermatology Dr. Duran, on antihistamines      Hypoglycemia: no                     Physical Examination:      General: pleasant, no distress, good eye contact    HEENT: no exophthalmos, no periorbital edema, EOMI    Neck: No thyromegaly    CVS: S1-S2 regular    RS: Normal respiratory effort    Musculoskeletal: no tremors    Neurological: alert and oriented    Psychiatric: normal mood and affect    Skin: Normal color         Data Reviewed:                 Lab Results   Component Value Date    GFRAA 80 09/01/2021        Lab Results   Component Value Date    LABA1C 6.2 (H) 08/12/2024    LABA1C 6.5 (H) 04/09/2024    LABA1C 6.4 (H) 12/22/2023         Lab Results   Component Value Date    TRIG 100 04/18/2022    HDL 35 (L) 04/18/2022     08/12/2024    K 5.1 08/12/2024     08/12/2024    CO2 20 08/12/2024    BUN 11 08/12/2024    CREATININE 1.37 (H) 08/12/2024    GFRAA 80 09/01/2021    GLUCOSE 96 08/12/2024    CALCIUM 9.3 08/12/2024    MALBCR <6 04/09/2024                        Assessment/Plan:              1. Type 2 Diabetes Mellitus      Lab Results   Component Value Date    LABA1C 6.2 (H) 08/12/2024          Controlled   On metformin      2.   Hyperlipidemia  : Statin:          4.Obesity:Body mass index is 29.18 kg/m².   Discussed about the importance of exercise and carbohydrate portion control.      5.  Itching /rash: Followed by allergist

## 2024-08-26 DIAGNOSIS — J45.41 MODERATE PERSISTENT ASTHMA WITH ACUTE EXACERBATION: ICD-10-CM

## 2024-08-27 RX ORDER — ALBUTEROL SULFATE 90 UG/1
AEROSOL, METERED RESPIRATORY (INHALATION)
Qty: 8.5 EACH | Refills: 2 | Status: SHIPPED | OUTPATIENT
Start: 2024-08-27

## 2024-08-27 NOTE — TELEPHONE ENCOUNTER
Medication Refill Request    Jolly Almonte is requesting a refill of the following medication(s):   Requested Prescriptions     Pending Prescriptions Disp Refills    albuterol sulfate HFA (PROVENTIL;VENTOLIN;PROAIR) 108 (90 Base) MCG/ACT inhaler [Pharmacy Med Name: ALBUTEROL HFA (PROAIR) INHALER] 8.5 each 2     Sig: INHALE 2 PUFFS BY MOUTH EVERY 4 HOURS AS NEEDED FOR WHEEZE        Listed PCP is Beatrice Pearl MD   Last provider to prescribe medication: Dr. Rodriguez   Last Date of Medication Prescribed: 06/10/2024   Date of Last Office Visit at [facility]: 06/10/2024     Future Appointment:   Future Appointments   Date Time Provider Department Center   8/28/2024  1:20 PM Zaina Anderson DO [facility] ECC DEP   2/24/2025  1:00 PM Sravanthi Cooper MD CDE BS [facility]       Please send refill to:    [facility]/pharmacy #[id number] - [place], [place] - [address] - P [phone] - F [phone]  [address] [facility] [address]  Phone: [phone] Fax: [phone]

## 2024-08-28 ENCOUNTER — OFFICE VISIT (OUTPATIENT)
Age: 59
End: 2024-08-28
Payer: COMMERCIAL

## 2024-08-28 VITALS
HEIGHT: 64 IN | TEMPERATURE: 97.9 F | BODY MASS INDEX: 29.37 KG/M2 | HEART RATE: 94 BPM | DIASTOLIC BLOOD PRESSURE: 64 MMHG | SYSTOLIC BLOOD PRESSURE: 105 MMHG | RESPIRATION RATE: 18 BRPM | OXYGEN SATURATION: 96 % | WEIGHT: 172 LBS

## 2024-08-28 DIAGNOSIS — E11.65 TYPE 2 DIABETES MELLITUS WITH HYPERGLYCEMIA, WITHOUT LONG-TERM CURRENT USE OF INSULIN (HCC): ICD-10-CM

## 2024-08-28 DIAGNOSIS — E78.00 HYPERCHOLESTEROLEMIA: ICD-10-CM

## 2024-08-28 DIAGNOSIS — E11.21 TYPE 2 DIABETES WITH NEPHROPATHY (HCC): Primary | ICD-10-CM

## 2024-08-28 DIAGNOSIS — R79.89 ELEVATED SERUM CREATININE: ICD-10-CM

## 2024-08-28 DIAGNOSIS — I10 BENIGN ESSENTIAL HTN: ICD-10-CM

## 2024-08-28 PROCEDURE — 3078F DIAST BP <80 MM HG: CPT | Performed by: FAMILY MEDICINE

## 2024-08-28 PROCEDURE — 99213 OFFICE O/P EST LOW 20 MIN: CPT | Performed by: FAMILY MEDICINE

## 2024-08-28 PROCEDURE — 3074F SYST BP LT 130 MM HG: CPT | Performed by: FAMILY MEDICINE

## 2024-08-28 PROCEDURE — 3044F HG A1C LEVEL LT 7.0%: CPT | Performed by: FAMILY MEDICINE

## 2024-08-28 RX ORDER — METFORMIN HCL 500 MG
500 TABLET, EXTENDED RELEASE 24 HR ORAL DAILY
Qty: 90 TABLET | Refills: 3 | Status: SHIPPED | OUTPATIENT
Start: 2024-08-28

## 2024-08-28 RX ORDER — LISINOPRIL 10 MG/1
10 TABLET ORAL DAILY
Qty: 90 TABLET | Refills: 3 | Status: SHIPPED | OUTPATIENT
Start: 2024-08-28

## 2024-08-28 RX ORDER — ROSUVASTATIN CALCIUM 10 MG/1
10 TABLET, COATED ORAL NIGHTLY
Qty: 90 TABLET | Refills: 2 | Status: SHIPPED | OUTPATIENT
Start: 2024-08-28

## 2024-08-28 ASSESSMENT — PATIENT HEALTH QUESTIONNAIRE - PHQ9
SUM OF ALL RESPONSES TO PHQ9 QUESTIONS 1 & 2: 0
2. FEELING DOWN, DEPRESSED OR HOPELESS: NOT AT ALL
1. LITTLE INTEREST OR PLEASURE IN DOING THINGS: NOT AT ALL
SUM OF ALL RESPONSES TO PHQ QUESTIONS 1-9: 0

## 2024-09-04 ENCOUNTER — LAB (OUTPATIENT)
Age: 59
End: 2024-09-04

## 2024-09-04 DIAGNOSIS — R79.89 ELEVATED SERUM CREATININE: ICD-10-CM

## 2024-09-04 DIAGNOSIS — J45.41 MODERATE PERSISTENT ASTHMA WITH ACUTE EXACERBATION: ICD-10-CM

## 2024-09-04 RX ORDER — ALBUTEROL SULFATE 90 UG/1
AEROSOL, METERED RESPIRATORY (INHALATION)
Qty: 8.5 EACH | Refills: 2 | OUTPATIENT
Start: 2024-09-04

## 2024-09-05 LAB
ANION GAP SERPL CALC-SCNC: 4 MMOL/L (ref 2–12)
BUN SERPL-MCNC: 12 MG/DL (ref 6–20)
BUN/CREAT SERPL: 9 (ref 12–20)
CALCIUM SERPL-MCNC: 9 MG/DL (ref 8.5–10.1)
CHLORIDE SERPL-SCNC: 108 MMOL/L (ref 97–108)
CO2 SERPL-SCNC: 24 MMOL/L (ref 21–32)
CREAT SERPL-MCNC: 1.31 MG/DL (ref 0.7–1.3)
GLUCOSE SERPL-MCNC: 79 MG/DL (ref 65–100)
POTASSIUM SERPL-SCNC: 4.9 MMOL/L (ref 3.5–5.1)
SODIUM SERPL-SCNC: 136 MMOL/L (ref 136–145)

## 2024-09-14 DIAGNOSIS — J45.40 MODERATE PERSISTENT ASTHMA, UNCOMPLICATED: ICD-10-CM

## 2024-09-17 RX ORDER — MOMETASONE FUROATE AND FORMOTEROL FUMARATE DIHYDRATE 200; 5 UG/1; UG/1
AEROSOL RESPIRATORY (INHALATION)
Qty: 13 EACH | Refills: 3 | Status: SHIPPED | OUTPATIENT
Start: 2024-09-17

## 2025-02-18 LAB
ALBUMIN/CREAT UR: <8 MG/G CREAT (ref 0–29)
BUN SERPL-MCNC: 11 MG/DL (ref 6–24)
BUN/CREAT SERPL: 10 (ref 9–20)
CALCIUM SERPL-MCNC: 9.1 MG/DL (ref 8.7–10.2)
CHLORIDE SERPL-SCNC: 105 MMOL/L (ref 96–106)
CO2 SERPL-SCNC: 21 MMOL/L (ref 20–29)
CREAT SERPL-MCNC: 1.15 MG/DL (ref 0.76–1.27)
CREAT UR-MCNC: 35.3 MG/DL
EGFRCR SERPLBLD CKD-EPI 2021: 74 ML/MIN/1.73
GLUCOSE SERPL-MCNC: 93 MG/DL (ref 70–99)
HBA1C MFR BLD: 6.2 % (ref 4.8–5.6)
LDLC SERPL DIRECT ASSAY-MCNC: 112 MG/DL (ref 0–99)
MICROALBUMIN UR-MCNC: <3 UG/ML
POTASSIUM SERPL-SCNC: 5.2 MMOL/L (ref 3.5–5.2)
SODIUM SERPL-SCNC: 141 MMOL/L (ref 134–144)

## 2025-02-21 ENCOUNTER — TELEPHONE (OUTPATIENT)
Age: 60
End: 2025-02-21

## 2025-02-21 NOTE — TELEPHONE ENCOUNTER
----- Message from Graciela HASSAN sent at 2/21/2025 10:22 AM EST -----  Regarding: ECC Escalation To Practice  ECC Escalation To Practice      Type of Escalation: Acute Care Symptom  --------------------------------------------------------------------------------------------------------------------------    Information for Provider:  Patient is looking for appointment for: Symptom chest congestion / flu / coughing foe a week     Reasons for Message: Other no available appointment during search     Additional Information : Patient request to be seen as soon as possible he requested to go to the urgent care in [address] [place] [address] patient request to inform Zaina Sarkar DO that he is going to the walk in clinic this afternoon kindly contact the patient please, thank you      --------------------------------------------------------------------------------------------------------------------------    Relationship to Patient: Self  Call Back Info: OK to leave message on voicemail  Preferred Call Back Number: Phone [phone] (home)

## 2025-02-24 ENCOUNTER — OFFICE VISIT (OUTPATIENT)
Age: 60
End: 2025-02-24
Payer: COMMERCIAL

## 2025-02-24 VITALS
BODY MASS INDEX: 26.8 KG/M2 | DIASTOLIC BLOOD PRESSURE: 78 MMHG | TEMPERATURE: 98.6 F | WEIGHT: 157 LBS | SYSTOLIC BLOOD PRESSURE: 114 MMHG | OXYGEN SATURATION: 99 % | RESPIRATION RATE: 18 BRPM | HEART RATE: 98 BPM | HEIGHT: 64 IN

## 2025-02-24 DIAGNOSIS — I10 BENIGN ESSENTIAL HTN: ICD-10-CM

## 2025-02-24 DIAGNOSIS — E78.00 HYPERCHOLESTEROLEMIA: ICD-10-CM

## 2025-02-24 DIAGNOSIS — E11.65 TYPE 2 DIABETES MELLITUS WITH HYPERGLYCEMIA, WITHOUT LONG-TERM CURRENT USE OF INSULIN (HCC): Primary | ICD-10-CM

## 2025-02-24 PROCEDURE — 99214 OFFICE O/P EST MOD 30 MIN: CPT | Performed by: INTERNAL MEDICINE

## 2025-02-24 PROCEDURE — 3074F SYST BP LT 130 MM HG: CPT | Performed by: INTERNAL MEDICINE

## 2025-02-24 PROCEDURE — 3044F HG A1C LEVEL LT 7.0%: CPT | Performed by: INTERNAL MEDICINE

## 2025-02-24 PROCEDURE — 3078F DIAST BP <80 MM HG: CPT | Performed by: INTERNAL MEDICINE

## 2025-02-24 RX ORDER — BROMPHENIRAMINE MALEATE, PSEUDOEPHEDRINE HYDROCHLORIDE, AND DEXTROMETHORPHAN HYDROBROMIDE 2; 30; 10 MG/5ML; MG/5ML; MG/5ML
SYRUP ORAL
COMMUNITY
Start: 2025-02-22

## 2025-02-24 RX ORDER — DOXYCYCLINE 100 MG/1
CAPSULE ORAL
COMMUNITY
Start: 2025-02-22

## 2025-02-24 RX ORDER — BENZONATATE 100 MG/1
CAPSULE ORAL
COMMUNITY
Start: 2025-02-22

## 2025-02-24 RX ORDER — METHYLPREDNISOLONE 4 MG/1
TABLET ORAL
COMMUNITY
Start: 2025-02-22

## 2025-02-24 NOTE — PROGRESS NOTES
[facility] DIABETES AND ENDOCRINOLOGY                 Sravanthi Cooper MD                 Referred by: Meredith Case MD            Chief Complaint   Patient presents with    Type 2 diabetes mellitus with hyperglycemia, without long-t               History of Present Illness: Jolly Almonte is here  for fu  of  Type 2 Diabetes Mellitus .       Type 2 Diabetes was diagnosed in 2016 .    History of Present Illness    He has been inconsistently adhering to his metformin regimen. He reports a 15-pound weight loss, from 172 to 157, over 20 days due to increased physical activity during a trip to [place], attributed to extensive walking and a balanced diet.    He is on a daily dose of rosuvastatin for cholesterol management but occasionally misses doses, including last night's. He estimates missing the medication 2 to 3 times per month. During a recent overseas trip, he had limited dietary control, frequently consuming hotel food and sweets.    He has been experiencing a cough and was prescribed medication by [facility]. He reports no morning congestion or cough. Both influenza and COVID-19 tests were negative.    MEDICATIONS  Current: Rosuvastatin, metformin, linagliptin, Lexapro.                       Physical Examination:      General: pleasant, no distress, good eye contact    HEENT: no exophthalmos, no periorbital edema, EOMI    Neck: No thyromegaly    CVS: S1-S2 regular    RS: Normal respiratory effort    Musculoskeletal: no tremors    Neurological: alert and oriented    Psychiatric: normal mood and affect    Skin: Normal color         Data Reviewed:                 Lab Results   Component Value Date    GFRAA 80 09/01/2021        Lab Results   Component Value Date    LABA1C 6.2 (H) 02/17/2025    LABA1C 6.2 (H) 08/12/2024    LABA1C 6.5 (H) 04/09/2024         Lab Results   Component Value Date    TRIG 100 04/18/2022    HDL 35 (L) 04/18/2022     02/17/2025    K 5.2 02/17/2025

## 2025-02-24 NOTE — PROGRESS NOTES
Identified pt with two pt identifiers(name and ). Reviewed record in preparation for visit and have obtained necessary documentation. All patient medications has been reviewed.  Chief Complaint   Patient presents with    Type 2 diabetes mellitus with hyperglycemia, without long-t       Vitals:    25 1305   BP: 114/78   Pulse: 98   Resp: 18   Temp: 98.6 °F (37 °C)   SpO2: 99%                   Coordination of Care Questionnaire:   1) Have you been to an emergency room, urgent care, or hospitalized since your last visit?   Yes       2. Have seen or consulted any other health care provider since your last visit? No        Patient is accompanied by Self I have received verbal consent from Jolly Almonte to discuss any/all medical information while they are present in the room.

## 2025-06-05 DIAGNOSIS — R79.89 ELEVATED SERUM CREATININE: ICD-10-CM

## 2025-06-05 DIAGNOSIS — I10 BENIGN ESSENTIAL HTN: ICD-10-CM

## 2025-06-06 NOTE — TELEPHONE ENCOUNTER
Medication Refill Request    Jolly Almonte is requesting a refill of the following medication(s):   Requested Prescriptions     Pending Prescriptions Disp Refills    lisinopril (PRINIVIL;ZESTRIL) 10 MG tablet [Pharmacy Med Name: LISINOPRIL 10 MG TABLET] 90 tablet 3     Sig: TAKE 1 TABLET BY MOUTH EVERY DAY        Listed PCP is Zaina Anderson, DO   Last provider to prescribe medication: Justin  Last Date of Medication Prescribed: 08/28/2024  Date of Last Office Visit at [facility]: 08/28/2024  FUTURE APPOINTMENT: No future appointments.    Please send refill to:    [facility]/pharmacy #[id number] - [place], [place] - [address] [facility] - P [phone] - F [phone]  [address] [place] [address]  Phone: [phone] Fax: [phone]      Please review request and approve or deny with recommendations.

## 2025-06-09 DIAGNOSIS — J45.40 MODERATE PERSISTENT ASTHMA, UNCOMPLICATED: ICD-10-CM

## 2025-06-09 DIAGNOSIS — J30.2 SEASONAL ALLERGIC REACTION: ICD-10-CM

## 2025-06-09 DIAGNOSIS — E11.65 TYPE 2 DIABETES MELLITUS WITH HYPERGLYCEMIA, WITHOUT LONG-TERM CURRENT USE OF INSULIN (HCC): ICD-10-CM

## 2025-06-09 NOTE — TELEPHONE ENCOUNTER
Patient requesting refills for 5 drugs:    montelukast (SINGULAIR) 10 MG tablet  fluticasone (FLONASE) 50 MCG/ACT nasal spray  metFORMIN (GLUCOPHAGE-XR) 500 MG extended releas tab  mometasone-formoterol (DULERA) 200-5 MCG/ACT inhaler  rosuvastatin (CRESTOR) 10 MG tablet    [facility]/pharmacy #[id number] - [facility] [address] [place] [address] RD - P [phone] - F [phone]

## 2025-06-10 ENCOUNTER — ANCILLARY PROCEDURE (OUTPATIENT)
Age: 60
End: 2025-06-10
Payer: COMMERCIAL

## 2025-06-10 ENCOUNTER — OFFICE VISIT (OUTPATIENT)
Age: 60
End: 2025-06-10
Payer: COMMERCIAL

## 2025-06-10 VITALS
BODY MASS INDEX: 27.14 KG/M2 | OXYGEN SATURATION: 99 % | SYSTOLIC BLOOD PRESSURE: 100 MMHG | WEIGHT: 159 LBS | TEMPERATURE: 97.9 F | DIASTOLIC BLOOD PRESSURE: 67 MMHG | RESPIRATION RATE: 18 BRPM | HEIGHT: 64 IN | HEART RATE: 80 BPM

## 2025-06-10 DIAGNOSIS — J45.41 MODERATE PERSISTENT ASTHMA WITH ACUTE EXACERBATION: Primary | ICD-10-CM

## 2025-06-10 DIAGNOSIS — J45.41 MODERATE PERSISTENT ASTHMA WITH ACUTE EXACERBATION: ICD-10-CM

## 2025-06-10 DIAGNOSIS — J30.2 SEASONAL ALLERGIC REACTION: ICD-10-CM

## 2025-06-10 PROCEDURE — 71046 X-RAY EXAM CHEST 2 VIEWS: CPT

## 2025-06-10 PROCEDURE — 99213 OFFICE O/P EST LOW 20 MIN: CPT

## 2025-06-10 RX ORDER — FLUTICASONE PROPIONATE 50 MCG
SPRAY, SUSPENSION (ML) NASAL
Qty: 3 EACH | Refills: 1 | OUTPATIENT
Start: 2025-06-10

## 2025-06-10 RX ORDER — MONTELUKAST SODIUM 10 MG/1
10 TABLET ORAL NIGHTLY
Qty: 90 TABLET | Refills: 3 | Status: SHIPPED | OUTPATIENT
Start: 2025-06-10

## 2025-06-10 RX ORDER — FLUTICASONE PROPIONATE 50 MCG
SPRAY, SUSPENSION (ML) NASAL
Qty: 3 EACH | Refills: 1 | Status: SHIPPED | OUTPATIENT
Start: 2025-06-10

## 2025-06-10 RX ORDER — BENZONATATE 100 MG/1
100 CAPSULE ORAL 3 TIMES DAILY PRN
Qty: 30 CAPSULE | Refills: 1 | Status: SHIPPED | OUTPATIENT
Start: 2025-06-10

## 2025-06-10 RX ORDER — ROSUVASTATIN CALCIUM 10 MG/1
10 TABLET, COATED ORAL NIGHTLY
Qty: 90 TABLET | Refills: 2 | OUTPATIENT
Start: 2025-06-10

## 2025-06-10 RX ORDER — MONTELUKAST SODIUM 10 MG/1
10 TABLET ORAL NIGHTLY
Qty: 90 TABLET | Refills: 3 | OUTPATIENT
Start: 2025-06-10

## 2025-06-10 RX ORDER — ALBUTEROL SULFATE 90 UG/1
2 INHALANT RESPIRATORY (INHALATION) EVERY 4 HOURS PRN
Qty: 8 EACH | Refills: 2 | Status: SHIPPED | OUTPATIENT
Start: 2025-06-10

## 2025-06-10 RX ORDER — METFORMIN HYDROCHLORIDE 500 MG/1
500 TABLET, EXTENDED RELEASE ORAL DAILY
Qty: 90 TABLET | Refills: 3 | OUTPATIENT
Start: 2025-06-10

## 2025-06-10 ASSESSMENT — PATIENT HEALTH QUESTIONNAIRE - PHQ9
SUM OF ALL RESPONSES TO PHQ QUESTIONS 1-9: 0
2. FEELING DOWN, DEPRESSED OR HOPELESS: NOT AT ALL
SUM OF ALL RESPONSES TO PHQ QUESTIONS 1-9: 0
SUM OF ALL RESPONSES TO PHQ QUESTIONS 1-9: 0
1. LITTLE INTEREST OR PLEASURE IN DOING THINGS: NOT AT ALL
SUM OF ALL RESPONSES TO PHQ QUESTIONS 1-9: 0

## 2025-06-10 NOTE — TELEPHONE ENCOUNTER
Medication Refill Request    Jolly Almonte is requesting a refill of the following medication(s):   Requested Prescriptions     Pending Prescriptions Disp Refills    montelukast (SINGULAIR) 10 MG tablet 90 tablet 3     Sig: Take 1 tablet by mouth nightly    rosuvastatin (CRESTOR) 10 MG tablet 90 tablet 2     Sig: Take 1 tablet by mouth nightly    mometasone-formoterol (DULERA) 200-5 MCG/ACT inhaler 13 each 3    metFORMIN (GLUCOPHAGE-XR) 500 MG extended release tablet 90 tablet 3     Sig: Take 1 tablet by mouth daily    fluticasone (FLONASE) 50 MCG/ACT nasal spray 3 each 1     Sig: SHAKE LIQUID AND USE 2 SPRAYS IN EACH NOSTRIL EVERY DAY        Listed PCP is Zaina Anderson, DO   Last provider to prescribe medication: marek  Last Date of Medication Prescribed: 08/28/2024  Date of Last Office Visit at [facility]: 08/28/2024  FUTURE APPOINTMENT:   Future Appointments   Date Time Provider Department Center   6/10/2025  7:10 PM Amy Fong MD [facility] DEP       Please send refill to:    [id number] - [place], [place] - [address] - P [phone] - F [phone]  [address] [facility] [address]  Phone: [phone] Fax: [phone]      Please review request and approve or deny with recommendations.

## 2025-06-11 RX ORDER — GABAPENTIN 300 MG/1
CAPSULE ORAL
COMMUNITY
Start: 2025-06-05

## 2025-06-11 RX ORDER — LISINOPRIL 10 MG/1
10 TABLET ORAL DAILY
Qty: 90 TABLET | Refills: 3 | Status: SHIPPED | OUTPATIENT
Start: 2025-06-11

## 2025-06-12 ENCOUNTER — RESULTS FOLLOW-UP (OUTPATIENT)
Age: 60
End: 2025-06-12

## 2025-06-12 DIAGNOSIS — E11.65 TYPE 2 DIABETES MELLITUS WITH HYPERGLYCEMIA, WITHOUT LONG-TERM CURRENT USE OF INSULIN (HCC): ICD-10-CM

## 2025-06-12 ASSESSMENT — ENCOUNTER SYMPTOMS
COUGH: 1
CHEST TIGHTNESS: 0
SHORTNESS OF BREATH: 0

## 2025-06-12 NOTE — PROGRESS NOTES
[facility] Residency Attending Attestation: I have seen and evaluated the patient, repeating/performing the critical or key elements of the service. I discussed the findings, assessment and plan with the resident and agree with the resident's documentation.    Yobani Catalan MD, MPH  [facility]    
Roomed by name and .    Chief Complaint   Patient presents with    Wheezing     Times 10 days.    Cough    Congestion    Medication Refill        Vitals:    06/10/25 1909   BP: 100/67   Pulse: 80   Resp: 18   Temp: 97.9 °F (36.6 °C)   TempSrc: Temporal   SpO2: 99%   Weight: 72.1 kg (159 lb)   Height: 1.626 m (5' 4\")          \"Have you been to the ER, urgent care clinic since your last visit?  Hospitalized since your last visit?\"    NO    “Have you seen or consulted any other health care providers outside of [facility] since your last visit?”    NO            Click Here for Release of Records Request     
seconds.   Neurological:      General: No focal deficit present.      Mental Status: He is alert.         No results found for this visit on 06/10/25.      Personally reviewed chest x-ray which shows normal central patent airways, clear lung zones, no costophrenic angle blunting.    Assessment / Plan        Assessment & Plan  Moderate persistent asthma with acute exacerbation    Asthma with persistent symptoms since running out of controller medication, no symptoms of lower airway infection and patient is not in respiratory distress. Refilling his Dulera for better control     Orders:    mometasone-formoterol (DULERA) 200-5 MCG/ACT inhaler; Inhale 2 puffs into the lungs 2 times daily    albuterol sulfate HFA (PROVENTIL;VENTOLIN;PROAIR) 108 (90 Base) MCG/ACT inhaler; Inhale 2 puffs into the lungs every 4 hours as needed for Wheezing    montelukast (SINGULAIR) 10 MG tablet; Take 1 tablet by mouth nightly    XR CHEST (2 VIEWS); Future    benzonatate (TESSALON) 100 MG capsule; Take 1 capsule by mouth 3 times daily as needed for Cough    Seasonal allergic reaction   Chronic, not at goal (unstable), refilling his medications.     Orders:    montelukast (SINGULAIR) 10 MG tablet; Take 1 tablet by mouth nightly    fluticasone (FLONASE) 50 MCG/ACT nasal spray; SHAKE LIQUID AND USE 2 SPRAYS IN EACH NOSTRIL EVERY DAY      No follow-ups on file.       Health Maintenance Due   Topic Date Due    Hepatitis C screen  Never done    Hepatitis B vaccine (1 of 3 - 19+ 3-dose series) Never done    DTaP/Tdap/Td vaccine (1 - Tdap) Never done    Pneumococcal 50+ years Vaccine (1 of 2 - PCV) Never done    Shingles vaccine (1 of 2) Never done    Diabetic foot exam  02/02/2020    COVID-19 Vaccine (1 - 2024-25 season) Never done    Diabetic retinal exam  10/24/2024        Please note that this dictation may have been completed with Dragon, the [employer] voice recognition software.  Quite often unanticipated grammatical, syntax, homophones, and

## 2025-06-12 NOTE — ASSESSMENT & PLAN NOTE
Asthma with persistent symptoms since running out of controller medication, no symptoms of lower airway infection and patient is not in respiratory distress. Refilling his Dulera for better control     Orders:    mometasone-formoterol (DULERA) 200-5 MCG/ACT inhaler; Inhale 2 puffs into the lungs 2 times daily    albuterol sulfate HFA (PROVENTIL;VENTOLIN;PROAIR) 108 (90 Base) MCG/ACT inhaler; Inhale 2 puffs into the lungs every 4 hours as needed for Wheezing    montelukast (SINGULAIR) 10 MG tablet; Take 1 tablet by mouth nightly    XR CHEST (2 VIEWS); Future    benzonatate (TESSALON) 100 MG capsule; Take 1 capsule by mouth 3 times daily as needed for Cough

## 2025-06-12 NOTE — TELEPHONE ENCOUNTER
Medication Refill Request    Jolly Almonte is requesting a refill of the following medication(s):   Requested Prescriptions     Pending Prescriptions Disp Refills    rosuvastatin (CRESTOR) 10 MG tablet [Pharmacy Med Name: ROSUVASTATIN CALCIUM 10 MG TAB] 90 tablet 2     Sig: TAKE 1 TABLET BY MOUTH EVERY DAY AT NIGHT        Listed PCP is Zaina Anderson, DO   Last provider to prescribe medication: Justin  Last Date of Medication Prescribed: 08/28/2024  Date of Last Office Visit at [facility]: 06/10/2025  FUTURE APPOINTMENT: No future appointments.    Please send refill to:    [id number] - [place], [place] - [address] - P [phone] - F [phone]  [address] [place] [address]  Phone: [phone] Fax: [phone]      Please review request and approve or deny with recommendations.

## 2025-06-13 RX ORDER — ROSUVASTATIN CALCIUM 10 MG/1
10 TABLET, COATED ORAL NIGHTLY
Qty: 90 TABLET | Refills: 3 | Status: SHIPPED | OUTPATIENT
Start: 2025-06-13

## 2025-08-28 LAB
HBA1C MFR BLD: 6.1 % (ref 4.8–5.6)
LDLC SERPL DIRECT ASSAY-MCNC: 105 MG/DL (ref 0–99)

## 2025-08-29 DIAGNOSIS — E11.9 TYPE 2 DIABETES MELLITUS WITHOUT COMPLICATIONS (HCC): ICD-10-CM

## 2025-08-29 RX ORDER — METFORMIN HYDROCHLORIDE 500 MG/1
500 TABLET, EXTENDED RELEASE ORAL DAILY
Qty: 90 TABLET | Refills: 3 | Status: SHIPPED | OUTPATIENT
Start: 2025-08-29

## 2025-09-02 ENCOUNTER — TELEPHONE (OUTPATIENT)
Age: 60
End: 2025-09-02

## 2025-09-02 ENCOUNTER — OFFICE VISIT (OUTPATIENT)
Age: 60
End: 2025-09-02
Payer: COMMERCIAL

## 2025-09-02 VITALS
TEMPERATURE: 98.6 F | BODY MASS INDEX: 26.84 KG/M2 | HEART RATE: 86 BPM | DIASTOLIC BLOOD PRESSURE: 64 MMHG | SYSTOLIC BLOOD PRESSURE: 95 MMHG | OXYGEN SATURATION: 100 % | HEIGHT: 64 IN | WEIGHT: 157.23 LBS

## 2025-09-02 DIAGNOSIS — E78.00 HYPERCHOLESTEROLEMIA: ICD-10-CM

## 2025-09-02 DIAGNOSIS — J30.2 SEASONAL ALLERGIC REACTION: ICD-10-CM

## 2025-09-02 DIAGNOSIS — I10 BENIGN ESSENTIAL HTN: ICD-10-CM

## 2025-09-02 DIAGNOSIS — E11.65 TYPE 2 DIABETES MELLITUS WITH HYPERGLYCEMIA, WITHOUT LONG-TERM CURRENT USE OF INSULIN (HCC): Primary | ICD-10-CM

## 2025-09-02 DIAGNOSIS — J45.41 MODERATE PERSISTENT ASTHMA WITH ACUTE EXACERBATION: ICD-10-CM

## 2025-09-02 PROBLEM — R20.2 NOTALGIA PARESTHETICA: Status: ACTIVE | Noted: 2025-08-12

## 2025-09-02 PROBLEM — L72.0 EPIDERMOID CYST OF SKIN: Status: ACTIVE | Noted: 2025-08-12

## 2025-09-02 PROBLEM — M17.11 PRIMARY OSTEOARTHRITIS OF RIGHT KNEE: Status: ACTIVE | Noted: 2025-08-12

## 2025-09-02 PROCEDURE — 3044F HG A1C LEVEL LT 7.0%: CPT | Performed by: INTERNAL MEDICINE

## 2025-09-02 PROCEDURE — 3078F DIAST BP <80 MM HG: CPT | Performed by: INTERNAL MEDICINE

## 2025-09-02 PROCEDURE — 99214 OFFICE O/P EST MOD 30 MIN: CPT | Performed by: INTERNAL MEDICINE

## 2025-09-02 PROCEDURE — 3074F SYST BP LT 130 MM HG: CPT | Performed by: INTERNAL MEDICINE
